# Patient Record
Sex: FEMALE | Race: WHITE | NOT HISPANIC OR LATINO | Employment: OTHER
[De-identification: names, ages, dates, MRNs, and addresses within clinical notes are randomized per-mention and may not be internally consistent; named-entity substitution may affect disease eponyms.]

---

## 2017-03-20 ENCOUNTER — RECORDS - HEALTHEAST (OUTPATIENT)
Dept: ADMINISTRATIVE | Facility: OTHER | Age: 67
End: 2017-03-20

## 2017-05-08 ENCOUNTER — RECORDS - HEALTHEAST (OUTPATIENT)
Dept: BONE DENSITY | Facility: CLINIC | Age: 67
End: 2017-05-08

## 2017-05-08 ENCOUNTER — RECORDS - HEALTHEAST (OUTPATIENT)
Dept: ADMINISTRATIVE | Facility: OTHER | Age: 67
End: 2017-05-08

## 2017-05-08 ENCOUNTER — HOSPITAL ENCOUNTER (OUTPATIENT)
Dept: MAMMOGRAPHY | Facility: CLINIC | Age: 67
Discharge: HOME | End: 2017-05-08
Attending: INTERNAL MEDICINE

## 2017-05-08 DIAGNOSIS — Z12.31 VISIT FOR SCREENING MAMMOGRAM: ICD-10-CM

## 2017-05-08 DIAGNOSIS — Z13.820 ENCOUNTER FOR SCREENING FOR OSTEOPOROSIS: ICD-10-CM

## 2017-05-08 DIAGNOSIS — Z78.0 ASYMPTOMATIC MENOPAUSAL STATE: ICD-10-CM

## 2019-03-11 ENCOUNTER — RECORDS - HEALTHEAST (OUTPATIENT)
Dept: ADMINISTRATIVE | Facility: OTHER | Age: 69
End: 2019-03-11

## 2019-05-16 ENCOUNTER — RECORDS - HEALTHEAST (OUTPATIENT)
Dept: ADMINISTRATIVE | Facility: OTHER | Age: 69
End: 2019-05-16

## 2019-05-16 ENCOUNTER — RECORDS - HEALTHEAST (OUTPATIENT)
Dept: BONE DENSITY | Facility: CLINIC | Age: 69
End: 2019-05-16

## 2019-05-16 ENCOUNTER — HOSPITAL ENCOUNTER (OUTPATIENT)
Dept: MAMMOGRAPHY | Facility: CLINIC | Age: 69
Discharge: HOME | End: 2019-05-16
Attending: INTERNAL MEDICINE

## 2019-05-16 DIAGNOSIS — Z12.31 VISIT FOR SCREENING MAMMOGRAM: ICD-10-CM

## 2019-05-16 DIAGNOSIS — M85.80 OTHER SPECIFIED DISORDERS OF BONE DENSITY AND STRUCTURE, UNSPECIFIED SITE: ICD-10-CM

## 2019-05-16 DIAGNOSIS — Z78.0 ASYMPTOMATIC MENOPAUSAL STATE: ICD-10-CM

## 2019-11-04 ENCOUNTER — OFFICE VISIT (OUTPATIENT)
Dept: FAMILY MEDICINE | Facility: CLINIC | Age: 69
End: 2019-11-04
Payer: MEDICARE

## 2019-11-04 VITALS
HEART RATE: 68 BPM | TEMPERATURE: 97.7 F | SYSTOLIC BLOOD PRESSURE: 115 MMHG | WEIGHT: 151 LBS | OXYGEN SATURATION: 98 % | DIASTOLIC BLOOD PRESSURE: 77 MMHG | RESPIRATION RATE: 12 BRPM

## 2019-11-04 DIAGNOSIS — Z71.84 ENCOUNTER FOR COUNSELING FOR TRAVEL: Primary | ICD-10-CM

## 2019-11-04 DIAGNOSIS — Z23 NEED FOR IMMUNIZATION AGAINST TYPHOID: ICD-10-CM

## 2019-11-04 DIAGNOSIS — Z23 NEED FOR HEPATITIS A VACCINATION: ICD-10-CM

## 2019-11-04 PROCEDURE — 90472 IMMUNIZATION ADMIN EACH ADD: CPT | Performed by: PHYSICIAN ASSISTANT

## 2019-11-04 PROCEDURE — 90632 HEPA VACCINE ADULT IM: CPT | Performed by: PHYSICIAN ASSISTANT

## 2019-11-04 PROCEDURE — 99401 PREV MED CNSL INDIV APPRX 15: CPT | Mod: 25 | Performed by: PHYSICIAN ASSISTANT

## 2019-11-04 PROCEDURE — 90471 IMMUNIZATION ADMIN: CPT | Performed by: PHYSICIAN ASSISTANT

## 2019-11-04 PROCEDURE — 90691 TYPHOID VACCINE IM: CPT | Performed by: PHYSICIAN ASSISTANT

## 2019-11-04 RX ORDER — FLUTICASONE PROPIONATE 50 MCG
1 SPRAY, SUSPENSION (ML) NASAL
COMMUNITY
Start: 2016-04-19

## 2019-11-04 RX ORDER — AZITHROMYCIN 500 MG/1
TABLET, FILM COATED ORAL
Qty: 9 TABLET | Refills: 0 | Status: SHIPPED | OUTPATIENT
Start: 2019-11-04 | End: 2022-07-28

## 2019-11-04 RX ORDER — ASPIRIN 81 MG/1
81 TABLET ORAL
COMMUNITY
End: 2022-11-18

## 2019-11-04 RX ORDER — MEFLOQUINE HYDROCHLORIDE 250 MG/1
TABLET ORAL
Qty: 14 TABLET | Refills: 0 | Status: SHIPPED | OUTPATIENT
Start: 2019-11-04 | End: 2022-07-28

## 2019-11-04 RX ORDER — AZELASTINE HYDROCHLORIDE 137 UG/1
SPRAY, METERED NASAL
Refills: 1 | COMMUNITY
Start: 2019-10-11

## 2019-11-04 RX ORDER — ACETAMINOPHEN 500 MG
1000 TABLET ORAL
COMMUNITY
Start: 2019-09-10

## 2019-11-04 RX ORDER — MOMETASONE FUROATE 1 MG/G
CREAM TOPICAL
Refills: 11 | COMMUNITY
Start: 2019-09-25

## 2019-11-04 NOTE — PATIENT INSTRUCTIONS
"See travel packet provided  Recommend ultrathon (mosquito repellant), pepto bismol and imodium  The food and drink choices you make while traveling can impact your likelihood of getting sick.   If you aren't sure if a food or drink is safe, the saying \" BOIL IT, COOK IT, PEEL IT, OR FORGET IT\" can help you decide whether it's okay to consume.   Also bring hand  and sun screen with you.  Safe Travels       Today November 4, 2019 you received the    Hepatitis A Vaccine - Please return on 5/4/20 or later for your 2nd and final dose.    Typhoid - injectable. This vaccine is valid for two years.   .    These appointments can be made as a NURSE ONLY visit.    **It is very important for the vaccinations to be given on the scheduled day(s), this helps ensure you receive the full effectiveness of the vaccine.**    Please call [facility] with any questions [phone]    Thank you for visiting [facility]    "

## 2019-11-04 NOTE — PROGRESS NOTES
SUBJECTIVE: Pily Mejia , a 69 year old  female, presents for counseling and information regarding upcoming travel to [place], [place], [place], [place], [place]. Special medical concerns include: none. She anticipates the following unusual exposures: none.    Itinerary:  [place], [place], [place], [place], [place].    Departure Date: 12/25/19 Return date: 2/14/20    Reason for travel (i.e. Business, pleasure): pleasure    Visiting an urban or rural area?: Urban    Accommodations (i.e. hotel, hostel, friends, family, etc): hotel    Women - First day of your last period: na    IMMUNIZATION HISTORY  Have you received any vaccinations in the past 4 weeks?  No  Have you ever fainted from having your blood drawn or from an injection?  No  Have you ever had a fever reaction to vaccination?  Yes  Have you ever had any bad reaction or side effect from any vaccination?  Yes  Have you ever had hepatitis A or B vaccine?  No  Do you live (or work closely) with anyone who has AIDS, an AIDS-like condition, any other immune disorder or who is on chemotherapy for cancer?  No  Have you received any injection of immune globulin or any blood products during the past 12 months?  No    GENERAL MEDICAL HISTORY  Do you have a medical condition that warrants maintenance medication or physician follow-up?  yes  Do you have a medical condition that is stable now, but that may recur while traveling?  yes  Has your spleen been removed?  No  Have you had an acute illness or a fever in the past 48 hours?  No  Are you pregnant, or might you become pregnant on this trip?  Any chance of pregnancy?  No  Are you breastfeeding?  No  Do you have HIV, AIDS, an AIDS-like condition, any other immune disorder, leukemia or cancer?  No  Do you have a severe combined immunodeficiency disease?  No  Have you had your thymus gland removed or history of problems with your thymus, such as myasthenia gravis, DiGeorge syndrome, or thymoma?  No    Do you have  severe thrombocytopenia (low platelet count) or a coagulation disorder?  No  Have you ever had a convulsion, seizure, epilepsy, neurologic condition or brain infection?  No  Do you have any stomach conditions?  No  Do you have a G6PD deficiency?  No  Do you have severe renal or kidney impairment?  No  Do you have a history of psychiatric problems?  No  Do you have a problem with strange dreams and/or nightmares?  No  Do you have insomnia?  No  Do you have problems with vaginitis?  No  Do you have psoriasis?  No  Are you prone to motion sickness?  yes  Have you ever had headaches, nausea, vomiting, or breathing problems from altitude exposure?  No      No past medical history on file.   Immunization History   Administered Date(s) Administered     Td (Adult), Adsorbed 1950, 04/01/2004       No current outpatient medications on file.     Allergies not on file     EXAM: deferred    Immunizations discussed include: Hepatitis A and Typhoid  Malaraia prophylaxis recommended: mefloquine  Symptomatic treatment for traveler's diarrhea: bismuth subsalicylate, loperamide/diphenoxylate and azithromycin    ASSESSMENT/PLAN:    (Z71.84) Encounter for counseling for travel  (primary encounter diagnosis)    Comment: Hepatitis A and typhoid vaccines today. Patient will return or follow-up with PCP in 6 months for Hep A booster. Prophylaxis given for Traveler's diarrhea and Malaria. All questions were answered.     Plan: mefloquine (LARIAM) 250 MG tablet, azithromycin        (ZITHROMAX) 500 MG tablet            (Z23) Need for hepatitis A vaccination  Comment:   Plan: HEPA VACCINE ADULT IM            (Z23) Need for immunization against typhoid  Comment:   Plan: TYPHOID VACCINE, IM              I have reviewed general recommendations for safe travel   including: food/water precautions, insect avoidance, safe sex   practices given high prevalence of HIV and other STDs,   roadway safety. Educational materials and links to the CDC    Traveler's health website have been provided.    Total time 20 minutes, greater than 50 percent in counseling   and coordination of care.

## 2020-09-03 ENCOUNTER — HOSPITAL ENCOUNTER (OUTPATIENT)
Dept: MAMMOGRAPHY | Facility: CLINIC | Age: 70
Discharge: HOME | End: 2020-09-03
Attending: INTERNAL MEDICINE

## 2020-09-03 DIAGNOSIS — Z12.31 VISIT FOR SCREENING MAMMOGRAM: ICD-10-CM

## 2021-05-29 ENCOUNTER — RECORDS - HEALTHEAST (OUTPATIENT)
Dept: ADMINISTRATIVE | Facility: CLINIC | Age: 71
End: 2021-05-29

## 2021-05-30 ENCOUNTER — RECORDS - HEALTHEAST (OUTPATIENT)
Dept: ADMINISTRATIVE | Facility: CLINIC | Age: 71
End: 2021-05-30

## 2021-07-21 ENCOUNTER — RECORDS - HEALTHEAST (OUTPATIENT)
Dept: ADMINISTRATIVE | Facility: CLINIC | Age: 71
End: 2021-07-21

## 2021-10-28 ENCOUNTER — TRANSCRIBE ORDERS (OUTPATIENT)
Dept: OTHER | Age: 71
End: 2021-10-28

## 2021-10-28 ENCOUNTER — TELEPHONE (OUTPATIENT)
Dept: DERMATOLOGY | Facility: CLINIC | Age: 71
End: 2021-10-28
Payer: MEDICARE

## 2021-10-28 ENCOUNTER — TRANSFERRED RECORDS (OUTPATIENT)
Dept: HEALTH INFORMATION MANAGEMENT | Facility: CLINIC | Age: 71
End: 2021-10-28
Payer: MEDICARE

## 2021-10-28 DIAGNOSIS — M35.00 HX OF SJOGREN'S DISEASE (H): Primary | ICD-10-CM

## 2021-10-28 DIAGNOSIS — G90.1 DYSAUTONOMIA (H): ICD-10-CM

## 2021-10-28 NOTE — TELEPHONE ENCOUNTER
Health Call Center    Phone Message    May a detailed message be left on voicemail: yes     Reason for Call: Appointment Intake    Referring Provider Name:   Dr Fernandez at [facility]    Diagnosis and/or Symptoms:   Incoming referral    Action Taken: Message routed to:  [facility] Derm    Travel Screening: Not Applicable    Pt was told by Dr Fernandez that she needs to see Dr Michael. Pt stated that she does not have a clear Dx and mentioned both scleroderma, rosacea and receeding skin.    Instructed Pt to provide referral fax to referring  as referral not yet received.    Please call Pt to determine if she can be seen by Dr Michael.

## 2022-02-10 ENCOUNTER — VIRTUAL VISIT (OUTPATIENT)
Dept: DERMATOLOGY | Facility: CLINIC | Age: 72
End: 2022-02-10
Attending: DERMATOLOGY
Payer: COMMERCIAL

## 2022-02-10 DIAGNOSIS — G62.9 NEUROPATHY: Primary | ICD-10-CM

## 2022-02-10 DIAGNOSIS — L71.9 ROSACEA: ICD-10-CM

## 2022-02-10 PROCEDURE — 99205 OFFICE O/P NEW HI 60 MIN: CPT | Mod: TEL | Performed by: DERMATOLOGY

## 2022-02-10 RX ORDER — LIDOCAINE 40 MG/G
CREAM TOPICAL
Qty: 45 G | Refills: 1 | Status: SHIPPED | OUTPATIENT
Start: 2022-02-10 | End: 2022-05-12

## 2022-02-10 RX ORDER — DOXYCYCLINE HYCLATE 20 MG
20 TABLET ORAL 2 TIMES DAILY
Qty: 180 TABLET | Refills: 2 | Status: SHIPPED | OUTPATIENT
Start: 2022-02-10 | End: 2022-11-18

## 2022-02-10 NOTE — LETTER
Date:February 11, 2022      Patient was self referred, no letter generated. Do not send.        [facility] Health Information

## 2022-02-10 NOTE — PROGRESS NOTES
Pily is a 71 year old who is being evaluated via a billable telephone visit.      What phone number would you like to be contacted at? [phone]  How would you like to obtain your AVS? [place]  Phone call duration:65 minutes    Radha Slaughter CMA

## 2022-02-10 NOTE — LETTER
2/10/2022       RE: Pily Mejia  [address] [address]     Dear Colleague,    Thank you for referring your patient, Pily Mejia, to the [facility] [place] [facility]. Please see a copy of my visit note below.    Pily is a 71 year old who is being evaluated via a billable telephone visit.      What phone number would you like to be contacted at? [phone]  How would you like to obtain your AVS? Shotot  Phone call duration:65 minutes    Radha Slaughter CMA      Rheumatology-Dermatology Clinic New Patient Note    Patient is new to the clinic, seen in consultation for Dr. Tavo Gonzalez    Dermatology Problem List  1. Sjogren syndrome (+ RUSLAN 1:320, speckeled, + Ro60 234, + SSA 4.6, bneg monoclonal on spep, normal complements, weak + Phospholuipid ab 18.4)  - RF neg, C3/c4 wnl, ds-DNA neg, Immunoglobulins nml, hep c neg  - Tried plaquenil but complained of nausea, hair loss, dizziness  - Salivary glands scan wnl  2. Burning dysesthesia on face  3. Anhidrosis   -  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 SHe thinks that is since childhood.  - Other autonomic reflex screen was minimally abnormal. Isolated finding of focal reduced sweat response to the proximal leg with normal cardiovagal and adrenergic reflexes, which was of uncertain significance.   - Brain MRI wnl.  4. Rosacea   5. Osteopenia  6. S/p Hysterectomy with bilateral salpingo-oophorectomy for endometriosis, age 36.  7. 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 Hx of vitamin D deficiency   9. Diminished thyroid uptake on lerft compared to rt on scan    CC:  Chief Complaint   Patient presents with     Consult     initial visit. referred by Dr. Fernandez potential rosacea or scleroderma     History of Present Illness  Pily Mejia is a 71 year old  female with the above noted PMH being seen in rheum-derm clinic for evaluation of  facial dysesthesia  - Has had worsening fatigue which is now one of the most problematic issues. Her joint pains are worse in the hands (swelling), left hip and knees. Hip is most painful when she is laying flat on her back and her legs/knee are straight, She has been carrying her grandson. Knees worse at night after walking with baby. A little stiff in morning going up and down steps. Left hip pain has not been evaluated.  No pain with walking or standing gets better with walking.  She had sweat test and she does not sweat much. She feels hot flashes at night. A little sweating under her arms at night. She wakes up feeling hot and damp in axilla, behind knees, under breasts. No drenching night sweats. + dry eyes and dry mouth.   She was using restasis for about 5 years and was switched to Xiidra due to cost, but found the restasis more helpful. .he uses occasional over-the-counter Systane. No hx of parotid swelling. She has hx of a lot of cavities. She has never had any parotid gland swelling. She is using a collagen supplement that she takes daily whichshe thinks helps. She thinks it helps her skin feel less tight and more moisture.    No hx of raynauds. Fluids go down ok. She is having issues swallowing. She makes sure she drinks enough water otherwise things will get stuck, not only in back of throat but also down esophagus. SHe has to be mindful about sheing. Bread is very hard. Better with vegetables.  Feels like she is getting weaker.She stopped for a while exercising due to covid,.     Pushing and scratching hurts her skin. Nothing makes it better or worse.    She does think the elidel cream helps.    Burning started march and august at the same time as the shot.    She had issues with azelastine and fluoride tooth past. It feels like there is burning and irritation in the throat and problems swallowing after use. 30 min fluiride rinse doesn't do it.     Complete Review of Systems  Constitutional: no fevers,  chills, night sweats or unintentional weight change, chronically fatigued. Night sweats  which are primarily under her breasts, armpits, and the back of her knee  Eyes: no vision change, diplopia or red eyes   Ears, Nose, Mouth, Throat: no tinnitus or hearing change, no epistaxis or nasal discharge, no oral lesions, throat clear   Cardiovascular: no chest pain, palpitations, or pain with walking, no orthopnea or PND   Respiratory: no dyspnea, cough, shortness of breath or wheezing   GI: 1-2 bowle movemenst a day. Takes probiootics, Feels she has to work a little bit to go.  :No issues with urination. Vaginal dryness  That is helped with collagen.  Musculoskeletal: no joint or muscle pain or swelling   Integumentary: no concerning lesions or moles   Neuro: no dysesthesias other than the face.    Past Medical History:    Thyroid nodule or a cyst adjacent to the thyroid being followed.  Pulmonary nodules.  BPPV.  Seasonal/environmental allergies, receives allergy shots.  GERD.  Diverticulitis x2, last in 2016.  Vitamin D deficiency  Cataracts.  Fracture of her left upper arm skiing when she was in her 40s.  Osteopenia.  G3, P2, SA1. No complications with her 2 pregnancies. Last delivery in 1979    Medications:  Current Outpatient Medications   Medication     acetaminophen (TYLENOL) 500 MG tablet     aspirin 81 MG EC tablet     Azelastine HCl 137 MCG/SPRAY SOLN     azithromycin (ZITHROMAX) 500 MG tablet     Calcium Carbonate Antacid (TUMS CHEWY DELIGHTS) 1177 MG CHEW     diclofenac (VOLTAREN) 1 % topical gel     fluticasone (FLONASE) 50 MCG/ACT nasal spray     mefloquine (LARIAM) 250 MG tablet     mometasone (ELOCON) 0.1 % external cream     probiotic CAPS     No current facility-administered medications for this visit.     Allergies:   Allergies   Allergen Reactions     Codeine      Fish GI Disturbance     Hydroxychloroquine Other (See Comments) and GI Disturbance     Lac Bovis GI Disturbance     No Clinical  Screening - See Comments Other (See Comments)     Triminycine per patient     Penicillins      Soybean Oil GI Disturbance     Social History  Social History     Socioeconomic History     Marital status:      Spouse name: Not on file     Number of children: Not on file     Years of education: Not on file     Highest education level: Not on file   Occupational History     Not on file   Tobacco Use     Smoking status: Never Smoker     Smokeless tobacco: Never Used   Substance and Sexual Activity     Alcohol use: Not on file     Drug use: Not on file     Sexual activity: Not on file   Other Topics Concern     Not on file   Social History Narrative     Not on file     Social Determinants of Health     Financial Resource Strain: Not on file   Food Insecurity: Not on file   Transportation Needs: Not on file   Physical Activity: Not on file   Stress: Not on file   Social Connections: Not on file   Intimate Partner Violence: Not on file   Housing Stability: Not on file     Family History:  Family History   Problem Relation Age of Onset     Breast Cancer No family hx of      Physical Exam  GEN: NAD, alert and appropriately responsive  SKIN:   - Small indentation on forehead  - Loss of lateral third eyebrow    65 min spent on phone getting history and counseling    Examination of the head, neck, chest, back, abdomen, upper and lower extremities and buttock were done     Labs/Imaging: Reviewed  TSH 1/13/22 wnl    Assessment & Plan:  1) Facial dysesthesia  - Suspect small fiber peripheral neuropathy. ? 2/2 sjogren syndrome  - Less likely neurogenic rosacea or other atypical facial pain  - Discussed biopsies but I dont think this would  at this time  - Lidocaine cream as needed of burning   - Will discuss other causes other than sjogren syndrome with neurology. I donty see b12 or lyme evaluated. Recent SPEP wnl,. I do understand that sjogren is most likely given the clinical picture.  - She wants to avoid  oral meds for now    2) Sjogren sydnrome  - Joint pains do not sound inflammatory and she is appropriately managing her ocular and vaginal dryness. Would like to switch back to restasis.  She will discuss this with ophthalmology. No high risk findings for lymphoma.     3) Rosacea  - Continue elidel BID and reduce doxycyline down to 20mG BID    Thank you for referring Pily Mejia to the rheumatology-dermatology clinic        Again, thank you for allowing me to participate in the care of your patient.      Sincerely,    Aureliano Michael MD

## 2022-02-10 NOTE — PATIENT INSTRUCTIONS
- Lidocaine cream as needed of burning   - Will discuss other causes other than sjogren syndrome with neurology  - Decrease doxycyline to 20mg twice a day

## 2022-02-10 NOTE — PROGRESS NOTES
Rheumatology-Dermatology Clinic New Patient Note    Patient is new to the clinic, seen in consultation for Dr. Tavo Gonzalez    Dermatology Problem List  1. Sjogren syndrome (+ RUSLAN 1:320, speckeled, + Ro60 234, + SSA 4.6, bneg monoclonal on spep, normal complements, weak + Phospholuipid ab 18.4)  - RF neg, C3/c4 wnl, ds-DNA neg, Immunoglobulins nml, hep c neg  - Tried plaquenil but complained of nausea, hair loss, dizziness  - Salivary glands scan wnl  2. Burning dysesthesia on face  3. Anhidrosis   -  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 SHe thinks that is since childhood.  - Other autonomic reflex screen was minimally abnormal. Isolated finding of focal reduced sweat response to the proximal leg with normal cardiovagal and adrenergic reflexes, which was of uncertain significance.   - Brain MRI wnl.  4. Rosacea   5. Osteopenia  6. S/p Hysterectomy with bilateral salpingo-oophorectomy for endometriosis, age 36.  7. 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 Hx of vitamin D deficiency   9. Diminished thyroid uptake on lerft compared to rt on scan    CC:  Chief Complaint   Patient presents with     Consult     initial visit. referred by Dr. Fernandez potential rosacea or scleroderma     History of Present Illness  Pily Mejia is a 71 year old  female with the above noted PMH being seen in rheum-derm clinic for evaluation of facial dysesthesia  - Has had worsening fatigue which is now one of the most problematic issues. Her joint pains are worse in the hands (swelling), left hip and knees. Hip is most painful when she is laying flat on her back and her legs/knee are straight, She has been carrying her grandson. Knees worse at night after walking with baby. A little stiff in morning going up and down steps. Left hip pain has not been evaluated.  No pain with walking or standing gets better with walking.  She had sweat test and she does not sweat much. She feels hot flashes at night. A  little sweating under her arms at night. She wakes up feeling hot and damp in axilla, behind knees, under breasts. No drenching night sweats. + dry eyes and dry mouth.   She was using restasis for about 5 years and was switched to Xiidra due to cost, but found the restasis more helpful. .he uses occasional over-the-counter Systane. No hx of parotid swelling. She has hx of a lot of cavities. She has never had any parotid gland swelling. She is using a collagen supplement that she takes daily whichshe thinks helps. She thinks it helps her skin feel less tight and more moisture.    No hx of raynauds. Fluids go down ok. She is having issues swallowing. She makes sure she drinks enough water otherwise things will get stuck, not only in back of throat but also down esophagus. SHe has to be mindful about sheing. Bread is very hard. Better with vegetables.  Feels like she is getting weaker.She stopped for a while exercising due to covid,.     Pushing and scratching hurts her skin. Nothing makes it better or worse.    She does think the elidel cream helps.    Burning started march and august at the same time as the shot.    She had issues with azelastine and fluoride tooth past. It feels like there is burning and irritation in the throat and problems swallowing after use. 30 min fluiride rinse doesn't do it.     Complete Review of Systems  Constitutional: no fevers, chills, night sweats or unintentional weight change, chronically fatigued. Night sweats  which are primarily under her breasts, armpits, and the back of her knee  Eyes: no vision change, diplopia or red eyes   Ears, Nose, Mouth, Throat: no tinnitus or hearing change, no epistaxis or nasal discharge, no oral lesions, throat clear   Cardiovascular: no chest pain, palpitations, or pain with walking, no orthopnea or PND   Respiratory: no dyspnea, cough, shortness of breath or wheezing   GI: 1-2 bowle movemenst a day. Takes probiootics, Feels she has to work a little  bit to go.  :No issues with urination. Vaginal dryness  That is helped with collagen.  Musculoskeletal: no joint or muscle pain or swelling   Integumentary: no concerning lesions or moles   Neuro: no dysesthesias other than the face.    Past Medical History:    Thyroid nodule or a cyst adjacent to the thyroid being followed.  Pulmonary nodules.  BPPV.  Seasonal/environmental allergies, receives allergy shots.  GERD.  Diverticulitis x2, last in 2016.  Vitamin D deficiency  Cataracts.  Fracture of her left upper arm skiing when she was in her 40s.  Osteopenia.  G3, P2, SA1. No complications with her 2 pregnancies. Last delivery in 1979    Medications:  Current Outpatient Medications   Medication     acetaminophen (TYLENOL) 500 MG tablet     aspirin 81 MG EC tablet     Azelastine HCl 137 MCG/SPRAY SOLN     azithromycin (ZITHROMAX) 500 MG tablet     Calcium Carbonate Antacid (TUMS CHEWY DELIGHTS) 1177 MG CHEW     diclofenac (VOLTAREN) 1 % topical gel     fluticasone (FLONASE) 50 MCG/ACT nasal spray     mefloquine (LARIAM) 250 MG tablet     mometasone (ELOCON) 0.1 % external cream     probiotic CAPS     No current facility-administered medications for this visit.     Allergies:   Allergies   Allergen Reactions     Codeine      Fish GI Disturbance     Hydroxychloroquine Other (See Comments) and GI Disturbance     Lac Bovis GI Disturbance     No Clinical Screening - See Comments Other (See Comments)     Triminycine per patient     Penicillins      Soybean Oil GI Disturbance     Social History  Social History     Socioeconomic History     Marital status:      Spouse name: Not on file     Number of children: Not on file     Years of education: Not on file     Highest education level: Not on file   Occupational History     Not on file   Tobacco Use     Smoking status: Never Smoker     Smokeless tobacco: Never Used   Substance and Sexual Activity     Alcohol use: Not on file     Drug use: Not on file     Sexual  activity: Not on file   Other Topics Concern     Not on file   Social History Narrative     Not on file     Social Determinants of Health     Financial Resource Strain: Not on file   Food Insecurity: Not on file   Transportation Needs: Not on file   Physical Activity: Not on file   Stress: Not on file   Social Connections: Not on file   Intimate Partner Violence: Not on file   Housing Stability: Not on file     Family History:  Family History   Problem Relation Age of Onset     Breast Cancer No family hx of      Physical Exam  GEN: NAD, alert and appropriately responsive  SKIN:   - Small indentation on forehead  - Loss of lateral third eyebrow    65 min spent on phone getting history and counseling    Examination of the head, neck, chest, back, abdomen, upper and lower extremities and buttock were done     Labs/Imaging: Reviewed  TSH 1/13/22 wnl    Assessment & Plan:  1) Facial dysesthesia  - Suspect small fiber peripheral neuropathy. ? 2/2 sjogren syndrome  - Less likely neurogenic rosacea or other atypical facial pain  - Discussed biopsies but I dont think this would  at this time  - Lidocaine cream as needed of burning   - Will discuss other causes other than sjogren syndrome with neurology. I donty see b12 or lyme evaluated. Recent SPEP wnl,. I do understand that sjogren is most likely given the clinical picture.  - She wants to avoid oral meds for now    2) Sjogren sydnrome  - Joint pains do not sound inflammatory and she is appropriately managing her ocular and vaginal dryness. Would like to switch back to restasis.  She will discuss this with ophthalmology. No high risk findings for lymphoma.     3) Rosacea  - Continue elidel BID and reduce doxycyline down to 20mG BID    Thank you for referring Pily Mejia to the rheumatology-dermatology clinic

## 2022-02-13 ENCOUNTER — HEALTH MAINTENANCE LETTER (OUTPATIENT)
Age: 72
End: 2022-02-13

## 2022-03-17 ENCOUNTER — TELEPHONE (OUTPATIENT)
Dept: DERMATOLOGY | Facility: CLINIC | Age: 72
End: 2022-03-17
Payer: COMMERCIAL

## 2022-03-17 NOTE — TELEPHONE ENCOUNTER
Called to follow-up. Facial dysesthesia has been much better. Had one epsidode yesterday which resolved quickly. Never tried the lidocaine. Talked to her [facility] neurologist who recommended against biopsies and recommended pulsed steroids if getting worse. Will follow-up With Pily in a few months.

## 2022-05-05 ENCOUNTER — LAB (OUTPATIENT)
Dept: LAB | Facility: CLINIC | Age: 72
End: 2022-05-05
Payer: COMMERCIAL

## 2022-05-05 ENCOUNTER — OFFICE VISIT (OUTPATIENT)
Dept: DERMATOLOGY | Facility: CLINIC | Age: 72
End: 2022-05-05
Attending: DERMATOLOGY
Payer: COMMERCIAL

## 2022-05-05 VITALS
HEART RATE: 81 BPM | OXYGEN SATURATION: 97 % | SYSTOLIC BLOOD PRESSURE: 121 MMHG | WEIGHT: 154 LBS | DIASTOLIC BLOOD PRESSURE: 74 MMHG

## 2022-05-05 DIAGNOSIS — R94.31 ABNORMAL EKG: Primary | ICD-10-CM

## 2022-05-05 DIAGNOSIS — G62.9 NEUROPATHY: ICD-10-CM

## 2022-05-05 LAB — VIT B12 SERPL-MCNC: 332 PG/ML (ref 193–986)

## 2022-05-05 PROCEDURE — 82607 VITAMIN B-12: CPT | Performed by: PATHOLOGY

## 2022-05-05 PROCEDURE — 36415 COLL VENOUS BLD VENIPUNCTURE: CPT | Performed by: PATHOLOGY

## 2022-05-05 PROCEDURE — 99214 OFFICE O/P EST MOD 30 MIN: CPT | Performed by: DERMATOLOGY

## 2022-05-05 PROCEDURE — 86618 LYME DISEASE ANTIBODY: CPT | Performed by: DERMATOLOGY

## 2022-05-05 PROCEDURE — G0463 HOSPITAL OUTPT CLINIC VISIT: HCPCS

## 2022-05-05 RX ORDER — AZATHIOPRINE 50 MG/1
TABLET ORAL
COMMUNITY
Start: 2022-03-18 | End: 2022-07-28

## 2022-05-05 ASSESSMENT — PAIN SCALES - GENERAL: PAINLEVEL: NO PAIN (0)

## 2022-05-05 NOTE — PROGRESS NOTES
Provider Disclosure:   I agree with above History, Review of Systems, Physical exam and Plan. I have reviewed the content of the documentation and have edited it as needed. I have personally performed the services documented here and the documentation accurately represents those services and the decisions I have made.   [facility] Dermatology Note  Encounter Date: May 5, 2022  Office Visit     Dermatology Problem List:  1.Sjogren syndrome (+ RUSLAN 1:320, speckeled, + Ro60 234, + SSA 4.6, bneg monoclonal on spep, normal complements, weak + Phospholuipid ab 18.4)  - RF neg, C3/c4 wnl, ds-DNA neg, Immunoglobulins nml, hep c neg  -Started azathioprine 25mg.  - Tried plaquenil but complained of nausea, hair loss, dizziness  - Salivary glands scan wnl  2. Burning dysesthesia on face  3. Anhidrosis   -  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 SHe thinks that is since childhood.  - Other autonomic reflex screen was minimally abnormal. Isolated finding of focal reduced sweat response to the proximal leg with normal cardiovagal and adrenergic reflexes, which was of uncertain significance.   - Brain MRI wnl.  4. Rosacea   5. Osteopenia  6. S/p Hysterectomy with bilateral salpingo-oophorectomy for endometriosis, age 36.  7. 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 Hx of vitamin D deficiency   9. Diminished thyroid uptake on lerft compared to rt on scan    ____________________________________________    Assessment & Plan:     # ***.  {kkplans:605551}   - ***     # ***.   {kkplans:350498}   - ***     Procedures Performed:   {kkprocedurenotes:236986}  {kkprocedurenotes:312294}    Follow-up: {kkfollowup:385063}    {kkstaffinvolved:654933}    ***  ____________________________________________    CC: RECHECK (Follow up scleroderma, rosacea and neuropathy. )    HPI:  Ms. Pily S Mejia is a(n) 72 year old female who presents today {kknew/return:101844} for ***    Patient is otherwise  feeling well, without additional skin concerns.    Labs Reviewed:  ***N/A    Physical Exam:  Vitals: /74   Pulse 81   Wt 69.9 kg (154 lb)   SpO2 97%   SKIN: {kkSkinExam:153704}  - ***  - {Skin Exam Derm:644454}.   - {Skin Exam Derm:555422}.   - {Skin Exam Derm:771370}.   - No other lesions of concern on areas examined.     Medications:  Current Outpatient Medications   Medication     acetaminophen (TYLENOL) 500 MG tablet     aspirin 81 MG EC tablet     azaTHIOprine (IMURAN) 50 MG tablet     Azelastine HCl 137 MCG/SPRAY SOLN     Calcium Carbonate Antacid 1177 MG CHEW     diclofenac (VOLTAREN) 1 % topical gel     doxycycline hyclate (PERIOSTAT) 20 MG tablet     fluticasone (FLONASE) 50 MCG/ACT nasal spray     lidocaine (LMX4) 4 % external cream     mometasone (ELOCON) 0.1 % external cream     probiotic CAPS     azithromycin (ZITHROMAX) 500 MG tablet     mefloquine (LARIAM) 250 MG tablet     No current facility-administered medications for this visit.      Past Medical History:   Patient Active Problem List   Diagnosis     Osteopenia     Postsurgical Acquired Absence Of Genitalia     No past medical history on file.    CC Referred Self, MD  No address on file on close of this encounter.

## 2022-05-05 NOTE — LETTER
Date:May 12, 2022      Patient was self referred, no letter generated. Do not send.        [facility] Health Information

## 2022-05-05 NOTE — PROGRESS NOTES
[facility] Dermatology Note  Encounter Date: May 5, 2022  Office Visit     Dermatology Problem List:  1.Sjogren syndrome (+ RUSLAN 1:320, speckeled, + Ro60 234, + SSA 4.6, bneg monoclonal on spep, normal complements, weak + Phospholuipid ab 18.4)  - RF neg, C3/c4 wnl, ds-DNA neg, Immunoglobulins nml, hep c neg  -Started azathioprine 25mg.  - Tried plaquenil but complained of nausea, hair loss, dizziness  - Salivary glands scan wnl  2. Burning dysesthesia on face  3. Anhidrosis   -  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 SHe thinks that is since childhood.  - Other autonomic reflex screen was minimally abnormal. Isolated finding of focal reduced sweat response to the proximal leg with normal cardiovagal and adrenergic reflexes, which was of uncertain significance.   - Brain MRI wnl.  4. Rosacea   5. Osteopenia  6. S/p Hysterectomy with bilateral salpingo-oophorectomy for endometriosis, age 36.  7. 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 Hx of vitamin D deficiency   9. Diminished thyroid uptake on lerft compared to rt on scan    ____________________________________________    Assessment & Plan:   # Sjogren's syndrome, facial dysesthesia and neuropathy:  Plan:  - Facial dysestheisa semed to be getting better but then flared after covid booster  - [place] thinks this is consistent with neuropathy and did not think biopsies would . They recommend prednisone burst if symptoms get worse.  - With hx of first-degree heart block will recheck EKG and get echo. Also some neuropathies are associated with hypertrophic cardiomyopathies 2/2 amyloid.  -EKG was wnl  -  Echocardiogram ordered and pending  - Lyme and B12 tests today   - Continue tylenol and use lidocaine (refilled) as needed (has not tried the lidocaine yet,m but will try)      # Linear Morphea on forehead (clinically):  4 cm today:  - Recently started azathioprine. If worsening would switch to  methotrexate.   - She had side effects due to hydroxchloroquine  -  If getting worse before follow-up, will let me know ASAP    Follow-up: 12 week(s) , or earlier for new or changing lesions    Aureliano Michael MD, FAAD, FACP     Departments of Internal Medicine and Dermatology  [facility]  [phone]        ____________________________________________    CC: RECHECK (Follow up scleroderma, rosacea and neuropathy. )    HPI:  Ms. Pily Mejia is a(n) 72 year old female who presents today for follow-up  for neuropathy, facial dysesthesia and scleroderma.   She says she has a tingling,burning sensation around her cheeks and perioral area.There are no lasions or redness, but she has the sensation underneath.She remembers this started last year in March.She thinks it may be related to her first Covid vaccine dose in January 2021.She had a few flares last year in August and December, last one being last Monday after her Covid booster shot.  She feels stiff indented skin on  her forehead.She feels it has grown in size and extended upwards.  -She feels hot flashes at night. A little sweating under her arms at night. She wakes up feeling hot and damp in axilla, behind knees, under breasts. No drenching night sweats. + dry eyes and dry mouth  - For mild rosacea, the elidel cream helps.  -Mild fatigue and small joint pain.    She was using restasis for about 5 years and was switched to Xiidra due to cost, but found the restasis more helpful. .he uses occasional over-the-counter Systane. No hx of parotid swelling. She has hx of a lot of cavities. She has never had any parotid gland swelling. She is using a collagen supplement that she takes daily whichshe thinks helps. She thinks it helps her skin feel less tight and more moisture.     No hx of raynauds. Fluids go down ok. She is having issues swallowing. She makes sure she drinks enough water otherwise things will get stuck, not only in back  of throat but also down esophagus. SHe has to be mindful about sheing. Bread is very hard. Better with vegetables.  Feels like she is getting weaker.She stopped for a while exercising due to covid.      Labs Reviewed:  EKG: Normal.    Physical Exam:  Vitals: /74   Pulse 81   Wt 69.9 kg (154 lb)   SpO2 97%  EKG:NORMAL.  SKIN: Focused examination of face was performed.  -A 4cm indented plaque on her forehead going up to her mid forehead.The skin around was slightly sclerotic.  -Facial nerve examination was normal,   No visible rosacea or lesions on face.    - No other lesions of concern on areas examined.     Medications:  Current Outpatient Medications   Medication     acetaminophen (TYLENOL) 500 MG tablet     aspirin 81 MG EC tablet     azaTHIOprine (IMURAN) 50 MG tablet     Azelastine HCl 137 MCG/SPRAY SOLN     Calcium Carbonate Antacid 1177 MG CHEW     diclofenac (VOLTAREN) 1 % topical gel     doxycycline hyclate (PERIOSTAT) 20 MG tablet     fluticasone (FLONASE) 50 MCG/ACT nasal spray     lidocaine (LMX4) 4 % external cream     mometasone (ELOCON) 0.1 % external cream     probiotic CAPS     azithromycin (ZITHROMAX) 500 MG tablet     mefloquine (LARIAM) 250 MG tablet     No current facility-administered medications for this visit.      Past Medical History:   Patient Active Problem List   Diagnosis     Osteopenia     Postsurgical Acquired Absence Of Genitalia     No past medical history on file.    CC Referred Self, MD  No address on file on close of this encounter.

## 2022-05-05 NOTE — LETTER
5/5/2022       RE: Pily Mejia  [address] [place] [address]     Dear Colleague,    Thank you for referring your patient, Pily Mejia, to the [facility] RHEUMATOLOGY [facility]INIC [place] at [facility]. Please see a copy of my visit note below.    [facility] Dermatology Note  Encounter Date: May 5, 2022  Office Visit     Dermatology Problem List:  1.Sjogren syndrome (+ RUSLAN 1:320, speckeled, + Ro60 234, + SSA 4.6, bneg monoclonal on spep, normal complements, weak + Phospholuipid ab 18.4)  - RF neg, C3/c4 wnl, ds-DNA neg, Immunoglobulins nml, hep c neg  -Started azathioprine 25mg.  - Tried plaquenil but complained of nausea, hair loss, dizziness  - Salivary glands scan wnl  2. Burning dysesthesia on face  3. Anhidrosis   -  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 SHe thinks that is since childhood.  - Other autonomic reflex screen was minimally abnormal. Isolated finding of focal reduced sweat response to the proximal leg with normal cardiovagal and adrenergic reflexes, which was of uncertain significance.   - Brain MRI wnl.  4. Rosacea   5. Osteopenia  6. S/p Hysterectomy with bilateral salpingo-oophorectomy for endometriosis, age 36.  7. 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 Hx of vitamin D deficiency   9. Diminished thyroid uptake on lerft compared to rt on scan    ____________________________________________    Assessment & Plan:   # Sjogren's syndrome, facial dysesthesia and neuropathy:  Plan:  - Facial dysestheisa semed to be getting better but then flared after covid booster  - [place] thinks this is consistent with neuropathy and did not think biopsies would . They recommend prednisone burst if symptoms get worse.  - With hx of first-degree heart block will recheck EKG and get echo. Also some neuropathies are associated with hypertrophic cardiomyopathies 2/2 amyloid.  -EKG  was wnl  -  Echocardiogram ordered and pending  - Lyme and B12 tests today   - Continue tylenol and use lidocaine (refilled) as needed (has not tried the lidocaine yet,m but will try)      # Linear Morphea on forehead (clinically):  4 cm today:  - Recently started azathioprine. If worsening would switch to methotrexate.   - She had side effects due to hydroxchloroquine  -  If getting worse before follow-up, will let me know ASAP    Follow-up: 12 week(s) , or earlier for new or changing lesions    Aureliano Michael MD, FAAD, FACP     Departments of Internal Medicine and Dermatology  [facility]  [phone]        ____________________________________________    CC: RECHECK (Follow up scleroderma, rosacea and neuropathy. )    HPI:  Ms. Pily Mejia is a(n) 72 year old female who presents today for follow-up  for neuropathy, facial dysesthesia and scleroderma.   She says she has a tingling,burning sensation around her cheeks and perioral area.There are no lasions or redness, but she has the sensation underneath.She remembers this started last year in March.She thinks it may be related to her first Covid vaccine dose in January 2021.She had a few flares last year in August and December, last one being last Monday after her Covid booster shot.  She feels stiff indented skin on  her forehead.She feels it has grown in size and extended upwards.  -She feels hot flashes at night. A little sweating under her arms at night. She wakes up feeling hot and damp in axilla, behind knees, under breasts. No drenching night sweats. + dry eyes and dry mouth  - For mild rosacea, the elidel cream helps.  -Mild fatigue and small joint pain.    She was using restasis for about 5 years and was switched to Xiidra due to cost, but found the restasis more helpful. .he uses occasional over-the-counter Systane. No hx of parotid swelling. She has hx of a lot of cavities. She has never had any parotid gland swelling.  She is using a collagen supplement that she takes daily whichshe thinks helps. She thinks it helps her skin feel less tight and more moisture.     No hx of raynauds. Fluids go down ok. She is having issues swallowing. She makes sure she drinks enough water otherwise things will get stuck, not only in back of throat but also down esophagus. SHe has to be mindful about sheing. Bread is very hard. Better with vegetables.  Feels like she is getting weaker.She stopped for a while exercising due to covid.      Labs Reviewed:  EKG: Normal.    Physical Exam:  Vitals: /74   Pulse 81   Wt 69.9 kg (154 lb)   SpO2 97%  EKG:NORMAL.  SKIN: Focused examination of face was performed.  -A 4cm indented plaque on her forehead going up to her mid forehead.The skin around was slightly sclerotic.  -Facial nerve examination was normal,   No visible rosacea or lesions on face.    - No other lesions of concern on areas examined.     Medications:  Current Outpatient Medications   Medication     acetaminophen (TYLENOL) 500 MG tablet     aspirin 81 MG EC tablet     azaTHIOprine (IMURAN) 50 MG tablet     Azelastine HCl 137 MCG/SPRAY SOLN     Calcium Carbonate Antacid 1177 MG CHEW     diclofenac (VOLTAREN) 1 % topical gel     doxycycline hyclate (PERIOSTAT) 20 MG tablet     fluticasone (FLONASE) 50 MCG/ACT nasal spray     lidocaine (LMX4) 4 % external cream     mometasone (ELOCON) 0.1 % external cream     probiotic CAPS     azithromycin (ZITHROMAX) 500 MG tablet     mefloquine (LARIAM) 250 MG tablet     No current facility-administered medications for this visit.      Past Medical History:   Patient Active Problem List   Diagnosis     Osteopenia     Postsurgical Acquired Absence Of Genitalia     No past medical history on file.    CC Referred Self, MD  No address on file on close of this encounter.      Again, thank you for allowing me to participate in the care of your patient.      Sincerely,    Aureliano Michael MD

## 2022-05-05 NOTE — NURSING NOTE
Chief Complaint   Patient presents with     RECHECK     Follow up scleroderma, rosacea and neuropathy.      Blood pressure 121/74, pulse 81, weight 69.9 kg (154 lb), SpO2 97 %.    Radha Slaughter CMA

## 2022-05-05 NOTE — PATIENT INSTRUCTIONS
Clinical diagnosis for forehead area: Morphea    Plan today   - EKG and Echocardiogram ordered   - Lyme and B12 tests today   - Continue tylenol and use lidocaine as needed  -  Monitor area on forehead. If getting worse let me know ASAP

## 2022-05-06 LAB — B BURGDOR IGG+IGM SER QL: 0.1

## 2022-05-09 ENCOUNTER — MYC MEDICAL ADVICE (OUTPATIENT)
Dept: DERMATOLOGY | Facility: CLINIC | Age: 72
End: 2022-05-09
Payer: COMMERCIAL

## 2022-05-12 RX ORDER — LIDOCAINE 40 MG/G
CREAM TOPICAL
Qty: 45 G | Refills: 1 | Status: SHIPPED | OUTPATIENT
Start: 2022-05-12

## 2022-05-26 ENCOUNTER — TELEPHONE (OUTPATIENT)
Dept: DERMATOLOGY | Facility: CLINIC | Age: 72
End: 2022-05-26
Payer: COMMERCIAL

## 2022-05-26 DIAGNOSIS — G62.9 NEUROPATHY: Primary | ICD-10-CM

## 2022-05-26 RX ORDER — GABAPENTIN 100 MG/1
CAPSULE ORAL
Qty: 90 CAPSULE | Refills: 3 | Status: SHIPPED | OUTPATIENT
Start: 2022-05-26 | End: 2023-05-22

## 2022-05-26 NOTE — TELEPHONE ENCOUNTER
Sometimes feels like something is in her eyes  Whole face feels very tight and burning, low grade has been getting better. Tight burning sensation mostly in chin forehead and cheeks  She does not feel like she gets red with it  No worsening with sun exposure   Redness in face does not change.  She notes neuropathy is already getting better.   Feels better overall with hand swelling and joints on azathioprine. Will continue this for now. She is not sure iof morphea is getting worse. Will start gabapenetin 100mg at night and increase slowly to 300mg at night ,. If she feels morphea is getting worse, wll switch to methotrexate.

## 2022-07-11 ENCOUNTER — HOSPITAL ENCOUNTER (OUTPATIENT)
Dept: CARDIOLOGY | Facility: CLINIC | Age: 72
Discharge: HOME | End: 2022-07-11
Attending: DERMATOLOGY | Admitting: DERMATOLOGY
Payer: COMMERCIAL

## 2022-07-11 DIAGNOSIS — R94.31 ABNORMAL EKG: ICD-10-CM

## 2022-07-11 LAB — LVEF ECHO: NORMAL

## 2022-07-11 PROCEDURE — 93306 TTE W/DOPPLER COMPLETE: CPT | Mod: 26 | Performed by: INTERNAL MEDICINE

## 2022-07-11 PROCEDURE — 93306 TTE W/DOPPLER COMPLETE: CPT

## 2022-07-28 ENCOUNTER — OFFICE VISIT (OUTPATIENT)
Dept: DERMATOLOGY | Facility: CLINIC | Age: 72
End: 2022-07-28
Attending: DERMATOLOGY
Payer: COMMERCIAL

## 2022-07-28 VITALS
OXYGEN SATURATION: 97 % | TEMPERATURE: 97.5 F | SYSTOLIC BLOOD PRESSURE: 113 MMHG | RESPIRATION RATE: 18 BRPM | DIASTOLIC BLOOD PRESSURE: 72 MMHG | WEIGHT: 154 LBS | HEART RATE: 65 BPM

## 2022-07-28 DIAGNOSIS — L94.0 MORPHEA: ICD-10-CM

## 2022-07-28 DIAGNOSIS — Z79.899 ENCOUNTER FOR LONG-TERM (CURRENT) USE OF HIGH-RISK MEDICATION: Primary | ICD-10-CM

## 2022-07-28 PROCEDURE — G0463 HOSPITAL OUTPT CLINIC VISIT: HCPCS

## 2022-07-28 PROCEDURE — 99214 OFFICE O/P EST MOD 30 MIN: CPT | Performed by: DERMATOLOGY

## 2022-07-28 RX ORDER — METHOTREXATE 2.5 MG/1
TABLET ORAL
Qty: 24 TABLET | Refills: 4 | Status: SHIPPED | OUTPATIENT
Start: 2022-07-28 | End: 2022-11-18

## 2022-07-28 ASSESSMENT — PAIN SCALES - GENERAL: PAINLEVEL: NO PAIN (0)

## 2022-07-28 NOTE — PROGRESS NOTES
[facility] Dermatology Note  Encounter Date: May 5, 2022  Office Visit      Dermatology Problem List:  1.Sjogren syndrome (+ RUSLAN 1:320, speckeled, + Ro60 234, + SSA 4.6, bneg monoclonal on spep, normal complements, weak + Phospholuipid ab 18.4)  - RF neg, C3/c4 wnl, ds-DNA neg, Immunoglobulins nml, hep c ne  - Tried plaquenil but complained of nausea, hair loss, dizziness  - Salivary glands scan wnl  2. Burning dysesthesia on face  3. Anhidrosis   -  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 SHe thinks that is since childhood.  - Other autonomic reflex screen was minimally abnormal. Isolated finding of focal reduced sweat response to the proximal leg with normal cardiovagal and adrenergic reflexes, which was of uncertain significance.   - Brain MRI wnl.  4. Rosacea   5. Osteopenia  6. S/p Hysterectomy with bilateral salpingo-oophorectomy for endometriosis, age 36.  7. 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 Hx of vitamin D deficiency   9. Diminished thyroid uptake on lerft compared to rt on scan      CC: RECHECK (Follow up scleroderma, rosacea and neuropathy. )     HPI:  Ms. Pily Mejia is a(n) 72 year old female who presents today for follow-up  for neuropathy, facial dysesthesia and scleroderma.     Facial dysesthesia resolved currently on the gabapentin 100mg at night. No side effect  Previously had hot flashes at night. Now resolved.   . + dry eyes and dry mouth  - Uses pimecrolimus for mild rosacea  -Mild fatigue and small joint pain.  -  No much stuiffness in am. Hands feel a little tight and achy. Difficulty opening wine bottle seems weaker.    She gets pumps on her chest and legs, < arms when exposed to sun. Soemtimes they itch     She uses Neutrogena 30spgf.    She was using restasis for about 5 years and was switched to Xiidra due to cost, but found the restasis more helpful. .he uses occasional over-the-counter Systane. No hx of parotid swelling.  She has hx of a lot of cavities. She has never had any parotid gland swelling. She is using a collagen supplement that she takes daily whichshe thinks helps. She thinks it helps her skin feel less tight and more moisture.     No hx of raynauds. Fluids go down ok. She is having issues swallowing. She makes sure she drinks enough water otherwise things will get stuck, not only in back of throat but also down esophagus. SHe has to be mindful about sheing. Bread is very hard. Better with vegetables.  Feels like she is getting weaker.She stopped for a while exercising due to covid.     Physical Exam:  /72   Pulse 65   Temp 97.5  F (36.4  C)   Resp 18   Wt 69.9 kg (154 lb)   SpO2 97% .  SKIN: Focused examination of face was performed.  -A 4.2cm indented plaque on her forehead going up to her mid forehead down to rt medial eyebrown area extending a little into the orbital rim.The skin around was slightly sclerotic.  -Facial nerve examination was normal,   No visible rosacea or lesions on face.  =- Scattered 1-2mm papules few on lower gs  - No other lesions of concern on areas examined.      Medications:  Current Outpatient Medications   Medication     acetaminophen (TYLENOL) 500 MG tablet     aspirin 81 MG EC tablet     Azelastine HCl 137 MCG/SPRAY SOLN     Calcium Carbonate Antacid 1177 MG CHEW     diclofenac (VOLTAREN) 1 % topical gel     doxycycline hyclate (PERIOSTAT) 20 MG tablet     fluticasone (FLONASE) 50 MCG/ACT nasal spray     gabapentin (NEURONTIN) 100 MG capsule     lidocaine (LMX4) 4 % external cream     mometasone (ELOCON) 0.1 % external cream     probiotic CAPS     azaTHIOprine (IMURAN) 50 MG tablet     azithromycin (ZITHROMAX) 500 MG tablet     mefloquine (LARIAM) 250 MG tablet     No current facility-administered medications for this visit.     Past Medical History:       Patient Active Problem List   Diagnosis     Osteopenia     Postsurgical Acquired Absence Of Genitalia      Past Medical  History   No past medical history on file.       Assessment & Plan:   # Sjogren's syndrome, facial dysesthesia and neuropathy:  Plan:  - Facial dysestheisa semed to be getting better but then flared after covid booster  - [place] thinks this is consistent with neuropathy and did not think biopsies would . They recommend prednisone burst if symptoms get worse.  - Currently resolved with gabapentin 100mg daily  - Continue tylenol and use lidocaine (refilled) as needed (has not tried the lidocaine yet,m but will try)     # Linear Morphea on forehead (clinical diagnosis):  4.2 cm today. Possibly a little extension into orbital rim. Clinically still hard to see  - Recently to methotrexate   - She had side effects due to hydroxychloroquine  - Only has been taking 5mg methotrexate. She will increase to 15mg weekly and folic acid daily. She will increae her self 1 pill weekly as tolerated. CBC and CMP evrery 2 weeks until at 15mg. This paln s as per pt request (she is on doxycycline and asa which can increase the methotrexate level.+)   -  If getting worse before follow-up, will let me know ASA  - She has had shingles and pneumonia vaccines. Boosted x1 for covid, Planning 2nd.     # Rosacea  - Elidel cream BID   - Doxycyline 20mg BID    # PMLE?  - Sunscreen and elidel as needed    Aureliano Michael MD, FAAD, FACP     Departments of Internal Medicine and Dermatology  [facility]  [phone]  Follow-up: 8 week(s) phone

## 2022-07-28 NOTE — PATIENT INSTRUCTIONS
Continue gabapebntin 100mg nightly   Methotrexate increase to 6 pills (15mg weekly) and folic acid daily  OK to increase folic acid to 2-3mg daily not feeling great  Labs every 2 weeks for 12 weeksPhone visit in 8 weeks

## 2022-07-28 NOTE — NURSING NOTE
Chief Complaint   Patient presents with     RECHECK     Follow Up - 12 weeks     /72   Pulse 65   Temp 97.5  F (36.4  C)   Resp 18   Wt 69.9 kg (154 lb)   SpO2 97%   Hitesh Wood on 7/28/2022 at 9:28 AM

## 2022-08-11 ENCOUNTER — LAB (OUTPATIENT)
Dept: LAB | Facility: CLINIC | Age: 72
End: 2022-08-11
Payer: COMMERCIAL

## 2022-08-11 DIAGNOSIS — Z79.899 ENCOUNTER FOR LONG-TERM (CURRENT) USE OF HIGH-RISK MEDICATION: ICD-10-CM

## 2022-08-11 DIAGNOSIS — L94.0 MORPHEA: ICD-10-CM

## 2022-08-11 LAB
BASOPHILS # BLD AUTO: 0 10E3/UL (ref 0–0.2)
BASOPHILS NFR BLD AUTO: 0 %
EOSINOPHIL # BLD AUTO: 0.1 10E3/UL (ref 0–0.7)
EOSINOPHIL NFR BLD AUTO: 3 %
ERYTHROCYTE [DISTWIDTH] IN BLOOD BY AUTOMATED COUNT: 14.2 % (ref 10–15)
HCT VFR BLD AUTO: 43 % (ref 35–47)
HGB BLD-MCNC: 14.3 G/DL (ref 11.7–15.7)
LYMPHOCYTES # BLD AUTO: 1.2 10E3/UL (ref 0.8–5.3)
LYMPHOCYTES NFR BLD AUTO: 34 %
MCH RBC QN AUTO: 30.6 PG (ref 26.5–33)
MCHC RBC AUTO-ENTMCNC: 33.3 G/DL (ref 31.5–36.5)
MCV RBC AUTO: 92 FL (ref 78–100)
MONOCYTES # BLD AUTO: 0.2 10E3/UL (ref 0–1.3)
MONOCYTES NFR BLD AUTO: 5 %
NEUTROPHILS # BLD AUTO: 2.2 10E3/UL (ref 1.6–8.3)
NEUTROPHILS NFR BLD AUTO: 58 %
PLATELET # BLD AUTO: 169 10E3/UL (ref 150–450)
RBC # BLD AUTO: 4.68 10E6/UL (ref 3.8–5.2)
WBC # BLD AUTO: 3.7 10E3/UL (ref 4–11)

## 2022-08-11 PROCEDURE — 36415 COLL VENOUS BLD VENIPUNCTURE: CPT

## 2022-08-11 PROCEDURE — 85025 COMPLETE CBC W/AUTO DIFF WBC: CPT

## 2022-08-11 PROCEDURE — 80053 COMPREHEN METABOLIC PANEL: CPT

## 2022-08-12 LAB
ALBUMIN SERPL-MCNC: 3.5 G/DL (ref 3.4–5)
ALP SERPL-CCNC: 125 U/L (ref 40–150)
ALT SERPL W P-5'-P-CCNC: 39 U/L (ref 0–50)
ANION GAP SERPL CALCULATED.3IONS-SCNC: 6 MMOL/L (ref 3–14)
AST SERPL W P-5'-P-CCNC: 30 U/L (ref 0–45)
BILIRUB SERPL-MCNC: 0.3 MG/DL (ref 0.2–1.3)
BUN SERPL-MCNC: 15 MG/DL (ref 7–30)
CALCIUM SERPL-MCNC: 8.9 MG/DL (ref 8.5–10.1)
CHLORIDE BLD-SCNC: 111 MMOL/L (ref 94–109)
CO2 SERPL-SCNC: 25 MMOL/L (ref 20–32)
CREAT SERPL-MCNC: 0.78 MG/DL (ref 0.52–1.04)
GFR SERPL CREATININE-BSD FRML MDRD: 80 ML/MIN/1.73M2
GLUCOSE BLD-MCNC: 94 MG/DL (ref 70–99)
POTASSIUM BLD-SCNC: 4.3 MMOL/L (ref 3.4–5.3)
PROT SERPL-MCNC: 6.9 G/DL (ref 6.8–8.8)
SODIUM SERPL-SCNC: 142 MMOL/L (ref 133–144)

## 2022-08-25 ENCOUNTER — LAB (OUTPATIENT)
Dept: LAB | Facility: CLINIC | Age: 72
End: 2022-08-25
Payer: COMMERCIAL

## 2022-08-25 DIAGNOSIS — Z79.899 ENCOUNTER FOR LONG-TERM (CURRENT) USE OF HIGH-RISK MEDICATION: ICD-10-CM

## 2022-08-25 DIAGNOSIS — L94.0 MORPHEA: ICD-10-CM

## 2022-08-25 LAB
ALBUMIN SERPL-MCNC: 3.6 G/DL (ref 3.4–5)
ALP SERPL-CCNC: 131 U/L (ref 40–150)
ALT SERPL W P-5'-P-CCNC: 35 U/L (ref 0–50)
ANION GAP SERPL CALCULATED.3IONS-SCNC: 6 MMOL/L (ref 3–14)
AST SERPL W P-5'-P-CCNC: 21 U/L (ref 0–45)
BASOPHILS # BLD AUTO: 0 10E3/UL (ref 0–0.2)
BASOPHILS NFR BLD AUTO: 0 %
BILIRUB SERPL-MCNC: 0.4 MG/DL (ref 0.2–1.3)
BUN SERPL-MCNC: 18 MG/DL (ref 7–30)
CALCIUM SERPL-MCNC: 9.1 MG/DL (ref 8.5–10.1)
CHLORIDE BLD-SCNC: 108 MMOL/L (ref 94–109)
CO2 SERPL-SCNC: 27 MMOL/L (ref 20–32)
CREAT SERPL-MCNC: 0.83 MG/DL (ref 0.52–1.04)
EOSINOPHIL # BLD AUTO: 0.1 10E3/UL (ref 0–0.7)
EOSINOPHIL NFR BLD AUTO: 3 %
ERYTHROCYTE [DISTWIDTH] IN BLOOD BY AUTOMATED COUNT: 14.5 % (ref 10–15)
GFR SERPL CREATININE-BSD FRML MDRD: 74 ML/MIN/1.73M2
GLUCOSE BLD-MCNC: 101 MG/DL (ref 70–99)
HCT VFR BLD AUTO: 43.2 % (ref 35–47)
HGB BLD-MCNC: 14.5 G/DL (ref 11.7–15.7)
LYMPHOCYTES # BLD AUTO: 1.2 10E3/UL (ref 0.8–5.3)
LYMPHOCYTES NFR BLD AUTO: 34 %
MCH RBC QN AUTO: 31.5 PG (ref 26.5–33)
MCHC RBC AUTO-ENTMCNC: 33.6 G/DL (ref 31.5–36.5)
MCV RBC AUTO: 94 FL (ref 78–100)
MONOCYTES # BLD AUTO: 0.2 10E3/UL (ref 0–1.3)
MONOCYTES NFR BLD AUTO: 6 %
NEUTROPHILS # BLD AUTO: 2 10E3/UL (ref 1.6–8.3)
NEUTROPHILS NFR BLD AUTO: 57 %
PLATELET # BLD AUTO: 176 10E3/UL (ref 150–450)
POTASSIUM BLD-SCNC: 3.6 MMOL/L (ref 3.4–5.3)
PROT SERPL-MCNC: 7 G/DL (ref 6.8–8.8)
RBC # BLD AUTO: 4.61 10E6/UL (ref 3.8–5.2)
SODIUM SERPL-SCNC: 141 MMOL/L (ref 133–144)
WBC # BLD AUTO: 3.5 10E3/UL (ref 4–11)

## 2022-08-25 PROCEDURE — 85025 COMPLETE CBC W/AUTO DIFF WBC: CPT

## 2022-08-25 PROCEDURE — 36415 COLL VENOUS BLD VENIPUNCTURE: CPT

## 2022-08-25 PROCEDURE — 80053 COMPREHEN METABOLIC PANEL: CPT

## 2022-09-07 ENCOUNTER — LAB (OUTPATIENT)
Dept: LAB | Facility: CLINIC | Age: 72
End: 2022-09-07
Payer: COMMERCIAL

## 2022-09-07 DIAGNOSIS — L94.0 MORPHEA: ICD-10-CM

## 2022-09-07 DIAGNOSIS — Z79.899 ENCOUNTER FOR LONG-TERM (CURRENT) USE OF HIGH-RISK MEDICATION: ICD-10-CM

## 2022-09-07 LAB
BASOPHILS # BLD AUTO: 0 10E3/UL (ref 0–0.2)
BASOPHILS NFR BLD AUTO: 0 %
EOSINOPHIL # BLD AUTO: 0.1 10E3/UL (ref 0–0.7)
EOSINOPHIL NFR BLD AUTO: 3 %
ERYTHROCYTE [DISTWIDTH] IN BLOOD BY AUTOMATED COUNT: 14.4 % (ref 10–15)
HCT VFR BLD AUTO: 42.1 % (ref 35–47)
HGB BLD-MCNC: 13.9 G/DL (ref 11.7–15.7)
LYMPHOCYTES # BLD AUTO: 1.3 10E3/UL (ref 0.8–5.3)
LYMPHOCYTES NFR BLD AUTO: 27 %
MCH RBC QN AUTO: 31 PG (ref 26.5–33)
MCHC RBC AUTO-ENTMCNC: 33 G/DL (ref 31.5–36.5)
MCV RBC AUTO: 94 FL (ref 78–100)
MONOCYTES # BLD AUTO: 0.4 10E3/UL (ref 0–1.3)
MONOCYTES NFR BLD AUTO: 8 %
NEUTROPHILS # BLD AUTO: 3.1 10E3/UL (ref 1.6–8.3)
NEUTROPHILS NFR BLD AUTO: 62 %
PLATELET # BLD AUTO: 210 10E3/UL (ref 150–450)
RBC # BLD AUTO: 4.48 10E6/UL (ref 3.8–5.2)
WBC # BLD AUTO: 5 10E3/UL (ref 4–11)

## 2022-09-07 PROCEDURE — 36415 COLL VENOUS BLD VENIPUNCTURE: CPT

## 2022-09-07 PROCEDURE — 80053 COMPREHEN METABOLIC PANEL: CPT

## 2022-09-07 PROCEDURE — 85025 COMPLETE CBC W/AUTO DIFF WBC: CPT

## 2022-09-08 LAB
ALBUMIN SERPL-MCNC: 3.6 G/DL (ref 3.4–5)
ALP SERPL-CCNC: 124 U/L (ref 40–150)
ALT SERPL W P-5'-P-CCNC: 72 U/L (ref 0–50)
ANION GAP SERPL CALCULATED.3IONS-SCNC: 3 MMOL/L (ref 3–14)
AST SERPL W P-5'-P-CCNC: 52 U/L (ref 0–45)
BILIRUB SERPL-MCNC: 0.3 MG/DL (ref 0.2–1.3)
BUN SERPL-MCNC: 18 MG/DL (ref 7–30)
CALCIUM SERPL-MCNC: 8.6 MG/DL (ref 8.5–10.1)
CHLORIDE BLD-SCNC: 106 MMOL/L (ref 94–109)
CO2 SERPL-SCNC: 29 MMOL/L (ref 20–32)
CREAT SERPL-MCNC: 0.84 MG/DL (ref 0.52–1.04)
GFR SERPL CREATININE-BSD FRML MDRD: 73 ML/MIN/1.73M2
GLUCOSE BLD-MCNC: 102 MG/DL (ref 70–99)
POTASSIUM BLD-SCNC: 4.2 MMOL/L (ref 3.4–5.3)
PROT SERPL-MCNC: 7 G/DL (ref 6.8–8.8)
SODIUM SERPL-SCNC: 138 MMOL/L (ref 133–144)

## 2022-09-22 ENCOUNTER — VIRTUAL VISIT (OUTPATIENT)
Dept: DERMATOLOGY | Facility: CLINIC | Age: 72
End: 2022-09-22
Attending: DERMATOLOGY
Payer: COMMERCIAL

## 2022-09-22 DIAGNOSIS — R20.8 DYSESTHESIA: Primary | ICD-10-CM

## 2022-09-22 DIAGNOSIS — L94.0 MORPHEA: ICD-10-CM

## 2022-09-22 PROCEDURE — 99214 OFFICE O/P EST MOD 30 MIN: CPT | Mod: 95 | Performed by: DERMATOLOGY

## 2022-09-22 NOTE — PROGRESS NOTES
[facility] Dermatology Note  Encounter Date: Sep 22, 2022  Office Visit     Dermatology Problem List:  1.Sjogren syndrome (+ RUSLAN 1:320, speckeled, + Ro60 234, + SSA 4.6, bneg monoclonal on spep, normal complements, weak + Phospholuipid ab 18.4)  - RF neg, C3/c4 wnl, ds-DNA neg, Immunoglobulins nml, hep c ne  - Tried plaquenil but complained of nausea, hair loss, dizziness  - Salivary glands scan wnl  2. Burning dysesthesia on face  3. Anhidrosis   -  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 SHe thinks that is since childhood.  - Other autonomic reflex screen was minimally abnormal. Isolated finding of focal reduced sweat response to the proximal leg with normal cardiovagal and adrenergic reflexes, which was of uncertain significance.   - Brain MRI wnl.  4. Rosacea   5. Osteopenia  6. S/p Hysterectomy with bilateral salpingo-oophorectomy for endometriosis, age 36.  7. 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 Hx of vitamin D deficiency   9. Diminished thyroid uptake on lerft compared to rt on scan  ____________________________________________    Assessment & Plan:   #. 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  Looks stable in pictures.  Plan:  Continue 15 mg methotrexate weekly and folic acid daily.  -Labs pending ( did them  at 9 am today from [place]); will check them to decide further change in her methotrexate dose if needed or if we need to change her medication.    - She has had shingles and pneumonia vaccines. Boosted x1 for covid, Planning 2nd.    # Sjogren's syndrome, facial dysesthesia and neuropathy  -Currently improved.    Plan:  Continue gabapentin 100mg daily.      Follow-up: 8 week(s) in-person, or earlier for new or changing lesions    Staff:     Aureliano Michael MD, FAAD, FACP     Departments of Internal  Medicine and Dermatology  [facility]  [phone]    ____________________________________________    CC: f/u dysesthia and morphea    HPI:  Ms. Pily Mejia is a(n) 72 year old female who presents today for follow-up  for neuropathy, facial dysesthesia and scleroderma.   She feels her facial dysesthesia has resolved currently on the gabapentin 100mg at night. No side effects.  She complained of thickening of her forehead skin last time ; no associted itch or pain.She feels it hasn't extended but has gotten slightly deeper?She sent pictures.    Her rosacea is better ; uses Pimecrolimus and takes doxycycline;tolerating it well.  Previously had hot flashes at night. Now resolved.   . + dry eyes and dry mouth  -   -Mild fatigue and  joint pain; especially hip joint; feels they were better before on azathioprine.    She's currently on 15 mg methotrexate since five weeks, tolerating it well, no GI side effects; but her labs were slightly raised (high ALT ) on her last lab (9/7/2022); was advised to repeat them.      Patient is otherwise feeling well, without additional skin concerns.    Labs Reviewed:  CMP(9/7/2022): ALT: 72,AST: 52    Physical Exam:  SKIN: On pictures:  Indented plaque on her forehead going up to her mid forehead down to rt medial eyebrown area and nasal bridge.Looks stable.    - No other lesions of concern on areas examined.     Medications:  Current Outpatient Medications   Medication     acetaminophen (TYLENOL) 500 MG tablet     aspirin 81 MG EC tablet     doxycycline hyclate (PERIOSTAT) 20 MG tablet     gabapentin (NEURONTIN) 100 MG capsule     lidocaine (LMX4) 4 % external cream     methotrexate sodium 2.5 MG TABS     Azelastine HCl 137 MCG/SPRAY SOLN     Calcium Carbonate Antacid 1177 MG CHEW     diclofenac (VOLTAREN) 1 % topical gel     fluticasone (FLONASE) 50 MCG/ACT nasal spray     mometasone (ELOCON) 0.1 % external cream     probiotic CAPS     No current facility-administered  medications for this visit.      Past Medical History:   Patient Active Problem List   Diagnosis     Osteopenia     Postsurgical Acquired Absence Of Genitalia     No past medical history on file.    CC Referred Self, MD  No address on file on close of this encounter.

## 2022-09-22 NOTE — LETTER
Date:September 23, 2022      Patient was self referred, no letter generated. Do not send.        [facility] Health Information

## 2022-09-22 NOTE — PROGRESS NOTES
Pily is a 72 year old who is being evaluated via a billable telephone visit.      What phone number would you like to be contacted at? [phone]  How would you like to obtain your AVS? [place]

## 2022-09-22 NOTE — LETTER
9/22/2022       RE: Pily Mejia  [address] [address]     Dear Colleague,    Thank you for referring your patient, Pily Mejia, to the [facility] RHEUMATOLOGY [facility]IN[facility] [place] at [facility]. Please see a copy of my visit note below.    Pily is a 72 year old who is being evaluated via a billable telephone visit.      What phone number would you like to be contacted at? [phone]  How would you like to obtain your AVS? [place]      [facility] Dermatology Note  Encounter Date: Sep 22, 2022  Office Visit     Dermatology Problem List:  1.Sjogren syndrome (+ RUSLAN 1:320, speckeled, + Ro60 234, + SSA 4.6, bneg monoclonal on spep, normal complements, weak + Phospholuipid ab 18.4)  - RF neg, C3/c4 wnl, ds-DNA neg, Immunoglobulins nml, hep c ne  - Tried plaquenil but complained of nausea, hair loss, dizziness  - Salivary glands scan wnl  2. Burning dysesthesia on face  3. Anhidrosis   -  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 SHe thinks that is since childhood.  - Other autonomic reflex screen was minimally abnormal. Isolated finding of focal reduced sweat response to the proximal leg with normal cardiovagal and adrenergic reflexes, which was of uncertain significance.   - Brain MRI wnl.  4. Rosacea   5. Osteopenia  6. S/p Hysterectomy with bilateral salpingo-oophorectomy for endometriosis, age 36.  7. 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 Hx of vitamin D deficiency   9. Diminished thyroid uptake on lerft compared to rt on scan  ____________________________________________    Assessment & Plan:   #. 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  Looks stable in pictures.  Plan:  Continue 15 mg methotrexate weekly and folic acid daily.  -Labs pending ( did them  at 9 am  today from [place]); will check them to decide further change in her methotrexate dose if needed or if we need to change her medication.    - She has had shingles and pneumonia vaccines. Boosted x1 for covid, Planning 2nd.    # Sjogren's syndrome, facial dysesthesia and neuropathy  -Currently improved.    Plan:  Continue gabapentin 100mg daily.      Follow-up: 8 week(s) in-person, or earlier for new or changing lesions    Staff:     Aureliano Michael MD, FAAD, FACP     Departments of Internal Medicine and Dermatology  [facility]  [phone]    ____________________________________________    CC: f/u dysesthia and morphea    HPI:  Ms. Pily Mejia is a(n) 72 year old female who presents today for follow-up  for neuropathy, facial dysesthesia and scleroderma.   She feels her facial dysesthesia has resolved currently on the gabapentin 100mg at night. No side effects.  She complained of thickening of her forehead skin last time ; no associted itch or pain.She feels it hasn't extended but has gotten slightly deeper?She sent pictures.    Her rosacea is better ; uses Pimecrolimus and takes doxycycline;tolerating it well.  Previously had hot flashes at night. Now resolved.   . + dry eyes and dry mouth  -   -Mild fatigue and  joint pain; especially hip joint; feels they were better before on azathioprine.    She's currently on 15 mg methotrexate since five weeks, tolerating it well, no GI side effects; but her labs were slightly raised (high ALT ) on her last lab (9/7/2022); was advised to repeat them.      Patient is otherwise feeling well, without additional skin concerns.    Labs Reviewed:  CMP(9/7/2022): ALT: 72,AST: 52    Physical Exam:  SKIN: On pictures:  Indented plaque on her forehead going up to her mid forehead down to rt medial eyebrown area and nasal bridge.Looks stable.    - No other lesions of concern on areas examined.     Medications:  Current Outpatient Medications   Medication      acetaminophen (TYLENOL) 500 MG tablet     aspirin 81 MG EC tablet     doxycycline hyclate (PERIOSTAT) 20 MG tablet     gabapentin (NEURONTIN) 100 MG capsule     lidocaine (LMX4) 4 % external cream     methotrexate sodium 2.5 MG TABS     Azelastine HCl 137 MCG/SPRAY SOLN     Calcium Carbonate Antacid 1177 MG CHEW     diclofenac (VOLTAREN) 1 % topical gel     fluticasone (FLONASE) 50 MCG/ACT nasal spray     mometasone (ELOCON) 0.1 % external cream     probiotic CAPS     No current facility-administered medications for this visit.      Past Medical History:   Patient Active Problem List   Diagnosis     Osteopenia     Postsurgical Acquired Absence Of Genitalia     No past medical history on file.    CC Referred Self, MD  No address on file on close of this encounter.      Again, thank you for allowing me to participate in the care of your patient.      Sincerely,    Aureliano Michael MD

## 2022-10-07 ENCOUNTER — MYC MEDICAL ADVICE (OUTPATIENT)
Dept: DERMATOLOGY | Facility: CLINIC | Age: 72
End: 2022-10-07

## 2022-10-07 NOTE — TELEPHONE ENCOUNTER
FUTURE VISIT INFORMATION      FUTURE VISIT INFORMATION:    Date: 11.18.22    Time: 9:00    Location: [facility]  REFERRAL INFORMATION:    Referring provider:  Alec    Referring providers clinic:  Derm    Reason for visit/diagnosis  scleroderma, rosacea and receeding skin/ former Dr. Michael    RECORDS REQUESTED FROM:       Clinic name Comments Records Status Imaging Status   Derm 9.22.22, 7.28.22, 5.5.22 + more with  [facility] Epic Epic

## 2022-10-15 ENCOUNTER — HEALTH MAINTENANCE LETTER (OUTPATIENT)
Age: 72
End: 2022-10-15

## 2022-11-18 ENCOUNTER — OFFICE VISIT (OUTPATIENT)
Dept: DERMATOLOGY | Facility: CLINIC | Age: 72
End: 2022-11-18
Payer: COMMERCIAL

## 2022-11-18 ENCOUNTER — PRE VISIT (OUTPATIENT)
Dept: DERMATOLOGY | Facility: CLINIC | Age: 72
End: 2022-11-18

## 2022-11-18 ENCOUNTER — LAB (OUTPATIENT)
Dept: LAB | Facility: CLINIC | Age: 72
End: 2022-11-18
Payer: COMMERCIAL

## 2022-11-18 DIAGNOSIS — L94.1 LINEAR MORPHEA: Primary | ICD-10-CM

## 2022-11-18 DIAGNOSIS — Z79.899 ENCOUNTER FOR LONG-TERM (CURRENT) USE OF HIGH-RISK MEDICATION: ICD-10-CM

## 2022-11-18 DIAGNOSIS — L71.9 ROSACEA: ICD-10-CM

## 2022-11-18 DIAGNOSIS — L94.0 MORPHEA: ICD-10-CM

## 2022-11-18 DIAGNOSIS — L94.1 LINEAR MORPHEA: ICD-10-CM

## 2022-11-18 LAB
ALBUMIN SERPL BCG-MCNC: 4.4 G/DL (ref 3.5–5.2)
ALP SERPL-CCNC: 134 U/L (ref 35–104)
ALT SERPL W P-5'-P-CCNC: 44 U/L (ref 10–35)
ANION GAP SERPL CALCULATED.3IONS-SCNC: 10 MMOL/L (ref 7–15)
AST SERPL W P-5'-P-CCNC: 32 U/L (ref 10–35)
BASOPHILS # BLD AUTO: 0 10E3/UL (ref 0–0.2)
BASOPHILS NFR BLD AUTO: 1 %
BILIRUB SERPL-MCNC: 0.4 MG/DL
BUN SERPL-MCNC: 12.6 MG/DL (ref 8–23)
CALCIUM SERPL-MCNC: 9.7 MG/DL (ref 8.8–10.2)
CHLORIDE SERPL-SCNC: 103 MMOL/L (ref 98–107)
CREAT SERPL-MCNC: 0.88 MG/DL (ref 0.51–0.95)
DEPRECATED HCO3 PLAS-SCNC: 29 MMOL/L (ref 22–29)
EOSINOPHIL # BLD AUTO: 0.1 10E3/UL (ref 0–0.7)
EOSINOPHIL NFR BLD AUTO: 2 %
ERYTHROCYTE [DISTWIDTH] IN BLOOD BY AUTOMATED COUNT: 13.9 % (ref 10–15)
GFR SERPL CREATININE-BSD FRML MDRD: 69 ML/MIN/1.73M2
GLUCOSE SERPL-MCNC: 102 MG/DL (ref 70–99)
HCT VFR BLD AUTO: 42.1 % (ref 35–47)
HGB BLD-MCNC: 14.1 G/DL (ref 11.7–15.7)
IMM GRANULOCYTES # BLD: 0 10E3/UL
IMM GRANULOCYTES NFR BLD: 0 %
LYMPHOCYTES # BLD AUTO: 1.4 10E3/UL (ref 0.8–5.3)
LYMPHOCYTES NFR BLD AUTO: 33 %
MCH RBC QN AUTO: 31.4 PG (ref 26.5–33)
MCHC RBC AUTO-ENTMCNC: 33.5 G/DL (ref 31.5–36.5)
MCV RBC AUTO: 94 FL (ref 78–100)
MONOCYTES # BLD AUTO: 0.3 10E3/UL (ref 0–1.3)
MONOCYTES NFR BLD AUTO: 7 %
NEUTROPHILS # BLD AUTO: 2.4 10E3/UL (ref 1.6–8.3)
NEUTROPHILS NFR BLD AUTO: 57 %
NRBC # BLD AUTO: 0 10E3/UL
NRBC BLD AUTO-RTO: 0 /100
PLATELET # BLD AUTO: 239 10E3/UL (ref 150–450)
POTASSIUM SERPL-SCNC: 4.5 MMOL/L (ref 3.4–5.3)
PROT SERPL-MCNC: 7.3 G/DL (ref 6.4–8.3)
RBC # BLD AUTO: 4.49 10E6/UL (ref 3.8–5.2)
SODIUM SERPL-SCNC: 142 MMOL/L (ref 136–145)
WBC # BLD AUTO: 4.2 10E3/UL (ref 4–11)

## 2022-11-18 PROCEDURE — 36415 COLL VENOUS BLD VENIPUNCTURE: CPT | Performed by: PATHOLOGY

## 2022-11-18 PROCEDURE — 99214 OFFICE O/P EST MOD 30 MIN: CPT | Performed by: DERMATOLOGY

## 2022-11-18 PROCEDURE — 85025 COMPLETE CBC W/AUTO DIFF WBC: CPT | Performed by: PATHOLOGY

## 2022-11-18 PROCEDURE — 80053 COMPREHEN METABOLIC PANEL: CPT | Performed by: PATHOLOGY

## 2022-11-18 RX ORDER — METHOTREXATE 2.5 MG/1
TABLET ORAL
Qty: 32 TABLET | Refills: 4 | Status: SHIPPED | OUTPATIENT
Start: 2022-11-18 | End: 2023-02-23

## 2022-11-18 RX ORDER — PIMECROLIMUS 10 MG/G
CREAM TOPICAL 2 TIMES DAILY
Qty: 60 G | Refills: 4 | Status: SHIPPED | OUTPATIENT
Start: 2022-11-18 | End: 2023-02-23

## 2022-11-18 RX ORDER — FOLIC ACID 1 MG/1
1 TABLET ORAL DAILY
Qty: 180 TABLET | Refills: 1 | Status: SHIPPED | OUTPATIENT
Start: 2022-11-18 | End: 2023-02-23

## 2022-11-18 ASSESSMENT — PAIN SCALES - GENERAL: PAINLEVEL: NO PAIN (0)

## 2022-11-18 NOTE — LETTER
11/18/2022       RE: Pily Mejia  [address] [address]     Dear Colleague,    Thank you for referring your patient, Pily Mejia, to the [facility] DERMATOLOGY CLINIC [place] at [facility]. Please see a copy of my visit note below.    [facility] Dermatology Note  Encounter Date: Nov 18, 2022  Office Visit     Dermatology Problem List:  1. 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 Sjogren syndrome (+RUSLAN 1:320, speckled, +Ro60 234, +SSA 4.6, weak +APLA 18.4)  - RF-, C3/C4 normal, dsDNA-, Ig normal, HCV-, SPEP normal  - prior: hydroxychloroquine (nausea, hair loss, dizziness)  - Salivary glands scan wnl  3. Burning dysesthesia on face  - gabapentin 100 mg daily  4. Anhidrosis   -  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 SHe thinks that is since childhood.  - Other autonomic reflex screen was minimally abnormal. Isolated finding of focal reduced sweat response to the proximal leg with normal cardiovagal and adrenergic reflexes, which was of uncertain significance.   - Brain MRI wnl.  5. Rosacea   - metronidazole cream daily; pimecrolimus if needed in future  - prior tx: doxy  6. Osteopenia  7. S/p Hysterectomy with bilateral salpingo-oophorectomy for endometriosis, age 36.  8. 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 Hx of vitamin D deficiency   10. Diminished thyroid uptake on lerft compared to rt on scan    ____________________________________________    Assessment & Plan:     1. 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 Of note, placed on azathioprine by rheumatologist to help with joint symptoms. Since being on  for 1 week noticing irritation of stomach. Discussed options to help avoid this since doxycycline, methotrexate and azathioprine can all cause GI symptoms by either switching to injectable methotrexate, switching to mycophenolate, increasing methotrexate and discontinuing azathioprine. Risks and benefits discussed with patient and prefers to increase methotrexate and discontinue azathioprine.  Plan:  - Increase to 20 mg methotrexate weekly and folic acid daily.  - Stop azathioprine. OK to discontinue aspirin since no CVD history and not prescribed by a physician  -Labs ordered of CBC, CMP; will check them 2-3 weeks after increasing dose     2. Sjogren's syndrome, facial dysesthesia and neuropathy  -Currently improved.  -Continue gabapentin 100 mg, okay to increase to 200 mg if needed for symptom control    3. Rosacea; stable to resolved  - D/c Doxycyline 20 mg BID  - Start metronidazole cream daily  - If after 2-3 months rosacea activity is worse, can add back in pimecrolimus cream at night. If still needing more control will discuss other treatment options further at next visit  - Sun protection encouraged      Follow-up: 3 month(s) in-person, or earlier for new or changing lesions    Staff and Resident:    I, Chance Herr MD, discussed and evaluated the patient with Dr. Araujo.    Staff Physician Comments:   I saw and evaluated the patient with the resident and I edited the assessment and plan as documented in the note. I was present for the examination.    Eris Araujo MD   of Dermatology  Department of Dermatology  [facility] [employer]      ____________________________________________    CC: Derm Problem (Pily is here for Morphea and Rosacea. States she has sjogren's syndrome.)    HPI:  Ms. Pily Mejia is a(n) 72 year old female who presents today as a return patient for morphea and rosacea. Has not noticed any redness on forehead, wondering if divot is larger.  No similar lesions elsewhere. Facial dysestheisa symptoms are stable with 100 mg gabapentin.    Wondering if can stop doxy for rosacea as much improved and noticing some GI concerns. Not using any topicals. Was placed on azathioprine by rheumatologist a few weeks ago for joint symptoms and having increased GI irritation so has not taken any medications for last few days.    Patient is otherwise feeling well, without additional skin concerns.    Labs Reviewed:  N/A    Physical Exam:  Vitals: There were no vitals taken for this visit.  SKIN: Focused examination of face was performed.  - Linear slightly atrophic indented plaque of central forehead with no erythema or peripheral violaceous borders  - No inflammatory papule or pustules on face  - No other lesions of concern on areas examined.     Medications:  Current Outpatient Medications   Medication     acetaminophen (TYLENOL) 500 MG tablet     gabapentin (NEURONTIN) 100 MG capsule     methotrexate sodium 2.5 MG TABS     aspirin 81 MG EC tablet     Azelastine HCl 137 MCG/SPRAY SOLN     Calcium Carbonate Antacid 1177 MG CHEW     diclofenac (VOLTAREN) 1 % topical gel     doxycycline hyclate (PERIOSTAT) 20 MG tablet     fluticasone (FLONASE) 50 MCG/ACT nasal spray     lidocaine (LMX4) 4 % external cream     mometasone (ELOCON) 0.1 % external cream     probiotic CAPS     No current facility-administered medications for this visit.      Past Medical History:   Patient Active Problem List   Diagnosis     Osteopenia     Postsurgical Acquired Absence Of Genitalia     No past medical history on file.    CC Referred Self, MD  No address on file on close of this encounter.        Again, thank you for allowing me to participate in the care of your patient.      Sincerely,    Eris Araujo MD       No

## 2022-11-18 NOTE — PATIENT INSTRUCTIONS
For morphea plaque on forehead, continue taking methotrexate once weekly and folic acid daily. Stop taking azathioprine. Increase methotrexate to 8 pills daily. Get labs today and then in 2-3 weeks after starting the increased dose. Your skin is looking great. Once morphea inflammation has stopped, we have treatments that are available to treat the areas affected with fillers.    For rosacea, stop taking doxycycline. We will add in metronidazole cream you can use daily. If after 2-3 months rosacea activity is worse, can add back in pimecrolimus cream at night. If still needing more control can discuss other treatment options further.    For facial symptoms, keep taking gabapentin 100 mg nightly. If need to for symptom control can increase to 200 mg nightly.    Recommend taking azathioprine with food. If a cardiologist, neurologist or primary doctor has prescribed you aspirin, keep taking it otherwise it is okay to stop.

## 2022-11-18 NOTE — NURSING NOTE
Dermatology Rooming Note    Pily Mejia's goals for this visit include:   Chief Complaint   Patient presents with     Derm Problem     Pily is here for Morphea and Rosacea. States she has sjogren's syndrome.     Gerson Hoyt, EMT

## 2022-11-18 NOTE — PROGRESS NOTES
[facility] Dermatology Note  Encounter Date: Nov 18, 2022  Office Visit     Dermatology Problem List:  1. 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 Sjogren syndrome (+RUSLAN 1:320, speckled, +Ro60 234, +SSA 4.6, weak +APLA 18.4)  - RF-, C3/C4 normal, dsDNA-, Ig normal, HCV-, SPEP normal  - prior: hydroxychloroquine (nausea, hair loss, dizziness)  - Salivary glands scan wnl  3. Burning dysesthesia on face  - gabapentin 100 mg daily  4. Anhidrosis   -  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 SHe thinks that is since childhood.  - Other autonomic reflex screen was minimally abnormal. Isolated finding of focal reduced sweat response to the proximal leg with normal cardiovagal and adrenergic reflexes, which was of uncertain significance.   - Brain MRI wnl.  5. Rosacea   - metronidazole cream daily; pimecrolimus if needed in future  - prior tx: doxy  6. Osteopenia  7. S/p Hysterectomy with bilateral salpingo-oophorectomy for endometriosis, age 36.  8. 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 Hx of vitamin D deficiency   10. Diminished thyroid uptake on lerft compared to rt on scan    ____________________________________________    Assessment & Plan:     1. 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 Of note, placed on azathioprine by rheumatologist to help with joint symptoms. Since being on for 1 week noticing irritation of stomach. Discussed options to help avoid this since doxycycline, methotrexate and azathioprine can all cause GI symptoms by either switching to injectable methotrexate, switching to mycophenolate, increasing methotrexate and discontinuing azathioprine. Risks and benefits discussed  with patient and prefers to increase methotrexate and discontinue azathioprine.  Plan:  - Increase to 20 mg methotrexate weekly and folic acid daily.  - Stop azathioprine. OK to discontinue aspirin since no CVD history and not prescribed by a physician  -Labs ordered of CBC, CMP; will check them 2-3 weeks after increasing dose     2. Sjogren's syndrome, facial dysesthesia and neuropathy  -Currently improved.  -Continue gabapentin 100 mg, okay to increase to 200 mg if needed for symptom control    3. Rosacea; stable to resolved  - D/c Doxycyline 20 mg BID  - Start metronidazole cream daily  - If after 2-3 months rosacea activity is worse, can add back in pimecrolimus cream at night. If still needing more control will discuss other treatment options further at next visit  - Sun protection encouraged      Follow-up: 3 month(s) in-person, or earlier for new or changing lesions    Staff and Resident:    I, Chance Herr MD, discussed and evaluated the patient with Dr. Araujo.    Staff Physician Comments:   I saw and evaluated the patient with the resident and I edited the assessment and plan as documented in the note. I was present for the examination.    Eris Araujo MD   of Dermatology  Department of Dermatology  [facility] [employer]      ____________________________________________    CC: Derm Problem (Pily is here for Morphea and Rosacea. States she has sjogren's syndrome.)    HPI:  Ms. Pily Mejia is a(n) 72 year old female who presents today as a return patient for morphea and rosacea. Has not noticed any redness on forehead, wondering if divot is larger. No similar lesions elsewhere. Facial dysestheisa symptoms are stable with 100 mg gabapentin.    Wondering if can stop doxy for rosacea as much improved and noticing some GI concerns. Not using any topicals. Was placed on azathioprine by rheumatologist a few weeks ago for joint symptoms and having increased GI  irritation so has not taken any medications for last few days.    Patient is otherwise feeling well, without additional skin concerns.    Labs Reviewed:  N/A    Physical Exam:  Vitals: There were no vitals taken for this visit.  SKIN: Focused examination of face was performed.  - Linear slightly atrophic indented plaque of central forehead with no erythema or peripheral violaceous borders  - No inflammatory papule or pustules on face  - No other lesions of concern on areas examined.     Medications:  Current Outpatient Medications   Medication     acetaminophen (TYLENOL) 500 MG tablet     gabapentin (NEURONTIN) 100 MG capsule     methotrexate sodium 2.5 MG TABS     aspirin 81 MG EC tablet     Azelastine HCl 137 MCG/SPRAY SOLN     Calcium Carbonate Antacid 1177 MG CHEW     diclofenac (VOLTAREN) 1 % topical gel     doxycycline hyclate (PERIOSTAT) 20 MG tablet     fluticasone (FLONASE) 50 MCG/ACT nasal spray     lidocaine (LMX4) 4 % external cream     mometasone (ELOCON) 0.1 % external cream     probiotic CAPS     No current facility-administered medications for this visit.      Past Medical History:   Patient Active Problem List   Diagnosis     Osteopenia     Postsurgical Acquired Absence Of Genitalia     No past medical history on file.    CC Referred Self, MD  No address on file on close of this encounter.

## 2022-11-18 NOTE — LETTER
Date:November 18, 2022      Patient was self referred, no letter generated. Do not send.        [facility] Health Information

## 2022-11-22 ENCOUNTER — TELEPHONE (OUTPATIENT)
Dept: DERMATOLOGY | Facility: CLINIC | Age: 72
End: 2022-11-22

## 2022-11-22 NOTE — TELEPHONE ENCOUNTER
Okay to defer dose increase. Let's recheck labs in 2 weeks. If liver enzymes have normalized, we can consider dose increase at that time. Thanks!

## 2022-11-22 NOTE — TELEPHONE ENCOUNTER
Eris Araujo MD   11/18/2022 12:30 PM CST       Ms. Mejia,  Your labs look stable. One of your liver enzymes is slightly elevated, but it's actually improved from September.   Thank you,  Eris Araujo MD, FAAD   of Dermatology  Department of Dermatology  [facility]

## 2022-11-22 NOTE — TELEPHONE ENCOUNTER
[facility] Call Center    Phone Message    May a detailed message be left on voicemail: no     Reason for Call: Other: Pt states she is returning a missed call from Marycruz for results. Please call Pily back to discuss. Thank you.      Action Taken: Message routed to:  [facility] ([facility] Derm    Travel Screening: Not Applicable

## 2022-11-22 NOTE — TELEPHONE ENCOUNTER
Patient notified of results. She states she does not feel comfortable with her lab results. She does not feel comfortable increase her methotrexate sodium 2.5 MG TABS and have discontinued the hydroxychloroquine. She wants to recheck her lab in 2 weeks to see if her liver enzymes have improved. She was told in the past that the spike in September was a lab error probably so she was not concerned about it at the time. She knows there has been improvement but it is still out of the range she is comfortable with.    Patient states she is no longer going to worry about her joint and wants to worry about her liver. If Dr. Araujo is not okay with this, then she will  her prescription at the pharmacy.    Oswald Rhoades, CMA

## 2022-11-28 NOTE — TELEPHONE ENCOUNTER
Spoke with patient and informed her of the information below. She plans on stating at the same dose and recheck her labs.    Oswald Rhoades, GIBSON

## 2022-12-08 ENCOUNTER — LAB (OUTPATIENT)
Dept: LAB | Facility: CLINIC | Age: 72
End: 2022-12-08
Payer: COMMERCIAL

## 2022-12-08 DIAGNOSIS — L94.1 LINEAR MORPHEA: ICD-10-CM

## 2022-12-08 DIAGNOSIS — Z79.899 ENCOUNTER FOR LONG-TERM (CURRENT) USE OF HIGH-RISK MEDICATION: ICD-10-CM

## 2022-12-08 LAB
ALBUMIN SERPL BCG-MCNC: 4.2 G/DL (ref 3.5–5.2)
ALP SERPL-CCNC: 124 U/L (ref 35–104)
ALT SERPL W P-5'-P-CCNC: 40 U/L (ref 10–35)
ANION GAP SERPL CALCULATED.3IONS-SCNC: 9 MMOL/L (ref 7–15)
AST SERPL W P-5'-P-CCNC: 35 U/L (ref 10–35)
BASOPHILS # BLD AUTO: 0 10E3/UL (ref 0–0.2)
BASOPHILS NFR BLD AUTO: 1 %
BILIRUB SERPL-MCNC: 0.4 MG/DL
BUN SERPL-MCNC: 15.7 MG/DL (ref 8–23)
CALCIUM SERPL-MCNC: 9 MG/DL (ref 8.8–10.2)
CHLORIDE SERPL-SCNC: 106 MMOL/L (ref 98–107)
CREAT SERPL-MCNC: 0.94 MG/DL (ref 0.51–0.95)
DEPRECATED HCO3 PLAS-SCNC: 26 MMOL/L (ref 22–29)
EOSINOPHIL # BLD AUTO: 0.1 10E3/UL (ref 0–0.7)
EOSINOPHIL NFR BLD AUTO: 2 %
ERYTHROCYTE [DISTWIDTH] IN BLOOD BY AUTOMATED COUNT: 13.7 % (ref 10–15)
GFR SERPL CREATININE-BSD FRML MDRD: 64 ML/MIN/1.73M2
GLUCOSE SERPL-MCNC: 93 MG/DL (ref 70–99)
HCT VFR BLD AUTO: 42.1 % (ref 35–47)
HGB BLD-MCNC: 14.3 G/DL (ref 11.7–15.7)
IMM GRANULOCYTES # BLD: 0 10E3/UL
IMM GRANULOCYTES NFR BLD: 0 %
LYMPHOCYTES # BLD AUTO: 1.4 10E3/UL (ref 0.8–5.3)
LYMPHOCYTES NFR BLD AUTO: 36 %
MCH RBC QN AUTO: 32 PG (ref 26.5–33)
MCHC RBC AUTO-ENTMCNC: 34 G/DL (ref 31.5–36.5)
MCV RBC AUTO: 94 FL (ref 78–100)
MONOCYTES # BLD AUTO: 0.2 10E3/UL (ref 0–1.3)
MONOCYTES NFR BLD AUTO: 5 %
NEUTROPHILS # BLD AUTO: 2.2 10E3/UL (ref 1.6–8.3)
NEUTROPHILS NFR BLD AUTO: 55 %
PLATELET # BLD AUTO: 215 10E3/UL (ref 150–450)
POTASSIUM SERPL-SCNC: 4.3 MMOL/L (ref 3.4–5.3)
PROT SERPL-MCNC: 6.9 G/DL (ref 6.4–8.3)
RBC # BLD AUTO: 4.47 10E6/UL (ref 3.8–5.2)
SODIUM SERPL-SCNC: 141 MMOL/L (ref 136–145)
WBC # BLD AUTO: 3.9 10E3/UL (ref 4–11)

## 2022-12-08 PROCEDURE — 36415 COLL VENOUS BLD VENIPUNCTURE: CPT | Performed by: INTERNAL MEDICINE

## 2022-12-08 PROCEDURE — 85025 COMPLETE CBC W/AUTO DIFF WBC: CPT | Performed by: INTERNAL MEDICINE

## 2022-12-08 PROCEDURE — 80053 COMPREHEN METABOLIC PANEL: CPT | Performed by: INTERNAL MEDICINE

## 2022-12-16 DIAGNOSIS — D84.9 IMMUNOSUPPRESSION (H): Primary | ICD-10-CM

## 2023-02-21 ENCOUNTER — MYC MEDICAL ADVICE (OUTPATIENT)
Dept: DERMATOLOGY | Facility: CLINIC | Age: 73
End: 2023-02-21
Payer: COMMERCIAL

## 2023-02-23 ENCOUNTER — VIRTUAL VISIT (OUTPATIENT)
Dept: DERMATOLOGY | Facility: CLINIC | Age: 73
End: 2023-02-23
Payer: COMMERCIAL

## 2023-02-23 ENCOUNTER — MYC MEDICAL ADVICE (OUTPATIENT)
Dept: DERMATOLOGY | Facility: CLINIC | Age: 73
End: 2023-02-23

## 2023-02-23 DIAGNOSIS — D84.9 IMMUNOSUPPRESSION (H): Primary | ICD-10-CM

## 2023-02-23 DIAGNOSIS — L94.0 MORPHEA: ICD-10-CM

## 2023-02-23 DIAGNOSIS — L71.9 ROSACEA: ICD-10-CM

## 2023-02-23 DIAGNOSIS — L94.1 LINEAR MORPHEA: ICD-10-CM

## 2023-02-23 PROCEDURE — 99214 OFFICE O/P EST MOD 30 MIN: CPT | Mod: VID | Performed by: DERMATOLOGY

## 2023-02-23 RX ORDER — METHOTREXATE 2.5 MG/1
10 TABLET ORAL WEEKLY
Qty: 52 TABLET | Refills: 3 | Status: SHIPPED | OUTPATIENT
Start: 2023-02-23 | End: 2023-07-19

## 2023-02-23 RX ORDER — FOLIC ACID 1 MG/1
1 TABLET ORAL DAILY
Qty: 90 TABLET | Refills: 3 | Status: SHIPPED | OUTPATIENT
Start: 2023-02-23 | End: 2023-08-15

## 2023-02-23 RX ORDER — PIMECROLIMUS 10 MG/G
CREAM TOPICAL 2 TIMES DAILY
Qty: 60 G | Refills: 4 | Status: SHIPPED | OUTPATIENT
Start: 2023-02-23 | End: 2023-10-27

## 2023-02-23 NOTE — NURSING NOTE
Chief Complaint   Patient presents with     Follow Up     Linear morphea     Teledermatology Nurse Call Patients:     Are you in the [facility] at the time of the encounter? yes    Today's visit will be billed to you and your insurance.    A teledermatology visit is not as thorough as an in-person visit and the quality of the photograph sent may not be of the same quality as that taken by the dermatology clinic.      Patient states she is currently also taking a combined Vitamin k2 D3 and Vitamin B12     Shelby Kocher

## 2023-02-23 NOTE — LETTER
Date:February 24, 2023      Provider requested that no letter be sent. Do not send.       [facility]

## 2023-02-23 NOTE — PROGRESS NOTES
[facility] Dermatology Note  Encounter Date: Feb 23, 2023  Store-and-Forward and Video (invitation sent by email). Location of teledermatologist: [facility]. Date of images: 02/23/23. Start time: 12:14. End time: 12:34.    Dermatology Problem List:  1. 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 Sjogren syndrome (+RUSLAN 1:320, speckled, +Ro60 234, +SSA 4.6, weak +APLA 18.4)  - RF-, C3/C4 normal, dsDNA-, Ig normal, HCV-, SPEP normal  - prior: hydroxychloroquine (nausea, hair loss, dizziness)  - Salivary glands scan wnl  3. Burning dysesthesia on face  - gabapentin 100 mg daily  4. Anhidrosis   -  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 SHe thinks that is since childhood.  - Other autonomic reflex screen was minimally abnormal. Isolated finding of focal reduced sweat response to the proximal leg with normal cardiovagal and adrenergic reflexes, which was of uncertain significance.   - Brain MRI wnl.  5. Rosacea   - pimecrolimus PRN  - prior tx: doxycycline  6. Osteopenia  7. S/p Hysterectomy with bilateral salpingo-oophorectomy for endometriosis, age 36.  8. 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 Hx of vitamin D deficiency   10. Diminished thyroid uptake on lerft compared to rt on scan       ____________________________________________    Assessment & Plan:     1. Linear morphea: overall doing well with some recession of plaque on the forehead and softening. No active erythema by review of photos. Patient decreased methotrexate to 10 mg weekly due to GI issues and has tolerated this much better. We discussed gradual downtaper of methotrexate q3 months pending clinical response. Query relation of facial dysesthesias to linear morphea; certainly neurologic symptoms may be observed deep to morphea, although in this case the causality is uncertain. Will monitor this with methotrexate  downtaper.  - methotrexate 10 mg weekly with folate  - check CBC, CMP in early March  - gabapentin for neurologic symptoms    2. Rosacea: off doxycycline since returning from [facility] and rosacea overall doing well. Occasional right-sided breakouts. Using pimecrolimus and seems to be helpful.  - pimecrolimus cream    Procedures Performed:    None    Follow-up: 3 months    Staff:     Eris Araujo MD, FAAD   of Dermatology  Department of Dermatology  [facility]    ____________________________________________    CC: Follow Up (Linear morphea)    HPI:  Ms. Pily Mejia is a(n) 72 year old female who presents today as a return patient for linear morphea    Went to [facility] in January - had facial eruption of small bumps 30 min after methotrexate  - stayed on doxycycline 100 mg - stopped when returned  - decreased methotrexate to 10 mg weekly - better GI tolerance (no diarrhea any more); dizziness now just when laying down or getting up - needs to sit for a few seconds to equilibrate  - if turn too quickly when going up stairs - still some vertiginous symptoms but better than when on higher dose  - linear morphea seems to be getting softer    Facial dysesthesias - still taking gabapentin - this has been helpful along with methotrexate    Rosacea - intermittently using pimecrolimus    Patient is otherwise feeling well, without additional skin concerns.    Labs Reviewed:  12/22 CBC unremarkable; CMP with ALT 40    Physical Exam:  Vitals: There were no vitals taken for this visit.  SKIN: Teledermatology photos were reviewed; image quality and interpretability: acceptable. Image date: 2/23/23.  - facial erythema  - induration on the forehead just lateral to midline  - No other lesions of concern on areas examined.     Medications:  Current Outpatient Medications   Medication     acetaminophen (TYLENOL) 500 MG tablet     folic acid (FOLVITE) 1 MG tablet     gabapentin  (NEURONTIN) 100 MG capsule     methotrexate sodium 2.5 MG TABS     pimecrolimus (ELIDEL) 1 % external cream     aspirin (ASA) 81 MG EC tablet     Azelastine HCl 137 MCG/SPRAY SOLN     Calcium Carbonate Antacid 1177 MG CHEW     diclofenac (VOLTAREN) 1 % topical gel     fluticasone (FLONASE) 50 MCG/ACT nasal spray     lidocaine (LMX4) 4 % external cream     metroNIDAZOLE (METROCREAM) 0.75 % external cream     mometasone (ELOCON) 0.1 % external cream     probiotic CAPS     No current facility-administered medications for this visit.      Past Medical/Surgical History:   Patient Active Problem List   Diagnosis     Osteopenia     Postsurgical Acquired Absence Of Genitalia     No past medical history on file.    CC Referred Self, MD  No address on file on close of this encounter.

## 2023-02-23 NOTE — LETTER
2/23/2023       RE: Pily Mejia  [address] [address]     Dear Colleague,    Thank you for referring your patient, Pily Mejia, to the [facility] [place] at [facility]. Please see a copy of my visit note below.    [facility] Dermatology Note  Encounter Date: Feb 23, 2023  Store-and-Forward and Video (invitation sent by email). Location of teledermatologist: [facility] [place]. Date of images: 02/23/23. Start time: 12:14. End time: 12:34.    Dermatology Problem List:  1. 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 Sjogren syndrome (+RUSLAN 1:320, speckled, +Ro60 234, +SSA 4.6, weak +APLA 18.4)  - RF-, C3/C4 normal, dsDNA-, Ig normal, HCV-, SPEP normal  - prior: hydroxychloroquine (nausea, hair loss, dizziness)  - Salivary glands scan wnl  3. Burning dysesthesia on face  - gabapentin 100 mg daily  4. Anhidrosis   -  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 SHe thinks that is since childhood.  - Other autonomic reflex screen was minimally abnormal. Isolated finding of focal reduced sweat response to the proximal leg with normal cardiovagal and adrenergic reflexes, which was of uncertain significance.   - Brain MRI wnl.  5. Rosacea   - pimecrolimus PRN  - prior tx: doxycycline  6. Osteopenia  7. S/p Hysterectomy with bilateral salpingo-oophorectomy for endometriosis, age 36.  8. 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 Hx of vitamin D deficiency   10. Diminished thyroid uptake on lerft compared to rt on scan       ____________________________________________    Assessment & Plan:     1. Linear morphea: overall doing well with some recession of plaque on the forehead and softening. No active erythema by review of photos. Patient decreased methotrexate to 10 mg weekly due to GI issues and has  tolerated this much better. We discussed gradual downtaper of methotrexate q3 months pending clinical response. Query relation of facial dysesthesias to linear morphea; certainly neurologic symptoms may be observed deep to morphea, although in this case the causality is uncertain. Will monitor this with methotrexate downtaper.  - methotrexate 10 mg weekly with folate  - check CBC, CMP in early March  - gabapentin for neurologic symptoms    2. Rosacea: off doxycycline since returning from [facility] and rosacea overall doing well. Occasional right-sided breakouts. Using pimecrolimus and seems to be helpful.  - pimecrolimus cream    Procedures Performed:    None    Follow-up: 3 months    Staff:     Eris Araujo MD, FAAD   of Dermatology  Department of Dermatology  [facility]    ____________________________________________    CC: Follow Up (Linear morphea)    HPI:  Ms. Pily Mejia is a(n) 72 year old female who presents today as a return patient for linear morphea    Went to [facility] in January - had facial eruption of small bumps 30 min after methotrexate  - stayed on doxycycline 100 mg - stopped when returned  - decreased methotrexate to 10 mg weekly - better GI tolerance (no diarrhea any more); dizziness now just when laying down or getting up - needs to sit for a few seconds to equilibrate  - if turn too quickly when going up stairs - still some vertiginous symptoms but better than when on higher dose  - linear morphea seems to be getting softer    Facial dysesthesias - still taking gabapentin - this has been helpful along with methotrexate    Rosacea - intermittently using pimecrolimus    Patient is otherwise feeling well, without additional skin concerns.    Labs Reviewed:  12/22 CBC unremarkable; CMP with ALT 40    Physical Exam:  Vitals: There were no vitals taken for this visit.  SKIN: Teledermatology photos were reviewed; image quality and  interpretability: acceptable. Image date: 2/23/23.  - facial erythema  - induration on the forehead just lateral to midline  - No other lesions of concern on areas examined.     Medications:  Current Outpatient Medications   Medication     acetaminophen (TYLENOL) 500 MG tablet     folic acid (FOLVITE) 1 MG tablet     gabapentin (NEURONTIN) 100 MG capsule     methotrexate sodium 2.5 MG TABS     pimecrolimus (ELIDEL) 1 % external cream     aspirin (ASA) 81 MG EC tablet     Azelastine HCl 137 MCG/SPRAY SOLN     Calcium Carbonate Antacid 1177 MG CHEW     diclofenac (VOLTAREN) 1 % topical gel     fluticasone (FLONASE) 50 MCG/ACT nasal spray     lidocaine (LMX4) 4 % external cream     metroNIDAZOLE (METROCREAM) 0.75 % external cream     mometasone (ELOCON) 0.1 % external cream     probiotic CAPS     No current facility-administered medications for this visit.      Past Medical/Surgical History:   Patient Active Problem List   Diagnosis     Osteopenia     Postsurgical Acquired Absence Of Genitalia     No past medical history on file.    CC Referred Self, MD  No address on file on close of this encounter.        Again, thank you for allowing me to participate in the care of your patient.      Sincerely,    Eris Araujo MD

## 2023-03-03 ENCOUNTER — TELEPHONE (OUTPATIENT)
Dept: DERMATOLOGY | Facility: CLINIC | Age: 73
End: 2023-03-03
Payer: COMMERCIAL

## 2023-03-03 NOTE — TELEPHONE ENCOUNTER
Prior Authorization Retail Medication Request    Medication/Dose:   ICD code (if different than what is on RX):  Rosacea [L71.9]   Previously Tried and Failed:  See chart  Rationale:      Insurance Name:  [employer]   Insurance ID:  [id number]

## 2023-03-07 NOTE — TELEPHONE ENCOUNTER
Central Prior Authorization Team   Phone: [phone]      PA Initiation    Medication: pimecrolimus (ELIDEL) 1 % external cream  Insurance Company: [employer] ([facility]) - Phone [phone] Fax [phone]  Pharmacy Filling the Rx: [id number] - [place], [place] - [address]  Filling Pharmacy Phone: [phone]  Filling Pharmacy Fax:    Start Date: 3/7/2023

## 2023-03-09 NOTE — TELEPHONE ENCOUNTER
PRIOR AUTHORIZATION DENIED    Medication: pimecrolimus (ELIDEL) 1 % external cream    Denial Date: 3/8/2023    Denial Rational:           Appeal Information:

## 2023-03-17 NOTE — TELEPHONE ENCOUNTER
Please contact patient and let her know this is not covered by her insurance and the appeal was denied. It's available on [employer] but is $82.

## 2023-03-20 NOTE — TELEPHONE ENCOUNTER
Attempted to contact pt and discuss PA denial/recomendations to try GoodRx. No answer. LVM to call back.  Bailee Wheeler RN

## 2023-03-21 ENCOUNTER — TELEPHONE (OUTPATIENT)
Dept: DERMATOLOGY | Facility: CLINIC | Age: 73
End: 2023-03-21
Payer: COMMERCIAL

## 2023-03-21 NOTE — TELEPHONE ENCOUNTER
[facility] Call Center    Phone Message    May a detailed message be left on voicemail: yes     Reason for Call: Other: Pt Pily returning call to discuss alternatives since Prior Authorization was denied. Please call back     Action Taken: Message routed to:  [facility] ([facility] Derm    Travel Screening: Not Applicable

## 2023-05-04 ENCOUNTER — MYC MEDICAL ADVICE (OUTPATIENT)
Dept: DERMATOLOGY | Facility: CLINIC | Age: 73
End: 2023-05-04
Payer: COMMERCIAL

## 2023-05-04 DIAGNOSIS — E55.9 VITAMIN D DEFICIENCY: Primary | ICD-10-CM

## 2023-05-04 NOTE — TELEPHONE ENCOUNTER
2/23/2023 Assessment & Plan:      1. Linear morphea: overall doing well with some recession of plaque on the forehead and softening. No active erythema by review of photos. Patient decreased methotrexate to 10 mg weekly due to GI issues and has tolerated this much better. We discussed gradual downtaper of methotrexate q3 months pending clinical response. Query relation of facial dysesthesias to linear morphea; certainly neurologic symptoms may be observed deep to morphea, although in this case the causality is uncertain. Will monitor this with methotrexate downtaper.  - methotrexate 10 mg weekly with folate  - check CBC, CMP in early March  - gabapentin for neurologic symptoms     2. Rosacea: off doxycycline since returning from [place] and rosacea overall doing well. Occasional right-sided breakouts. Using pimecrolimus and seems to be helpful.  - pimecrolimus cream

## 2023-05-09 ENCOUNTER — LAB (OUTPATIENT)
Dept: LAB | Facility: CLINIC | Age: 73
End: 2023-05-09
Payer: COMMERCIAL

## 2023-05-09 DIAGNOSIS — D84.9 IMMUNOSUPPRESSION (H): ICD-10-CM

## 2023-05-09 DIAGNOSIS — E55.9 VITAMIN D DEFICIENCY: ICD-10-CM

## 2023-05-09 DIAGNOSIS — L94.0 MORPHEA: ICD-10-CM

## 2023-05-09 DIAGNOSIS — Z79.899 ENCOUNTER FOR LONG-TERM (CURRENT) USE OF HIGH-RISK MEDICATION: ICD-10-CM

## 2023-05-09 LAB
ALBUMIN SERPL BCG-MCNC: 4.2 G/DL (ref 3.5–5.2)
ALP SERPL-CCNC: 118 U/L (ref 35–104)
ALT SERPL W P-5'-P-CCNC: 28 U/L (ref 10–35)
ANION GAP SERPL CALCULATED.3IONS-SCNC: 12 MMOL/L (ref 7–15)
AST SERPL W P-5'-P-CCNC: 25 U/L (ref 10–35)
BASOPHILS # BLD AUTO: 0 10E3/UL (ref 0–0.2)
BASOPHILS NFR BLD AUTO: 1 %
BILIRUB SERPL-MCNC: 0.2 MG/DL
BUN SERPL-MCNC: 10.5 MG/DL (ref 8–23)
CALCIUM SERPL-MCNC: 9.4 MG/DL (ref 8.8–10.2)
CHLORIDE SERPL-SCNC: 107 MMOL/L (ref 98–107)
CREAT SERPL-MCNC: 0.95 MG/DL (ref 0.51–0.95)
DEPRECATED HCO3 PLAS-SCNC: 26 MMOL/L (ref 22–29)
EOSINOPHIL # BLD AUTO: 0.1 10E3/UL (ref 0–0.7)
EOSINOPHIL NFR BLD AUTO: 3 %
ERYTHROCYTE [DISTWIDTH] IN BLOOD BY AUTOMATED COUNT: 13.8 % (ref 10–15)
GFR SERPL CREATININE-BSD FRML MDRD: 63 ML/MIN/1.73M2
GLUCOSE SERPL-MCNC: 111 MG/DL (ref 70–99)
HCT VFR BLD AUTO: 41.8 % (ref 35–47)
HGB BLD-MCNC: 14 G/DL (ref 11.7–15.7)
IMM GRANULOCYTES # BLD: 0 10E3/UL
IMM GRANULOCYTES NFR BLD: 0 %
LYMPHOCYTES # BLD AUTO: 1.2 10E3/UL (ref 0.8–5.3)
LYMPHOCYTES NFR BLD AUTO: 29 %
MCH RBC QN AUTO: 31.3 PG (ref 26.5–33)
MCHC RBC AUTO-ENTMCNC: 33.5 G/DL (ref 31.5–36.5)
MCV RBC AUTO: 94 FL (ref 78–100)
MONOCYTES # BLD AUTO: 0.3 10E3/UL (ref 0–1.3)
MONOCYTES NFR BLD AUTO: 8 %
NEUTROPHILS # BLD AUTO: 2.3 10E3/UL (ref 1.6–8.3)
NEUTROPHILS NFR BLD AUTO: 59 %
PLATELET # BLD AUTO: 187 10E3/UL (ref 150–450)
POTASSIUM SERPL-SCNC: 4.3 MMOL/L (ref 3.4–5.3)
PROT SERPL-MCNC: 6.8 G/DL (ref 6.4–8.3)
RBC # BLD AUTO: 4.47 10E6/UL (ref 3.8–5.2)
SODIUM SERPL-SCNC: 145 MMOL/L (ref 136–145)
WBC # BLD AUTO: 4 10E3/UL (ref 4–11)

## 2023-05-09 PROCEDURE — 82306 VITAMIN D 25 HYDROXY: CPT | Performed by: INTERNAL MEDICINE

## 2023-05-09 PROCEDURE — 36415 COLL VENOUS BLD VENIPUNCTURE: CPT | Performed by: INTERNAL MEDICINE

## 2023-05-09 PROCEDURE — 80053 COMPREHEN METABOLIC PANEL: CPT | Performed by: INTERNAL MEDICINE

## 2023-05-09 PROCEDURE — 85025 COMPLETE CBC W/AUTO DIFF WBC: CPT | Performed by: INTERNAL MEDICINE

## 2023-05-10 LAB — DEPRECATED CALCIDIOL+CALCIFEROL SERPL-MC: 72 UG/L (ref 20–75)

## 2023-05-11 ENCOUNTER — MYC MEDICAL ADVICE (OUTPATIENT)
Dept: DERMATOLOGY | Facility: CLINIC | Age: 73
End: 2023-05-11
Payer: COMMERCIAL

## 2023-05-11 DIAGNOSIS — R73.9 HYPERGLYCEMIA: Primary | ICD-10-CM

## 2023-05-18 DIAGNOSIS — G62.9 NEUROPATHY: ICD-10-CM

## 2023-05-19 ENCOUNTER — MYC MEDICAL ADVICE (OUTPATIENT)
Dept: DERMATOLOGY | Facility: CLINIC | Age: 73
End: 2023-05-19
Payer: COMMERCIAL

## 2023-05-19 DIAGNOSIS — G62.9 NEUROPATHY: ICD-10-CM

## 2023-05-22 NOTE — TELEPHONE ENCOUNTER
gabapentin (NEURONTIN) 100 MG capsule Take 100mg at night and increase by 100mg at night until 300mg  Last Written Prescription Date:  5/26/22  Last Fill Quantity: 90,   # refills: 3  Last Office Visit : 2/23/23 Araujo  Future Office visit:  6/16/23    Routing refill request to provider for review/approval because:   Refill team is not authorized to refill Gabapentin.  Last SIG: Take 100mg at night and increase by 100mg at night until 300mg

## 2023-05-23 RX ORDER — GABAPENTIN 100 MG/1
CAPSULE ORAL
Qty: 90 CAPSULE | Refills: 3
Start: 2023-05-23

## 2023-05-23 RX ORDER — GABAPENTIN 100 MG/1
CAPSULE ORAL
Qty: 90 CAPSULE | Refills: 3 | Status: SHIPPED | OUTPATIENT
Start: 2023-05-23 | End: 2023-08-15

## 2023-05-30 ENCOUNTER — TELEPHONE (OUTPATIENT)
Dept: DERMATOLOGY | Facility: CLINIC | Age: 73
End: 2023-05-30
Payer: COMMERCIAL

## 2023-05-30 NOTE — TELEPHONE ENCOUNTER
Prior Authorization Retail Medication Request    Medication/Dose: pimecrolimus (ELIDEL) 1 % external cream  ICD code (if different than what is on RX):  Rosacea [L71.9]   Previously Tried and Failed:  See chart  Rationale:      Insurance Name:  [employer]  Insurance ID:  [id number]

## 2023-05-31 ENCOUNTER — LAB (OUTPATIENT)
Dept: LAB | Facility: CLINIC | Age: 73
End: 2023-05-31
Payer: COMMERCIAL

## 2023-05-31 DIAGNOSIS — L94.0 MORPHEA: ICD-10-CM

## 2023-05-31 DIAGNOSIS — R73.9 HYPERGLYCEMIA: ICD-10-CM

## 2023-05-31 DIAGNOSIS — Z79.899 ENCOUNTER FOR LONG-TERM (CURRENT) USE OF HIGH-RISK MEDICATION: ICD-10-CM

## 2023-05-31 LAB
ALBUMIN SERPL BCG-MCNC: 4.2 G/DL (ref 3.5–5.2)
ALP SERPL-CCNC: 118 U/L (ref 35–104)
ALT SERPL W P-5'-P-CCNC: 25 U/L (ref 10–35)
ANION GAP SERPL CALCULATED.3IONS-SCNC: 14 MMOL/L (ref 7–15)
AST SERPL W P-5'-P-CCNC: 27 U/L (ref 10–35)
BASOPHILS # BLD AUTO: 0 10E3/UL (ref 0–0.2)
BASOPHILS NFR BLD AUTO: 1 %
BILIRUB SERPL-MCNC: 0.4 MG/DL
BUN SERPL-MCNC: 11.6 MG/DL (ref 8–23)
CALCIUM SERPL-MCNC: 9.3 MG/DL (ref 8.8–10.2)
CHLORIDE SERPL-SCNC: 106 MMOL/L (ref 98–107)
CREAT SERPL-MCNC: 0.88 MG/DL (ref 0.51–0.95)
DEPRECATED HCO3 PLAS-SCNC: 23 MMOL/L (ref 22–29)
EOSINOPHIL # BLD AUTO: 0.1 10E3/UL (ref 0–0.7)
EOSINOPHIL NFR BLD AUTO: 3 %
ERYTHROCYTE [DISTWIDTH] IN BLOOD BY AUTOMATED COUNT: 13.9 % (ref 10–15)
GFR SERPL CREATININE-BSD FRML MDRD: 69 ML/MIN/1.73M2
GLUCOSE SERPL-MCNC: 97 MG/DL (ref 70–99)
HBA1C MFR BLD: 5.7 % (ref 0–5.6)
HCT VFR BLD AUTO: 43.6 % (ref 35–47)
HGB BLD-MCNC: 14.3 G/DL (ref 11.7–15.7)
IMM GRANULOCYTES # BLD: 0 10E3/UL
IMM GRANULOCYTES NFR BLD: 0 %
LYMPHOCYTES # BLD AUTO: 1.4 10E3/UL (ref 0.8–5.3)
LYMPHOCYTES NFR BLD AUTO: 32 %
MCH RBC QN AUTO: 31 PG (ref 26.5–33)
MCHC RBC AUTO-ENTMCNC: 32.8 G/DL (ref 31.5–36.5)
MCV RBC AUTO: 94 FL (ref 78–100)
MONOCYTES # BLD AUTO: 0.3 10E3/UL (ref 0–1.3)
MONOCYTES NFR BLD AUTO: 8 %
NEUTROPHILS # BLD AUTO: 2.5 10E3/UL (ref 1.6–8.3)
NEUTROPHILS NFR BLD AUTO: 57 %
PLATELET # BLD AUTO: 231 10E3/UL (ref 150–450)
POTASSIUM SERPL-SCNC: 4.4 MMOL/L (ref 3.4–5.3)
PROT SERPL-MCNC: 7.1 G/DL (ref 6.4–8.3)
RBC # BLD AUTO: 4.62 10E6/UL (ref 3.8–5.2)
SODIUM SERPL-SCNC: 143 MMOL/L (ref 136–145)
WBC # BLD AUTO: 4.4 10E3/UL (ref 4–11)

## 2023-05-31 PROCEDURE — 83036 HEMOGLOBIN GLYCOSYLATED A1C: CPT | Performed by: INTERNAL MEDICINE

## 2023-05-31 PROCEDURE — 36415 COLL VENOUS BLD VENIPUNCTURE: CPT | Performed by: INTERNAL MEDICINE

## 2023-05-31 PROCEDURE — 85025 COMPLETE CBC W/AUTO DIFF WBC: CPT | Performed by: INTERNAL MEDICINE

## 2023-05-31 PROCEDURE — 80053 COMPREHEN METABOLIC PANEL: CPT | Performed by: INTERNAL MEDICINE

## 2023-05-31 NOTE — TELEPHONE ENCOUNTER
Central Prior Authorization Team   Phone: [phone]      PA Initiation    Medication: PIMECROLIMUS 1 % EX CREA  Insurance Company: [employer] ([facility]) - Phone [phone] Fax [phone]  Pharmacy Filling the Rx: [id number] - [place], [place] - [address]  Filling Pharmacy Phone: [phone]  Filling Pharmacy Fax:    Start Date: 5/31/2023

## 2023-06-01 NOTE — TELEPHONE ENCOUNTER
PRIOR AUTHORIZATION DENIED    Medication: PIMECROLIMUS 1 % EX CREA  Insurance Company: [employer] ([facility]) - Phone [phone] Fax [phone]  Denial Date: 6/1/2023  Denial Rational:           Appeal Information:             Patient Notified: No

## 2023-06-16 ENCOUNTER — OFFICE VISIT (OUTPATIENT)
Dept: DERMATOLOGY | Facility: CLINIC | Age: 73
End: 2023-06-16
Payer: COMMERCIAL

## 2023-06-16 DIAGNOSIS — L94.0 MORPHEA: Primary | ICD-10-CM

## 2023-06-16 DIAGNOSIS — D84.9 IMMUNOSUPPRESSION (H): ICD-10-CM

## 2023-06-16 DIAGNOSIS — M35.00 SJOGREN SYNDROME, UNSPECIFIED (H): ICD-10-CM

## 2023-06-16 PROCEDURE — 99214 OFFICE O/P EST MOD 30 MIN: CPT | Performed by: DERMATOLOGY

## 2023-06-16 ASSESSMENT — PAIN SCALES - GENERAL: PAINLEVEL: NO PAIN (0)

## 2023-06-16 NOTE — PROGRESS NOTES
[facility] Dermatology Note    Encounter Date: Jun 16, 2023    Dermatology Problem List:  1. 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 Sjogren syndrome (+RUSLAN 1:320, speckled, +Ro60 234, +SSA 4.6, weak +APLA 18.4)  - RF-, C3/C4 normal, dsDNA-, Ig normal, HCV-, SPEP normal  - prior: hydroxychloroquine (nausea, hair loss, dizziness)  - Salivary glands scan wnl  3. Burning dysesthesia on face  - gabapentin 100 mg daily  4. Anhidrosis   -  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 SHe thinks that is since childhood.  - Other autonomic reflex screen was minimally abnormal. Isolated finding of focal reduced sweat response to the proximal leg with normal cardiovagal and adrenergic reflexes, which was of uncertain significance.   - Brain MRI wnl.  5. Rosacea   - pimecrolimus PRN  - prior tx: doxycycline  6. Osteopenia  7. S/p Hysterectomy with bilateral salpingo-oophorectomy for endometriosis, age 36.  8. 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 Hx of vitamin D deficiency   10. Diminished thyroid uptake on lerft compared to rt on scan       ______________________________________    Impression/Plan:  1. Morphea: overall cutaneous symptoms are stable but had increased when tried tapering methotrexate below 10 mg weekly; now back to 12.5 mg weekly. We discussed cautious taper but will maintain for now. Having quite a bit of fatigue and mental fogginess which is likely multifactorial. Will taper gabapentin and stop to see if this is helpful. Potentially reduction in methotrexate dose will help, but defer for now given recent activity.  - methotrexate 12.5 mg weekly  - folic acid  - CBC, CMP q3 months  - taper gabapentin  - pimecrolimus cream  - rheumatology referral for concomitant Sjogren syndrome    Follow-up in 3-4 months.       Staff Involved:  Staff Only    Eris Araujo MD   of  Dermatology  Department of Dermatology  [facility]      CC:   Chief Complaint   Patient presents with     Derm Problem     Pily is here to follow-up on her linear morphea. She states that it seems to have gotten worse immediately, and then has remained the same.       History of Present Illness:  Ms. Pily Mejia is a 73 year old female who presents as a return patient.    Morphea - on methotrexate 12.5 mg weekly  - had more redness when tried to decrease from 10 mg weekly  - seems to fluctuate a bit day-to-day  - using pimecrolimus  - sunscreen over the top of the pimecrolimus causes itching and bumps    Fatigue has been a significant concern    On gabapentin 100 mg at bedtime - helpful to treat facial burning, but this was intermittent  - query exacerbation of fatigue symptoms/mental fogginess    Labs:  5/31/23 CBC, CMP unremarkable, A1c 5.7%    Physical exam:  Vitals: There were no vitals taken for this visit.  GEN: This is a well developed, well-nourished female in no acute distress, in a pleasant mood.    SKIN: Russell phototype II  - Focused examination of the face was performed.  - faint erythema on the left brow  - No other lesions of concern on areas examined.     Past Medical History:   No past medical history on file.  Past Surgical History:   Procedure Laterality Date     HYSTERECTOMY  1985     OOPHORECTOMY  1985       Social History:   reports that she has never smoked. She has never used smokeless tobacco.    Family History:  Family History   Problem Relation Age of Onset     Breast Cancer No family hx of        Medications:  Current Outpatient Medications   Medication Sig Dispense Refill     acetaminophen (TYLENOL) 500 MG tablet Take 1,000 mg by mouth       aspirin (ASA) 81 MG EC tablet Take 81 mg by mouth daily       folic acid (FOLVITE) 1 MG tablet Take 1 tablet (1 mg) by mouth daily 90 tablet 3     gabapentin (NEURONTIN) 100 MG capsule Take 100mg at night and  increase by 100mg at night until 300mg 90 capsule 3     methotrexate sodium 2.5 MG TABS Take 4 tablets (10 mg) by mouth once a week 52 tablet 3     pimecrolimus (ELIDEL) 1 % external cream Apply topically 2 times daily 60 g 4     Azelastine HCl 137 MCG/SPRAY SOLN INSTILL 1 TO 2 SPRAYS INTO EACH NOSTRIL TWICE DAILY (Patient not taking: Reported on 9/22/2022)  1     Calcium Carbonate Antacid 1177 MG CHEW As needed (Patient not taking: Reported on 9/22/2022)       diclofenac (VOLTAREN) 1 % topical gel  (Patient not taking: Reported on 9/22/2022)       fluticasone (FLONASE) 50 MCG/ACT nasal spray Spray 1 spray in nostril (Patient not taking: Reported on 9/22/2022)       lidocaine (LMX4) 4 % external cream Apply topically once as needed for mild pain (Patient not taking: Reported on 11/18/2022) 45 g 1     metroNIDAZOLE (METROCREAM) 0.75 % external cream Apply topically daily (Patient not taking: Reported on 2/23/2023) 45 g 11     mometasone (ELOCON) 0.1 % external cream PLEASE SEE ATTACHED FOR DETAILED DIRECTIONS (Patient not taking: Reported on 9/22/2022)  11     probiotic CAPS Take 1 capsule by mouth (Patient not taking: Reported on 11/18/2022)       Allergies   Allergen Reactions     Codeine      Fish Oil GI Disturbance     Hydroxychloroquine Other (See Comments) and GI Disturbance     Lac Bovis GI Disturbance     No Clinical Screening - See Comments Other (See Comments)     Triminycine per patient     Penicillins      Soybean Oil GI Disturbance

## 2023-06-16 NOTE — NURSING NOTE
Dermatology Rooming Note    Pily CASTRO Roberto's goals for this visit include:   Chief Complaint   Patient presents with     Derm Problem     Pily is here to follow-up on her linear morphea. She states that it seems to have gotten worse immediately, and then has remained the same.     Nuvia Luis, visit facilitator

## 2023-08-15 DIAGNOSIS — G62.9 NEUROPATHY: ICD-10-CM

## 2023-08-15 DIAGNOSIS — L94.1 LINEAR MORPHEA: ICD-10-CM

## 2023-08-15 DIAGNOSIS — L94.0 MORPHEA: ICD-10-CM

## 2023-08-17 RX ORDER — FOLIC ACID 1 MG/1
1 TABLET ORAL DAILY
Qty: 90 TABLET | Refills: 2 | Status: SHIPPED | OUTPATIENT
Start: 2023-08-17 | End: 2023-10-27

## 2023-08-17 RX ORDER — GABAPENTIN 100 MG/1
CAPSULE ORAL
Qty: 90 CAPSULE | Refills: 2 | Status: SHIPPED | OUTPATIENT
Start: 2023-08-17 | End: 2023-10-20

## 2023-08-17 RX ORDER — METHOTREXATE 2.5 MG/1
12.5 TABLET ORAL WEEKLY
Qty: 65 TABLET | Refills: 2 | Status: SHIPPED | OUTPATIENT
Start: 2023-08-17 | End: 2023-10-27

## 2023-08-21 ENCOUNTER — LAB (OUTPATIENT)
Dept: LAB | Facility: CLINIC | Age: 73
End: 2023-08-21
Payer: COMMERCIAL

## 2023-08-21 DIAGNOSIS — D84.9 IMMUNOSUPPRESSION (H): ICD-10-CM

## 2023-08-21 LAB
ALBUMIN SERPL BCG-MCNC: 4.5 G/DL (ref 3.5–5.2)
ALP SERPL-CCNC: 115 U/L (ref 35–104)
ALT SERPL W P-5'-P-CCNC: 35 U/L (ref 0–50)
ANION GAP SERPL CALCULATED.3IONS-SCNC: 8 MMOL/L (ref 7–15)
AST SERPL W P-5'-P-CCNC: 35 U/L (ref 0–45)
BASOPHILS # BLD AUTO: 0 10E3/UL (ref 0–0.2)
BASOPHILS NFR BLD AUTO: 1 %
BILIRUB SERPL-MCNC: 0.3 MG/DL
BUN SERPL-MCNC: 11 MG/DL (ref 8–23)
CALCIUM SERPL-MCNC: 9.3 MG/DL (ref 8.8–10.2)
CHLORIDE SERPL-SCNC: 107 MMOL/L (ref 98–107)
CREAT SERPL-MCNC: 0.87 MG/DL (ref 0.51–0.95)
DEPRECATED HCO3 PLAS-SCNC: 28 MMOL/L (ref 22–29)
EOSINOPHIL # BLD AUTO: 0.1 10E3/UL (ref 0–0.7)
EOSINOPHIL NFR BLD AUTO: 3 %
ERYTHROCYTE [DISTWIDTH] IN BLOOD BY AUTOMATED COUNT: 13.5 % (ref 10–15)
GFR SERPL CREATININE-BSD FRML MDRD: 70 ML/MIN/1.73M2
GLUCOSE SERPL-MCNC: 102 MG/DL (ref 70–99)
HCT VFR BLD AUTO: 42.3 % (ref 35–47)
HGB BLD-MCNC: 14.1 G/DL (ref 11.7–15.7)
IMM GRANULOCYTES # BLD: 0 10E3/UL
IMM GRANULOCYTES NFR BLD: 0 %
LYMPHOCYTES # BLD AUTO: 1.6 10E3/UL (ref 0.8–5.3)
LYMPHOCYTES NFR BLD AUTO: 35 %
MCH RBC QN AUTO: 31.8 PG (ref 26.5–33)
MCHC RBC AUTO-ENTMCNC: 33.3 G/DL (ref 31.5–36.5)
MCV RBC AUTO: 95 FL (ref 78–100)
MONOCYTES # BLD AUTO: 0.4 10E3/UL (ref 0–1.3)
MONOCYTES NFR BLD AUTO: 8 %
NEUTROPHILS # BLD AUTO: 2.4 10E3/UL (ref 1.6–8.3)
NEUTROPHILS NFR BLD AUTO: 53 %
PLATELET # BLD AUTO: 207 10E3/UL (ref 150–450)
POTASSIUM SERPL-SCNC: 4.7 MMOL/L (ref 3.4–5.3)
PROT SERPL-MCNC: 7 G/DL (ref 6.4–8.3)
RBC # BLD AUTO: 4.44 10E6/UL (ref 3.8–5.2)
SODIUM SERPL-SCNC: 143 MMOL/L (ref 136–145)
WBC # BLD AUTO: 4.4 10E3/UL (ref 4–11)

## 2023-08-21 PROCEDURE — 85025 COMPLETE CBC W/AUTO DIFF WBC: CPT

## 2023-08-21 PROCEDURE — 36415 COLL VENOUS BLD VENIPUNCTURE: CPT

## 2023-08-21 PROCEDURE — 80053 COMPREHEN METABOLIC PANEL: CPT

## 2023-10-20 DIAGNOSIS — G62.9 NEUROPATHY: ICD-10-CM

## 2023-10-20 NOTE — TELEPHONE ENCOUNTER
gabapentin (NEURONTIN) 100 MG capsule   Last Written Prescription Date:   8/17/2023  Last Fill Quantity: 90,   # refills: 2  Last Office Visit :  6/16/2023  Future Office visit:  10/27/2023    Routing refill request to provider for review/approval because:  Please send new updated order.  Last order was increased over time.  Now Pt taking 300 Mg's daily.     Samreen Yoder RN  Central Triage Red Flags/Med Refills

## 2023-10-23 RX ORDER — GABAPENTIN 100 MG/1
300 CAPSULE ORAL AT BEDTIME
Qty: 90 CAPSULE | Refills: 11 | Status: SHIPPED | OUTPATIENT
Start: 2023-10-23

## 2023-10-27 ENCOUNTER — OFFICE VISIT (OUTPATIENT)
Dept: DERMATOLOGY | Facility: CLINIC | Age: 73
End: 2023-10-27
Payer: COMMERCIAL

## 2023-10-27 DIAGNOSIS — L81.4 SOLAR LENTIGO: ICD-10-CM

## 2023-10-27 DIAGNOSIS — L94.1 LINEAR MORPHEA: Primary | ICD-10-CM

## 2023-10-27 DIAGNOSIS — L82.1 SEBORRHEIC KERATOSES: ICD-10-CM

## 2023-10-27 DIAGNOSIS — L71.9 ROSACEA: ICD-10-CM

## 2023-10-27 DIAGNOSIS — L30.8 ASTEATOTIC ECZEMA: ICD-10-CM

## 2023-10-27 PROCEDURE — 99214 OFFICE O/P EST MOD 30 MIN: CPT | Mod: GC | Performed by: DERMATOLOGY

## 2023-10-27 RX ORDER — METHOTREXATE 2.5 MG/1
TABLET ORAL
Qty: 65 TABLET | Refills: 2 | Status: SHIPPED | OUTPATIENT
Start: 2023-10-27 | End: 2024-02-08

## 2023-10-27 RX ORDER — FOLIC ACID 1 MG/1
1 TABLET ORAL DAILY
Qty: 90 TABLET | Refills: 2 | Status: SHIPPED | OUTPATIENT
Start: 2023-10-27 | End: 2024-02-08

## 2023-10-27 RX ORDER — PIMECROLIMUS 10 MG/G
CREAM TOPICAL 2 TIMES DAILY
Qty: 60 G | Refills: 4 | Status: SHIPPED | OUTPATIENT
Start: 2023-10-27

## 2023-10-27 RX ORDER — CETIRIZINE HYDROCHLORIDE 10 MG/1
5 TABLET ORAL
COMMUNITY

## 2023-10-27 ASSESSMENT — PAIN SCALES - GENERAL: PAINLEVEL: NO PAIN (0)

## 2023-10-27 NOTE — PATIENT INSTRUCTIONS
Gentle Skin Care Recommendations    Bathing   - limit showers to once daily, 15 minutes or less, with warm water   - use gentle soaps that are free of colors and scents and are not too strong of cleansers  - consider limiting soap to only the 'dirty' areas, like the armpits  and groin as much as possible to prevent stripping your skin in other areas of their natural moisture  - do not vigorously scrub when cleansing  - avoid any soaps with microbeads  - after showering, pat your skin gently dry with a towel  - make sure you are applying a good moisturizer after bathing every time (see below)  - do not take bubble baths as many of these products contain harsh chemicals that can irritate the skin    Examples of gentle cleansers:   - Mild bar soaps: Vanicream bar soap, Neutragena glycerin bar, Dove sensitive skin (fragrance-free)   - Mild liquid soaps: Vanicream liquid cleanser, Cerave liquid cleanser    Moisturizing     It is important to moisturize at least twice per day to the whole body. IMMEDIATELY after getting out of the shower, apply a moisturizer (preferably from the list below) to the entire body. If you have been prescribed any medications, apply those medications to the skin first and then you can apply the moisturizer on top.    Moisturizers come in a variety of types some of which are greasier, heavier, or lighter. The heaviest moisturizers are ointments, which are greasy like vaseline. The next best are creams, which are usually white and thick but absorb into the skin a little better. The lightest are lotions which are typically thin and contain more ingredients which can be irritating to your skin. For this reason, we do NOT recommend lotions.     Examples of good moisturizers:   - Ointments: vaseline, Cerave healing ointment, Vaniply, coconut oil   - Creams: Cerave, Vanicream, Neutrogena Norwegian Formula Hand Cream     Laundry     Be sure to use laundry products that are free of dyes and  "fragrances--many laundry products that claim to be fragrance-free actually are not free of these! Do not use laundry softeners or dryer sheets.     Good laundry products include:  - 7th generation Natural Laundry Detergent Liquid, 7th Generation Free and Clear Natural Laundry Detergent packs, 7th Generation Free and Clear natural 4x Concentrated Laundry Detergent, ECOS hypoallergenic laundry detergent, free & clear, ERA Ultra Era Free Liquid Laundry Detergent, All Free & Clear     Other Gentle Skin Recommendations    - Do not use products such as powders, perfumes, or colognes on your skin  - Avoid saunas and steam baths - these temperatures are too hot   - Avoid tight or  scratchy  clothing such as wool  - Always wash new clothing before wearing them for the first time  - Sometimes a humidifier or vaporizer, used at night can help the dry skin - but remember to keep it clean to void mold growth.  - Avoid applying essential oils to the skin or diffusing in the home (Many essential oils can be irritate to the skin!   - Keep in mind, just because something is \"natural\" it does not mean that it cannot irritate your skin (for example, poison ivy is natural, but will cause a painful rash!)    Could also consider over the counter urea cream to apply to the legs. Would not use aquaphor due to irritation from lanolin.           "

## 2023-10-27 NOTE — NURSING NOTE
Dermatology Rooming Note    Pily CASTRO Roberto's goals for this visit include:   Chief Complaint   Patient presents with    Derm Problem     Pily is here for a 4 month follow up for morphea. She states she hates taking the medicine and she reports brown spots and irritation to skin that has been diagnosed by someone else as eczema on legs.      Nuvia Luis, visit facilitator

## 2023-10-27 NOTE — PROGRESS NOTES
[facility] Dermatology Note  Encounter Date: Oct 27, 2023  Office Visit     Dermatology Problem List:  1. 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 Sjogren syndrome (+RUSLAN 1:320, speckled, +Ro60 234, +SSA 4.6, weak +APLA 18.4)  - RF-, C3/C4 normal, dsDNA-, Ig normal, HCV-, SPEP normal  - prior: hydroxychloroquine (nausea, hair loss, dizziness)  - Salivary glands scan wnl  3. Burning dysesthesia on face  - gabapentin 100 mg daily  4. Anhidrosis   -  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 SHe thinks that is since childhood.  - Other autonomic reflex screen was minimally abnormal. Isolated finding of focal reduced sweat response to the proximal leg with normal cardiovagal and adrenergic reflexes, which was of uncertain significance.   - Brain MRI wnl.  5. Rosacea   - pimecrolimus PRN  - prior tx: doxycycline  6. Osteopenia  7. S/p Hysterectomy with bilateral salpingo-oophorectomy for endometriosis, age 36.  8. 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 Hx of vitamin D deficiency   10. Diminished thyroid uptake on left compared to rt on scan    ____________________________________________    Assessment & Plan:   Linear morphea (en coup de sabre), forehead.  Overall stable on methotrexate 12.5 mg weekly and pimecrolimus topically.  However, she notes intermittent episodes of red discoloration on her forehead.  She and her  are interested in tapering the methotrexate, but are also concerned that tapering the methotrexate to 10 mg weekly, as attempted in the past, would lead to flaring of her symptoms.  Recent safety labs 8/21/2023 reveal mildly elevated alk phos to 115, AST, ALT, creatinine and CBC within normal limits. Reassured patient that the mild elevation in alk phos is not concerning.     Through shared decision making, patient and her  elected to decrease the  methotrexate to 10 mg weekly until follow up. She will let us know if she experiences a flare on the lower dose. We will continue to follow closely.   - decrease methotrexate from 12.5 mg weekly to 10 mg weekly  - continue folic acid 1 mg daily on non-methotrexate days   - Obtain CBC and CMP for monitoring ~mid-November   - Continue pimecrolimus 1% cream twice daily    2. Seborrheic keratoses and solar lentigines, cheeks and bilateral dorsal hands.   Reassured patient regarding the benign nature of these findings.  Patient endorses interest in treating the discoloration with laser. Will place referral to cosmetic dermatology.   - Sun protection: Counseled SPF30+ sunscreen, sun avoidance. Handout provided. Recommended mineral sunscreen.   - referral to cosmetic dermatology placed today     3. Asteatotic eczema, bilateral lower legs  Recommended diligent moisturization. In the future we could consider addition of topical calcineurin inhibitors or steroids.   - discussed gentle skin care  - could consider OTC urea cream daily    Procedures Performed:   - none    Follow-up: 3 month(s) in-person, or earlier for new or changing lesions    Staff: Dr. Gayle Ashford MD PGY-3  Dermatology Resident    Staff Physician Comments:   I saw and evaluated the patient with the resident and I edited the assessment and plan as documented in the note. I was present for the examination.    Eris Araujo MD   of Dermatology  Department of Dermatology  [facility] [employer]      ____________________________________________    CC: Derm Problem (Pily is here for a 4 month follow up for morphea. She states she hates taking the medicine and she reports brown spots and irritation to skin that has been diagnosed by someone else as eczema on legs. )    HPI:  Ms. Pily Mejia is a(n) 73 year old female who presents today as a return patient for follow-up of linear morphea.    Patient  "presents with her  today.  Together, they note that overall the morphea of the forehead seems to be fairly well controlled.  However, they note occasional \"redness\" that appears in the center of her forehead.  This typically lasts for a few days.  She has been applying Elidel cream to the face regularly.  She continues to take methotrexate 12.5 mg weekly with folic acid on the other days.  Notes fairly few side effects if taken regularly with food.    She also points out some \"brown spots\" on her cheeks and the backs of her hands that are quite concerning to her.  She wonders if they are secondary to taking methotrexate.    She is also concerned about her history of elevated ALT and recent elevation of alkaline phosphatase to 115 on recent CMP 8/21/2023.    Patient is otherwise feeling well, without additional skin concerns.    Labs Reviewed:    Labs 8/21/2023:    CMP with alk phos 115, AST 35, ALT 35, creatinine 0.7  CBC within normal limits    Physical Exam:  Vitals: There were no vitals taken for this visit.  SKIN: Focused examination of the face and bilateral dorsal hands was performed.  -Subtle atrophic skin colored plaque located on the central forehead through the glabella  - Few waxy, brown/tan-colored papules on the cheeks and bilateral dorsal hands  -Several brown, oval shaped macules and patches on the cheeks and bilateral dorsal hands  - dry, white scaly ichthyotic plaques with minimal to no red/pink discoloration on the bilateral lower legs  - No other lesions of concern on areas examined.     Medications:  Current Outpatient Medications   Medication    aspirin (ASA) 81 MG EC tablet    cetirizine (ZYRTEC) 10 MG tablet    cyanocobalamin (VITAMIN B-12) 2500 MCG SUBL sublingual tablet    folic acid (FOLVITE) 1 MG tablet    gabapentin (NEURONTIN) 100 MG capsule    methotrexate sodium 2.5 MG TABS    pimecrolimus (ELIDEL) 1 % external cream    acetaminophen (TYLENOL) 500 MG tablet    Azelastine HCl 137 " MCG/SPRAY SOLN    Calcium Carbonate Antacid 1177 MG CHEW    diclofenac (VOLTAREN) 1 % topical gel    fluticasone (FLONASE) 50 MCG/ACT nasal spray    lidocaine (LMX4) 4 % external cream    metroNIDAZOLE (METROCREAM) 0.75 % external cream    mometasone (ELOCON) 0.1 % external cream    probiotic CAPS     No current facility-administered medications for this visit.      Past Medical History:   Patient Active Problem List   Diagnosis    Osteopenia    Postsurgical Acquired Absence Of Genitalia     No past medical history on file.    CC No referring provider defined for this encounter. on close of this encounter.

## 2023-10-27 NOTE — LETTER
10/27/2023       RE: Pily Mejia  [address] [address]     Dear Colleague,    Thank you for referring your patient, Pily Mejia, to the [facility] [place] at [facility]. Please see a copy of my visit note below.    [facility] Dermatology Note  Encounter Date: Oct 27, 2023  Office Visit     Dermatology Problem List:  1. 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 Sjogren syndrome (+RUSLAN 1:320, speckled, +Ro60 234, +SSA 4.6, weak +APLA 18.4)  - RF-, C3/C4 normal, dsDNA-, Ig normal, HCV-, SPEP normal  - prior: hydroxychloroquine (nausea, hair loss, dizziness)  - Salivary glands scan wnl  3. Burning dysesthesia on face  - gabapentin 100 mg daily  4. Anhidrosis   -  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 SHe thinks that is since childhood.  - Other autonomic reflex screen was minimally abnormal. Isolated finding of focal reduced sweat response to the proximal leg with normal cardiovagal and adrenergic reflexes, which was of uncertain significance.   - Brain MRI wnl.  5. Rosacea   - pimecrolimus PRN  - prior tx: doxycycline  6. Osteopenia  7. S/p Hysterectomy with bilateral salpingo-oophorectomy for endometriosis, age 36.  8. 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 Hx of vitamin D deficiency   10. Diminished thyroid uptake on left compared to rt on scan    ____________________________________________    Assessment & Plan:   Linear morphea (en coup de sabre), forehead.  Overall stable on methotrexate 12.5 mg weekly and pimecrolimus topically.  However, she notes intermittent episodes of red discoloration on her forehead.  She and her  are interested in tapering the methotrexate, but are also concerned that tapering the methotrexate to 10 mg weekly, as attempted in the  past, would lead to flaring of her symptoms.  Recent safety labs 8/21/2023 reveal mildly elevated alk phos to 115, AST, ALT, creatinine and CBC within normal limits. Reassured patient that the mild elevation in alk phos is not concerning.     Through shared decision making, patient and her  elected to decrease the methotrexate to 10 mg weekly until follow up. She will let us know if she experiences a flare on the lower dose. We will continue to follow closely.   - decrease methotrexate from 12.5 mg weekly to 10 mg weekly  - continue folic acid 1 mg daily on non-methotrexate days   - Obtain CBC and CMP for monitoring ~mid-November   - Continue pimecrolimus 1% cream twice daily    2. Seborrheic keratoses and solar lentigines, cheeks and bilateral dorsal hands.   Reassured patient regarding the benign nature of these findings.  Patient endorses interest in treating the discoloration with laser. Will place referral to cosmetic dermatology.   - Sun protection: Counseled SPF30+ sunscreen, sun avoidance. Handout provided. Recommended mineral sunscreen.   - referral to cosmetic dermatology placed today     3. Asteatotic eczema, bilateral lower legs  Recommended diligent moisturization. In the future we could consider addition of topical calcineurin inhibitors or steroids.   - discussed gentle skin care  - could consider OTC urea cream daily    Procedures Performed:   - none    Follow-up: 3 month(s) in-person, or earlier for new or changing lesions    Staff: Dr. Gayle Ashford MD PGY-3  Dermatology Resident    Staff Physician Comments:   I saw and evaluated the patient with the resident and I edited the assessment and plan as documented in the note. I was present for the examination.    Eris Araujo MD   of Dermatology  Department of Dermatology  [facility] [employer] [facility]      ____________________________________________    CC: Derm Problem (Pily is here for a  "4 month follow up for morphea. She states she hates taking the medicine and she reports brown spots and irritation to skin that has been diagnosed by someone else as eczema on legs. )    HPI:  Ms. Pily Mejia is a(n) 73 year old female who presents today as a return patient for follow-up of linear morphea.    Patient presents with her  today.  Together, they note that overall the morphea of the forehead seems to be fairly well controlled.  However, they note occasional \"redness\" that appears in the center of her forehead.  This typically lasts for a few days.  She has been applying Elidel cream to the face regularly.  She continues to take methotrexate 12.5 mg weekly with folic acid on the other days.  Notes fairly few side effects if taken regularly with food.    She also points out some \"brown spots\" on her cheeks and the backs of her hands that are quite concerning to her.  She wonders if they are secondary to taking methotrexate.    She is also concerned about her history of elevated ALT and recent elevation of alkaline phosphatase to 115 on recent CMP 8/21/2023.    Patient is otherwise feeling well, without additional skin concerns.    Labs Reviewed:    Labs 8/21/2023:    CMP with alk phos 115, AST 35, ALT 35, creatinine 0.7  CBC within normal limits    Physical Exam:  Vitals: There were no vitals taken for this visit.  SKIN: Focused examination of the face and bilateral dorsal hands was performed.  -Subtle atrophic skin colored plaque located on the central forehead through the glabella  - Few waxy, brown/tan-colored papules on the cheeks and bilateral dorsal hands  -Several brown, oval shaped macules and patches on the cheeks and bilateral dorsal hands  - dry, white scaly ichthyotic plaques with minimal to no red/pink discoloration on the bilateral lower legs  - No other lesions of concern on areas examined.     Medications:  Current Outpatient Medications   Medication    aspirin (ASA) 81 MG EC " tablet    cetirizine (ZYRTEC) 10 MG tablet    cyanocobalamin (VITAMIN B-12) 2500 MCG SUBL sublingual tablet    folic acid (FOLVITE) 1 MG tablet    gabapentin (NEURONTIN) 100 MG capsule    methotrexate sodium 2.5 MG TABS    pimecrolimus (ELIDEL) 1 % external cream    acetaminophen (TYLENOL) 500 MG tablet    Azelastine HCl 137 MCG/SPRAY SOLN    Calcium Carbonate Antacid 1177 MG CHEW    diclofenac (VOLTAREN) 1 % topical gel    fluticasone (FLONASE) 50 MCG/ACT nasal spray    lidocaine (LMX4) 4 % external cream    metroNIDAZOLE (METROCREAM) 0.75 % external cream    mometasone (ELOCON) 0.1 % external cream    probiotic CAPS     No current facility-administered medications for this visit.      Past Medical History:   Patient Active Problem List   Diagnosis    Osteopenia    Postsurgical Acquired Absence Of Genitalia     No past medical history on file.    CC No referring provider defined for this encounter. on close of this encounter.

## 2023-10-29 ENCOUNTER — HEALTH MAINTENANCE LETTER (OUTPATIENT)
Age: 73
End: 2023-10-29

## 2023-11-08 ENCOUNTER — LAB (OUTPATIENT)
Dept: LAB | Facility: CLINIC | Age: 73
End: 2023-11-08
Payer: COMMERCIAL

## 2023-11-08 DIAGNOSIS — L94.1 LINEAR MORPHEA: ICD-10-CM

## 2023-11-08 DIAGNOSIS — D84.9 IMMUNOSUPPRESSION (H): ICD-10-CM

## 2023-11-08 LAB
ALBUMIN SERPL BCG-MCNC: 4.2 G/DL (ref 3.5–5.2)
ALP SERPL-CCNC: 114 U/L (ref 35–104)
ALT SERPL W P-5'-P-CCNC: 157 U/L (ref 0–50)
ANION GAP SERPL CALCULATED.3IONS-SCNC: 9 MMOL/L (ref 7–15)
AST SERPL W P-5'-P-CCNC: 74 U/L (ref 0–45)
BASOPHILS # BLD AUTO: 0 10E3/UL (ref 0–0.2)
BASOPHILS NFR BLD AUTO: 0 %
BILIRUB SERPL-MCNC: 0.3 MG/DL
BUN SERPL-MCNC: 11.7 MG/DL (ref 8–23)
CALCIUM SERPL-MCNC: 9.3 MG/DL (ref 8.8–10.2)
CHLORIDE SERPL-SCNC: 104 MMOL/L (ref 98–107)
CREAT SERPL-MCNC: 0.93 MG/DL (ref 0.51–0.95)
DEPRECATED HCO3 PLAS-SCNC: 29 MMOL/L (ref 22–29)
EGFRCR SERPLBLD CKD-EPI 2021: 65 ML/MIN/1.73M2
EOSINOPHIL # BLD AUTO: 0.1 10E3/UL (ref 0–0.7)
EOSINOPHIL NFR BLD AUTO: 3 %
ERYTHROCYTE [DISTWIDTH] IN BLOOD BY AUTOMATED COUNT: 13.6 % (ref 10–15)
GLUCOSE SERPL-MCNC: 103 MG/DL (ref 70–99)
HCT VFR BLD AUTO: 42.5 % (ref 35–47)
HGB BLD-MCNC: 14.1 G/DL (ref 11.7–15.7)
IMM GRANULOCYTES # BLD: 0 10E3/UL
IMM GRANULOCYTES NFR BLD: 0 %
LYMPHOCYTES # BLD AUTO: 1.4 10E3/UL (ref 0.8–5.3)
LYMPHOCYTES NFR BLD AUTO: 28 %
MCH RBC QN AUTO: 32.1 PG (ref 26.5–33)
MCHC RBC AUTO-ENTMCNC: 33.2 G/DL (ref 31.5–36.5)
MCV RBC AUTO: 97 FL (ref 78–100)
MONOCYTES # BLD AUTO: 0.4 10E3/UL (ref 0–1.3)
MONOCYTES NFR BLD AUTO: 7 %
NEUTROPHILS # BLD AUTO: 3 10E3/UL (ref 1.6–8.3)
NEUTROPHILS NFR BLD AUTO: 61 %
PLATELET # BLD AUTO: 227 10E3/UL (ref 150–450)
POTASSIUM SERPL-SCNC: 4.2 MMOL/L (ref 3.4–5.3)
PROT SERPL-MCNC: 6.9 G/DL (ref 6.4–8.3)
RBC # BLD AUTO: 4.39 10E6/UL (ref 3.8–5.2)
SODIUM SERPL-SCNC: 142 MMOL/L (ref 135–145)
WBC # BLD AUTO: 4.9 10E3/UL (ref 4–11)

## 2023-11-08 PROCEDURE — 85025 COMPLETE CBC W/AUTO DIFF WBC: CPT

## 2023-11-08 PROCEDURE — 80053 COMPREHEN METABOLIC PANEL: CPT

## 2023-11-08 PROCEDURE — 36415 COLL VENOUS BLD VENIPUNCTURE: CPT

## 2023-11-30 ENCOUNTER — LAB (OUTPATIENT)
Dept: LAB | Facility: CLINIC | Age: 73
End: 2023-11-30
Payer: COMMERCIAL

## 2023-11-30 DIAGNOSIS — D84.9 IMMUNOSUPPRESSION (H): ICD-10-CM

## 2023-11-30 LAB
ALBUMIN SERPL BCG-MCNC: 4 G/DL (ref 3.5–5.2)
ALP SERPL-CCNC: 113 U/L (ref 40–150)
ALT SERPL W P-5'-P-CCNC: 31 U/L (ref 0–50)
ANION GAP SERPL CALCULATED.3IONS-SCNC: 10 MMOL/L (ref 7–15)
AST SERPL W P-5'-P-CCNC: 33 U/L (ref 0–45)
BASOPHILS # BLD AUTO: 0 10E3/UL (ref 0–0.2)
BASOPHILS NFR BLD AUTO: 0 %
BILIRUB SERPL-MCNC: 0.3 MG/DL
BUN SERPL-MCNC: 13.5 MG/DL (ref 8–23)
CALCIUM SERPL-MCNC: 9.2 MG/DL (ref 8.8–10.2)
CHLORIDE SERPL-SCNC: 104 MMOL/L (ref 98–107)
CREAT SERPL-MCNC: 0.9 MG/DL (ref 0.51–0.95)
DEPRECATED HCO3 PLAS-SCNC: 25 MMOL/L (ref 22–29)
EGFRCR SERPLBLD CKD-EPI 2021: 67 ML/MIN/1.73M2
EOSINOPHIL # BLD AUTO: 0.1 10E3/UL (ref 0–0.7)
EOSINOPHIL NFR BLD AUTO: 2 %
ERYTHROCYTE [DISTWIDTH] IN BLOOD BY AUTOMATED COUNT: 13 % (ref 10–15)
GLUCOSE SERPL-MCNC: 100 MG/DL (ref 70–99)
HCT VFR BLD AUTO: 40.6 % (ref 35–47)
HGB BLD-MCNC: 13.9 G/DL (ref 11.7–15.7)
IMM GRANULOCYTES # BLD: 0 10E3/UL
IMM GRANULOCYTES NFR BLD: 0 %
LYMPHOCYTES # BLD AUTO: 1.4 10E3/UL (ref 0.8–5.3)
LYMPHOCYTES NFR BLD AUTO: 30 %
MCH RBC QN AUTO: 32.4 PG (ref 26.5–33)
MCHC RBC AUTO-ENTMCNC: 34.2 G/DL (ref 31.5–36.5)
MCV RBC AUTO: 95 FL (ref 78–100)
MONOCYTES # BLD AUTO: 0.4 10E3/UL (ref 0–1.3)
MONOCYTES NFR BLD AUTO: 8 %
NEUTROPHILS # BLD AUTO: 2.8 10E3/UL (ref 1.6–8.3)
NEUTROPHILS NFR BLD AUTO: 60 %
PLATELET # BLD AUTO: 228 10E3/UL (ref 150–450)
POTASSIUM SERPL-SCNC: 4.1 MMOL/L (ref 3.4–5.3)
PROT SERPL-MCNC: 6.5 G/DL (ref 6.4–8.3)
RBC # BLD AUTO: 4.29 10E6/UL (ref 3.8–5.2)
SODIUM SERPL-SCNC: 139 MMOL/L (ref 135–145)
WBC # BLD AUTO: 4.8 10E3/UL (ref 4–11)

## 2023-11-30 PROCEDURE — 80053 COMPREHEN METABOLIC PANEL: CPT

## 2023-11-30 PROCEDURE — 85025 COMPLETE CBC W/AUTO DIFF WBC: CPT

## 2023-11-30 PROCEDURE — 36415 COLL VENOUS BLD VENIPUNCTURE: CPT

## 2023-12-03 NOTE — CONFIDENTIAL NOTE
NOTES Status Details   OFFICE NOTE from referring provider Internal 06.16.2023 Eris Araujo MD    OFFICE NOTE from other specialist     DISCHARGE SUMMARY from hospital     DISCHARGE REPORT from the ER     MEDICATION LIST Internal    LABS (Any and all labs)      Internal    Biopsy/pathology (Anything related to diagnoses I.e. fluid aspirations, lip biopsy, muscle biopsy)               Imaging (All imaging related to diagnoses)     Echo Internal 07.11.2022   HRCT     CXR     EMG                    Scleroderma/Dermatomyositis diagnoses     Previous Cardiology notes      Previous Pulmonary notes     Previous Dermatology notes     Previous GI notes     Lupus diagnoses     Previous Nephrology notes     Previous Dermatology notes     Previous Cardiology notes

## 2024-01-30 ENCOUNTER — LAB (OUTPATIENT)
Dept: LAB | Facility: CLINIC | Age: 74
End: 2024-01-30
Payer: COMMERCIAL

## 2024-01-30 DIAGNOSIS — D84.9 IMMUNOSUPPRESSION (H): ICD-10-CM

## 2024-01-30 LAB
BASOPHILS # BLD AUTO: 0 10E3/UL (ref 0–0.2)
BASOPHILS NFR BLD AUTO: 0 %
EOSINOPHIL # BLD AUTO: 0.1 10E3/UL (ref 0–0.7)
EOSINOPHIL NFR BLD AUTO: 2 %
ERYTHROCYTE [DISTWIDTH] IN BLOOD BY AUTOMATED COUNT: 12.6 % (ref 10–15)
HCT VFR BLD AUTO: 44.2 % (ref 35–47)
HGB BLD-MCNC: 14.8 G/DL (ref 11.7–15.7)
IMM GRANULOCYTES # BLD: 0 10E3/UL
IMM GRANULOCYTES NFR BLD: 0 %
LYMPHOCYTES # BLD AUTO: 1.6 10E3/UL (ref 0.8–5.3)
LYMPHOCYTES NFR BLD AUTO: 34 %
MCH RBC QN AUTO: 31.2 PG (ref 26.5–33)
MCHC RBC AUTO-ENTMCNC: 33.5 G/DL (ref 31.5–36.5)
MCV RBC AUTO: 93 FL (ref 78–100)
MONOCYTES # BLD AUTO: 0.3 10E3/UL (ref 0–1.3)
MONOCYTES NFR BLD AUTO: 7 %
NEUTROPHILS # BLD AUTO: 2.6 10E3/UL (ref 1.6–8.3)
NEUTROPHILS NFR BLD AUTO: 57 %
PLATELET # BLD AUTO: 263 10E3/UL (ref 150–450)
RBC # BLD AUTO: 4.75 10E6/UL (ref 3.8–5.2)
WBC # BLD AUTO: 4.6 10E3/UL (ref 4–11)

## 2024-01-30 PROCEDURE — 85025 COMPLETE CBC W/AUTO DIFF WBC: CPT

## 2024-01-30 PROCEDURE — 36415 COLL VENOUS BLD VENIPUNCTURE: CPT

## 2024-01-30 PROCEDURE — 80053 COMPREHEN METABOLIC PANEL: CPT

## 2024-01-31 LAB
ALBUMIN SERPL BCG-MCNC: 4.3 G/DL (ref 3.5–5.2)
ALP SERPL-CCNC: 129 U/L (ref 40–150)
ALT SERPL W P-5'-P-CCNC: 40 U/L (ref 0–50)
ANION GAP SERPL CALCULATED.3IONS-SCNC: 8 MMOL/L (ref 7–15)
AST SERPL W P-5'-P-CCNC: 34 U/L (ref 0–45)
BILIRUB SERPL-MCNC: 0.3 MG/DL
BUN SERPL-MCNC: 9.4 MG/DL (ref 8–23)
CALCIUM SERPL-MCNC: 9.3 MG/DL (ref 8.8–10.2)
CHLORIDE SERPL-SCNC: 106 MMOL/L (ref 98–107)
CREAT SERPL-MCNC: 0.87 MG/DL (ref 0.51–0.95)
DEPRECATED HCO3 PLAS-SCNC: 27 MMOL/L (ref 22–29)
EGFRCR SERPLBLD CKD-EPI 2021: 70 ML/MIN/1.73M2
GLUCOSE SERPL-MCNC: 93 MG/DL (ref 70–99)
POTASSIUM SERPL-SCNC: 4.3 MMOL/L (ref 3.4–5.3)
PROT SERPL-MCNC: 6.9 G/DL (ref 6.4–8.3)
SODIUM SERPL-SCNC: 141 MMOL/L (ref 135–145)

## 2024-02-01 ENCOUNTER — OFFICE VISIT (OUTPATIENT)
Dept: DERMATOLOGY | Facility: CLINIC | Age: 74
End: 2024-02-01
Attending: DERMATOLOGY
Payer: COMMERCIAL

## 2024-02-01 DIAGNOSIS — L94.1 LINEAR MORPHEA: Primary | ICD-10-CM

## 2024-02-01 DIAGNOSIS — D84.9 IMMUNOSUPPRESSION (H): ICD-10-CM

## 2024-02-01 PROCEDURE — 99214 OFFICE O/P EST MOD 30 MIN: CPT | Performed by: DERMATOLOGY

## 2024-02-01 ASSESSMENT — PAIN SCALES - GENERAL: PAINLEVEL: NO PAIN (0)

## 2024-02-01 NOTE — NURSING NOTE
Dermatology Rooming Note    Pily Mejia's goals for this visit include:   Chief Complaint   Patient presents with    Derm Problem     Morphbob - Pily states she has been worsening on her forehead.     Oswald HICKS, GIBSON

## 2024-02-01 NOTE — LETTER
2/1/2024       RE: Pily Mejia  [address] [address]     Dear Colleague,    Thank you for referring your patient, Pily Mejia, to the [facility] [place] at [facility]. Please see a copy of my visit note below.    [facility] Dermatology Note  Encounter Date: Feb 1, 2024  Office Visit     Dermatology Problem List:  1. 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 Sjogren syndrome (+RUSLAN 1:320, speckled, +Ro60 234, +SSA 4.6, weak +APLA 18.4)  - RF-, C3/C4 normal, dsDNA-, Ig normal, HCV-, SPEP normal  - prior: hydroxychloroquine (nausea, hair loss, dizziness)  - Salivary glands scan wnl  3. Burning dysesthesia on face  - gabapentin 100 mg daily  4. Anhidrosis   -  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 SHe thinks that is since childhood.  - Other autonomic reflex screen was minimally abnormal. Isolated finding of focal reduced sweat response to the proximal leg with normal cardiovagal and adrenergic reflexes, which was of uncertain significance.   - Brain MRI wnl.  5. Rosacea   - pimecrolimus PRN  - prior tx: doxycycline  6. Osteopenia  7. S/p Hysterectomy with bilateral salpingo-oophorectomy for endometriosis, age 36.  8. 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 Hx of vitamin D deficiency   10. Diminished thyroid uptake on left compared to rt on scan    ____________________________________________    Assessment & Plan:   Linear morphea (en coup de sabre), forehead. Off systemic treatment due to elevated liver enzymes with methotrexate. Given ongoing/worsening systemic symptoms, we discussed next steps including restarting low-dose methotrexate with frequent lab monitoring, mycophenolate mofetil, azathioprine, hydroxychloroquine, and no systemic treatment. Patient will think  about options and plan around upcoming international travel  - Start tacrolimus ointment      Procedures Performed:   - none    Follow-up: 3 month(s) in-person, or earlier for new or changing lesions    Staff:     Scribe Disclosure:   By signing my name below, I, Macrina Servin, attest that this documentation has been prepared under the direction and in the presence of Eris Araujo MD.  - Electronically Signed: Macrina Servin 02/01/24     Provider Disclosure:   The documentation recorded by the scribe accurately reflects the services I personally performed and the decisions made by me.    Eris Araujo MD   of Dermatology  Department of Dermatology  [facility] [employer] [facility]          ____________________________________________    CC: Derm Problem (Morphea - Pily states she has been worsening on her forehead.)    HPI:  Ms. Pily Mejia is a(n) 73 year old female who presents today as a return patient for follow-up of linear morphea.    Morphea - when came off methotrexate for elevated liver enzymes, forehead lesion became a bit more red but no change in size  - nose and cheek more dry  - on forehead skin feels soft but maybe getting thicker like leather  - more fatigued  - traveling in [place] - walking 10-15k steps per day  - more pain on R side - radiating down leg - using topical Voltaren and Tylenol  - tore tendon in gluteus denilson, injured tendon in gluteus medius about 10 years ago  - morning stiffness  - some azathioprine    Patient is otherwise feeling well, without additional skin concerns.    Labs Reviewed:  01/2024:  CBC-WNL  CMP-WNL    Physical exam:  Vitals: There were no vitals taken for this visit.  GEN: This is a well developed, well-nourished female in no acute distress, in a pleasant mood.    SKIN: Russell phototype II  - Focused examination of the face was performed.  - subtle depression and erythema on the central face, stable  - No other lesions of  concern on areas examined.       Medications:  Current Outpatient Medications   Medication    aspirin (ASA) 81 MG EC tablet    cetirizine (ZYRTEC) 10 MG tablet    cyanocobalamin (VITAMIN B-12) 2500 MCG SUBL sublingual tablet    gabapentin (NEURONTIN) 100 MG capsule    pimecrolimus (ELIDEL) 1 % external cream    acetaminophen (TYLENOL) 500 MG tablet    Azelastine HCl 137 MCG/SPRAY SOLN    Calcium Carbonate Antacid 1177 MG CHEW    diclofenac (VOLTAREN) 1 % topical gel    fluticasone (FLONASE) 50 MCG/ACT nasal spray    folic acid (FOLVITE) 1 MG tablet    lidocaine (LMX4) 4 % external cream    methotrexate sodium 2.5 MG TABS    metroNIDAZOLE (METROCREAM) 0.75 % external cream    mometasone (ELOCON) 0.1 % external cream    probiotic CAPS     No current facility-administered medications for this visit.      Past Medical History:   Patient Active Problem List   Diagnosis    Osteopenia    Postsurgical Acquired Absence Of Genitalia     No past medical history on file.    CC No referring provider defined for this encounter. on close of this encounter.

## 2024-02-01 NOTE — PROGRESS NOTES
[facility] Dermatology Note  Encounter Date: Feb 1, 2024  Office Visit     Dermatology Problem List:  1. 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 Sjogren syndrome (+RUSLAN 1:320, speckled, +Ro60 234, +SSA 4.6, weak +APLA 18.4)  - RF-, C3/C4 normal, dsDNA-, Ig normal, HCV-, SPEP normal  - prior: hydroxychloroquine (nausea, hair loss, dizziness)  - Salivary glands scan wnl  3. Burning dysesthesia on face  - gabapentin 100 mg daily  4. Anhidrosis   -  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 SHe thinks that is since childhood.  - Other autonomic reflex screen was minimally abnormal. Isolated finding of focal reduced sweat response to the proximal leg with normal cardiovagal and adrenergic reflexes, which was of uncertain significance.   - Brain MRI wnl.  5. Rosacea   - pimecrolimus PRN  - prior tx: doxycycline  6. Osteopenia  7. S/p Hysterectomy with bilateral salpingo-oophorectomy for endometriosis, age 36.  8. 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 Hx of vitamin D deficiency   10. Diminished thyroid uptake on left compared to rt on scan    ____________________________________________    Assessment & Plan:   Linear morphea (en coup de sabre), forehead. Off systemic treatment due to elevated liver enzymes with methotrexate. Given ongoing/worsening systemic symptoms, we discussed next steps including restarting low-dose methotrexate with frequent lab monitoring, mycophenolate mofetil, azathioprine, hydroxychloroquine, and no systemic treatment. Patient will think about options and plan around upcoming international travel  - Start tacrolimus ointment      Procedures Performed:   - none    Follow-up: 3 month(s) in-person, or earlier for new or changing lesions    Staff:     Scribe Disclosure:   By signing my name below, I, Macrina Servin, attest that this documentation has been  prepared under the direction and in the presence of Eris Araujo MD.  - Electronically Signed: Macrina Servin 02/01/24     Provider Disclosure:   The documentation recorded by the scribe accurately reflects the services I personally performed and the decisions made by me.    Eris Araujo MD   of Dermatology  Department of Dermatology  [facility]          ____________________________________________    CC: Derm Problem (Morphea - Pily states she has been worsening on her forehead.)    HPI:  Ms. Pily Mejia is a(n) 73 year old female who presents today as a return patient for follow-up of linear morphea.    Morphea - when came off methotrexate for elevated liver enzymes, forehead lesion became a bit more red but no change in size  - nose and cheek more dry  - on forehead skin feels soft but maybe getting thicker like leather  - more fatigued  - traveling in [place] - walking 10-15k steps per day  - more pain on R side - radiating down leg - using topical Voltaren and Tylenol  - tore tendon in gluteus denilson, injured tendon in gluteus medius about 10 years ago  - morning stiffness  - some azathioprine    Patient is otherwise feeling well, without additional skin concerns.    Labs Reviewed:  01/2024:  CBC-WNL  CMP-WNL    Physical exam:  Vitals: There were no vitals taken for this visit.  GEN: This is a well developed, well-nourished female in no acute distress, in a pleasant mood.    SKIN: Russell phototype II  - Focused examination of the face was performed.  - subtle depression and erythema on the central face, stable  - No other lesions of concern on areas examined.       Medications:  Current Outpatient Medications   Medication    aspirin (ASA) 81 MG EC tablet    cetirizine (ZYRTEC) 10 MG tablet    cyanocobalamin (VITAMIN B-12) 2500 MCG SUBL sublingual tablet    gabapentin (NEURONTIN) 100 MG capsule    pimecrolimus (ELIDEL) 1 % external cream     acetaminophen (TYLENOL) 500 MG tablet    Azelastine HCl 137 MCG/SPRAY SOLN    Calcium Carbonate Antacid 1177 MG CHEW    diclofenac (VOLTAREN) 1 % topical gel    fluticasone (FLONASE) 50 MCG/ACT nasal spray    folic acid (FOLVITE) 1 MG tablet    lidocaine (LMX4) 4 % external cream    methotrexate sodium 2.5 MG TABS    metroNIDAZOLE (METROCREAM) 0.75 % external cream    mometasone (ELOCON) 0.1 % external cream    probiotic CAPS     No current facility-administered medications for this visit.      Past Medical History:   Patient Active Problem List   Diagnosis    Osteopenia    Postsurgical Acquired Absence Of Genitalia     No past medical history on file.    CC No referring provider defined for this encounter. on close of this encounter.

## 2024-02-06 ENCOUNTER — TELEPHONE (OUTPATIENT)
Dept: DERMATOLOGY | Facility: CLINIC | Age: 74
End: 2024-02-06
Payer: COMMERCIAL

## 2024-02-06 NOTE — TELEPHONE ENCOUNTER
If you would like to schedule a follow up appt with the ddermatology clinic, please call us at [phone].

## 2024-02-08 ENCOUNTER — LAB (OUTPATIENT)
Dept: LAB | Facility: CLINIC | Age: 74
End: 2024-02-08
Payer: COMMERCIAL

## 2024-02-08 ENCOUNTER — OFFICE VISIT (OUTPATIENT)
Dept: RHEUMATOLOGY | Facility: CLINIC | Age: 74
End: 2024-02-08
Attending: DERMATOLOGY
Payer: COMMERCIAL

## 2024-02-08 ENCOUNTER — PRE VISIT (OUTPATIENT)
Dept: RHEUMATOLOGY | Facility: CLINIC | Age: 74
End: 2024-02-08
Payer: COMMERCIAL

## 2024-02-08 VITALS
SYSTOLIC BLOOD PRESSURE: 127 MMHG | DIASTOLIC BLOOD PRESSURE: 78 MMHG | HEART RATE: 67 BPM | HEIGHT: 63 IN | BODY MASS INDEX: 26.59 KG/M2 | WEIGHT: 150.1 LBS

## 2024-02-08 DIAGNOSIS — M35.00 SJOGREN SYNDROME, UNSPECIFIED (H): ICD-10-CM

## 2024-02-08 DIAGNOSIS — M35.00 SJOGREN'S SYNDROME (H): Primary | ICD-10-CM

## 2024-02-08 DIAGNOSIS — M70.61 TROCHANTERIC BURSITIS, RIGHT HIP: ICD-10-CM

## 2024-02-08 DIAGNOSIS — J98.4 OTHER DISORDERS OF LUNG: ICD-10-CM

## 2024-02-08 DIAGNOSIS — M35.00 PRIMARY SJOGREN'S SYNDROME (H): ICD-10-CM

## 2024-02-08 DIAGNOSIS — I89.9 LYMPH NODE DISORDER: ICD-10-CM

## 2024-02-08 DIAGNOSIS — E04.1 THYROID CYST: Primary | ICD-10-CM

## 2024-02-08 DIAGNOSIS — G47.419 PRIMARY NARCOLEPSY WITHOUT CATAPLEXY: ICD-10-CM

## 2024-02-08 LAB
ALBUMIN UR-MCNC: NEGATIVE MG/DL
APPEARANCE UR: CLEAR
BACTERIA #/AREA URNS HPF: ABNORMAL /HPF
BILIRUB UR QL STRIP: NEGATIVE
COLOR UR AUTO: YELLOW
ERYTHROCYTE [SEDIMENTATION RATE] IN BLOOD BY WESTERGREN METHOD: 8 MM/HR (ref 0–30)
GLUCOSE UR STRIP-MCNC: NEGATIVE MG/DL
HGB UR QL STRIP: NEGATIVE
KETONES UR STRIP-MCNC: NEGATIVE MG/DL
LEUKOCYTE ESTERASE UR QL STRIP: NEGATIVE
NITRATE UR QL: NEGATIVE
PH UR STRIP: 7 [PH] (ref 5–7)
RBC #/AREA URNS AUTO: ABNORMAL /HPF
SP GR UR STRIP: 1.01 (ref 1–1.03)
SQUAMOUS #/AREA URNS AUTO: ABNORMAL /LPF
UROBILINOGEN UR STRIP-ACNC: 0.2 E.U./DL
WBC #/AREA URNS AUTO: ABNORMAL /HPF

## 2024-02-08 PROCEDURE — 83516 IMMUNOASSAY NONANTIBODY: CPT | Mod: 90

## 2024-02-08 PROCEDURE — 86146 BETA-2 GLYCOPROTEIN ANTIBODY: CPT

## 2024-02-08 PROCEDURE — 86225 DNA ANTIBODY NATIVE: CPT

## 2024-02-08 PROCEDURE — 250N000011 HC RX IP 250 OP 636: Mod: JZ | Performed by: INTERNAL MEDICINE

## 2024-02-08 PROCEDURE — G0463 HOSPITAL OUTPT CLINIC VISIT: HCPCS | Mod: 25 | Performed by: INTERNAL MEDICINE

## 2024-02-08 PROCEDURE — 86140 C-REACTIVE PROTEIN: CPT

## 2024-02-08 PROCEDURE — 82595 ASSAY OF CRYOGLOBULIN: CPT

## 2024-02-08 PROCEDURE — 81001 URINALYSIS AUTO W/SCOPE: CPT

## 2024-02-08 PROCEDURE — 99000 SPECIMEN HANDLING OFFICE-LAB: CPT

## 2024-02-08 PROCEDURE — 20610 DRAIN/INJ JOINT/BURSA W/O US: CPT | Performed by: INTERNAL MEDICINE

## 2024-02-08 PROCEDURE — 84165 PROTEIN E-PHORESIS SERUM: CPT | Performed by: STUDENT IN AN ORGANIZED HEALTH CARE EDUCATION/TRAINING PROGRAM

## 2024-02-08 PROCEDURE — 86334 IMMUNOFIX E-PHORESIS SERUM: CPT | Performed by: PATHOLOGY

## 2024-02-08 PROCEDURE — 250N000009 HC RX 250: Performed by: INTERNAL MEDICINE

## 2024-02-08 PROCEDURE — 85652 RBC SED RATE AUTOMATED: CPT

## 2024-02-08 PROCEDURE — 36415 COLL VENOUS BLD VENIPUNCTURE: CPT

## 2024-02-08 PROCEDURE — 99204 OFFICE O/P NEW MOD 45 MIN: CPT | Performed by: INTERNAL MEDICINE

## 2024-02-08 PROCEDURE — 84156 ASSAY OF PROTEIN URINE: CPT

## 2024-02-08 PROCEDURE — 86160 COMPLEMENT ANTIGEN: CPT

## 2024-02-08 PROCEDURE — 86147 CARDIOLIPIN ANTIBODY EA IG: CPT

## 2024-02-08 PROCEDURE — 84155 ASSAY OF PROTEIN SERUM: CPT | Mod: 59

## 2024-02-08 RX ORDER — MODAFINIL 100 MG/1
100 TABLET ORAL DAILY
Start: 2024-02-08 | End: 2024-02-08

## 2024-02-08 RX ORDER — MODAFINIL 100 MG/1
100 TABLET ORAL DAILY
Qty: 30 TABLET | Refills: 5 | Status: SHIPPED | OUTPATIENT
Start: 2024-02-08

## 2024-02-08 RX ORDER — METHYLPREDNISOLONE ACETATE 40 MG/ML
40 INJECTION, SUSPENSION INTRA-ARTICULAR; INTRALESIONAL; INTRAMUSCULAR; SOFT TISSUE ONCE
Status: COMPLETED | OUTPATIENT
Start: 2024-02-08 | End: 2024-02-08

## 2024-02-08 RX ORDER — LIDOCAINE HYDROCHLORIDE 10 MG/ML
1 INJECTION, SOLUTION EPIDURAL; INFILTRATION; INTRACAUDAL; PERINEURAL ONCE
Status: COMPLETED | OUTPATIENT
Start: 2024-02-08 | End: 2024-02-08

## 2024-02-08 RX ORDER — PILOCARPINE HYDROCHLORIDE 5 MG/1
5 TABLET, FILM COATED ORAL 4 TIMES DAILY
Qty: 120 TABLET | Refills: 11 | Status: SHIPPED | OUTPATIENT
Start: 2024-02-08 | End: 2024-05-29

## 2024-02-08 RX ADMIN — METHYLPREDNISOLONE ACETATE 40 MG: 40 INJECTION, SUSPENSION INTRA-ARTICULAR; INTRALESIONAL; INTRAMUSCULAR; INTRASYNOVIAL; SOFT TISSUE at 10:54

## 2024-02-08 RX ADMIN — LIDOCAINE HYDROCHLORIDE 1 ML: 10 INJECTION, SOLUTION EPIDURAL; INFILTRATION; INTRACAUDAL; PERINEURAL at 10:53

## 2024-02-08 ASSESSMENT — PAIN SCALES - GENERAL: PAINLEVEL: SEVERE PAIN (6)

## 2024-02-08 NOTE — NURSING NOTE
"Chief Complaint   Patient presents with    Consult     Establish care with sjogrens     /78   Pulse 67   Ht 1.6 m (5' 3\")   Wt 68.1 kg (150 lb 1.6 oz)   BMI 26.59 kg/m    Sharmin Renee, Lead CMA  2/8/2024 7:58 AM    "

## 2024-02-08 NOTE — LETTER
2/8/2024       RE: Pily Mejia  [address] [address]     Dear Colleague,    Thank you for referring your patient, Pily Mejia, to the [facility] RHEUMATOLOGY CLINIC [place] at [facility]. Please see a copy of my visit note below.    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    Her major complaint is extreme fatigue and pain.    She has h/o Sjogren's.    She was on HCQ 4635-6972. Stopped due to dizziness (not tolerable, resolved off HCQ)). It also caused hair loss (grew back off HCQ) and nausea (tolerable with eating it with meals)    Was on methotrexate, it caused renal/liver abnormality, she does not want to go back on it. Without it, her knees hurt more, has torn tendon over R hip and has pain over R hip with radiation to the R leg. It did hurt on carrying luggage, babies on the trip. Hands are swollen and hurt.    Used to be on AZA.    Skin is dry.    Eyes are dry, uses restasis.    Lung nodes in her lungs were found as part of CP work up.    Likes to take control of her Sjogren's.    Morphea is stable off methotrexate.    Has fatigue. Thinks that meds helped with fatigue.    Takes vit D with K, B, probiotic, life (collagen for joints).    Walks 6000 steps a day, during trip, more walking triggered pain.    It is hard in the morning to get up and move.    Uses voltaren gel over knees and takes tylenol.    Drinks a lot of water plus tea.    No ETOH.    Belongs to with watchers.    No other rashes, just dry spots over legs.     Gets red dots from sun exposure. Uses sun screen and sun protective clothes.    Has h/o rosacea    Has some tingling over R leg after overdoing  it.    Has dysautonomia based on testing at [facility].     Has GI sx, with certain diet gets N/V/D, sometimes has constipation. Gets bloating.    Gets canker sores, not constantly. Roof of her mouth is sensitive.    No h/o seizures.    Resatsis controls dryness of eyes.    Drinking water helps, has used fluoride.    No Raynaud's.    No persistent parotid gland swelling.    No LAP.    No cough, CP.    Gets ESOB, quit aerobics during covid pandemic, attributes it to deconditioning.    Could walk without need to stop by drinking water.    Mood is ok.    Sometimes has brain fog.          ROS:  A comprehensive ROS was done, positives are per HPI.    Her records were reviewed.        CT Chest w/o Contrast  Order: [id number]  Impression    1.  Solid nodule right lower lobe and groundglass nodule left lower lobe, stable on examinations dating back to 11/20/2020.  Narrative    For Patients: As a result of the 21st Century Cures Act, medical imaging exams and procedure reports are released immediately into your electronic medical record. You may view this report before your referring provider. If you have questions, please contact your health care provider.    EXAM: CT CHEST WITHOUT CONTRAST  LOCATION: [facility]  DATE: 12/06/2023    INDICATION: Pulmonary nodules.  COMPARISON: 08/29/2022. 02/04/2021. 11/20/2020.  TECHNIQUE: CT chest without IV contrast. Multiplanar reformats were obtained. Dose reduction techniques were used.  CONTRAST: None.    FINDINGS:  LUNGS AND PLEURA:    Right lung: 7 x 5 mm nodule periphery of the right lower lobe medially series 4 image 168, stable. No other nodules in the right lung.    Left lung: Groundglass nodule left lower lobe series 4 image 110 measuring approximately 6 mm, stable. Tiny granuloma left lower lobe.    No new nodules.    MEDIASTINUM/AXILLAE: 1.8 cm nodule inferior left lobe thyroid, stable. No adenopathy.    CORONARY ARTERY CALCIFICATION: None.    UPPER ABDOMEN: No significant  finding.    MUSCULOSKELETAL: Unremarkable.      US Parotid Submandibular Glands  Order: [id number]  Impression    The parotid and submandibular glands on each side demonstrate normal  sonographic appearance.  Narrative    EXAM: US PAROTID SUBMANDIBULAR GLANDS    COMPARISON: None.    FINDINGS: The submandibular and parotid glands on each side demonstrate normal  sonographic appearance. The glandular parenchyma is homogeneous.  A 0.6 cm in the greatest dimension hypoechoic nodule in the deep left parotid  gland likely represents a tiny intraglandular lymph node. No other mass is  identified.  Exam End: 12/21/21  3:22 PM    Specimen Collected: 12/21/21  3:27 PM Last Resulted: 12/21/21  3:31 PM   Received From: [facility]  Result Received: 05/09/23  1:30 PM      EXAM: US THYROID/PARATHYROID  LOCATION: [facility]  DATE/TIME: 8/21/2020 3:10 PM    INDICATION: Thyroid Cyst  COMPARISON: CT chest 08/14/2020  TECHNIQUE: Thyroid ultrasound.    FINDINGS:  RIGHT lobe: 1.5 x 1.1 x 1.5 cm. Homogeneous echotexture. Tiny 3 mm cyst is noted  within the interpolar region of the right lobe of the thyroid.  Isthmus: 2 mm.  LEFT lobe: 5 x 0.9 x 1.4 cm. Homogeneous echotexture.    NECK: No cervical lymphadenopathy. 3.2 x 2.1 x 1.8 cm cyst is noted inferior to  the left lobe of the thyroid.    NODULES:    IMPRESSION:  1.  3.2 x 2.1 x 1.8 cm cyst is noted inferior to the left lobe of the thyroid.      Nodules are characterized per  ACR Thyroid Imaging, Reporting and Data System (TI-RADS): White Paper of the ACR  TI-RADS Committee  Jeremy Bedoya et al. Journal of the American College of Radiology 2017.  Volume 14 (2017), Issue 5, 439-328.  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 SHe thinks that is since childhood.  - Other autonomic reflex screen was minimally abnormal. Isolated finding of focal reduced sweat response to the proximal leg with normal cardiovagal and adrenergic reflexes, which was of uncertain significance.   - Brain MRI wnl.    Rosacea     Osteopenia      S/p Hysterectomy with bilateral salpingo-oophorectomy for endometriosis, age 36.    Chronic stable ground glass opacities rt lower lobe     Hx of vitamin D deficiency     Diminished thyroid uptake on left compared to rt on scan         Past Surgical History:   Procedure Laterality Date    HYSTERECTOMY      OOPHORECTOMY       Family History   Problem Relation Age of Onset    Breast Cancer No family hx of    No FHx of diagnosed autoimmune conditions    Social History     Socioeconomic History    Marital status:      Spouse name: Not on file    Number of children: Not on file    Years of education: Not on file    Highest education level: Not on file   Occupational History    Not on file   Tobacco Use    Smoking status: Never    Smokeless tobacco: Never   Substance and Sexual Activity    Alcohol use: Not on file    Drug use: Not on file    Sexual activity: Not on file   Other Topics Concern    Not on file   Social History Narrative    Not on file     Social Determinants of Health     Financial Resource Strain: Not on file   Food Insecurity: Not on file   Transportation Needs: Not on file   Physical Activity: Not on file   Stress: Not on file   Social Connections: Not on file   Interpersonal Safety: Not on file   Housing Stability: Not on file     Patient Active Problem List   Diagnosis    Osteopenia    Postsurgical Acquired Absence Of Genitalia     Allergies   Allergen Reactions    Codeine     Fish Oil GI Disturbance    Hydroxychloroquine Other  (See Comments) and GI Disturbance    Milk (Cow) GI Disturbance    No Clinical Screening - See Comments Other (See Comments)     Triminycine per patient    Penicillins     Soybean Oil GI Disturbance       Outpatient Encounter Medications as of 2/8/2024   Medication Sig Dispense Refill    aspirin (ASA) 81 MG EC tablet Take 81 mg by mouth daily      cetirizine (ZYRTEC) 10 MG tablet Take 5 mg by mouth      cyanocobalamin (VITAMIN B-12) 2500 MCG SUBL sublingual tablet Place under the tongue once daily.      gabapentin (NEURONTIN) 100 MG capsule Take 3 capsules (300 mg) by mouth at bedtime 90 capsule 11    pimecrolimus (ELIDEL) 1 % external cream Apply topically 2 times daily 60 g 4    acetaminophen (TYLENOL) 500 MG tablet Take 1,000 mg by mouth (Patient not taking: Reported on 10/27/2023)      Azelastine HCl 137 MCG/SPRAY SOLN INSTILL 1 TO 2 SPRAYS INTO EACH NOSTRIL TWICE DAILY (Patient not taking: Reported on 9/22/2022)  1    Calcium Carbonate Antacid 1177 MG CHEW As needed (Patient not taking: Reported on 9/22/2022)      diclofenac (VOLTAREN) 1 % topical gel  (Patient not taking: Reported on 9/22/2022)      fluticasone (FLONASE) 50 MCG/ACT nasal spray Spray 1 spray in nostril (Patient not taking: Reported on 9/22/2022)      folic acid (FOLVITE) 1 MG tablet Take 1 tablet (1 mg) by mouth daily (Patient not taking: Reported on 2/1/2024) 90 tablet 2    lidocaine (LMX4) 4 % external cream Apply topically once as needed for mild pain (Patient not taking: Reported on 11/18/2022) 45 g 1    methotrexate sodium 2.5 MG TABS Take 4 tablets (10 m g ) once per week for 3 months (Patient not taking: Reported on 2/1/2024) 65 tablet 2    metroNIDAZOLE (METROCREAM) 0.75 % external cream Apply topically daily (Patient not taking: Reported on 2/23/2023) 45 g 11    mometasone (ELOCON) 0.1 % external cream PLEASE SEE ATTACHED FOR DETAILED DIRECTIONS (Patient not taking: Reported on 9/22/2022)  11    probiotic CAPS Take 1 capsule by mouth  "(Patient not taking: Reported on 11/18/2022)       No facility-administered encounter medications on file as of 2/8/2024.             Ph.E:    /78   Pulse 67   Ht 1.6 m (5' 3\")   Wt 68.1 kg (150 lb 1.6 oz)   BMI 26.59 kg/m        Constitutional: WD/WN. Pleasant. In no acute distress.  present.  Eyes: EOM intact, sclera anicteric, conj not injected  HEENT: No oral ulcers or thrush. Poor salivary pool.  Neck: No cervical LAP or thyromegaly  Chest: Clear to auscultation bilaterally  CV: RRR, no murmurs/ rubs or gallops. No edema, clubbing or cyanosis.   GI: Abdomen is soft and non tender. No HSP.  MS: No synovitis. Cool joints. + tenderness over r hip greater trochanter  Skin: morphea over forehead  Neuro: A&O x 3. Grossly non focal  Psych: NL affect    Assessment/ plan:      1. Sjogren's. Discussed Dx, Mx. Reviewed labs. Will address managing dry mouth, fatigue. R hip pain is not Sjogren's related.    2. R hip bursitis. Injected today    3. Thyroid nodule. Will check US for follow up    4. Parotid gland cyst. Will check US for follow up    5. Morphea. Failed AZA, did not tolerate HCQ (dizziness, hair loss) or methotrexate (liver abnormality). Stable off methotrexate. Recommend follow up with rheum-derm, cellcept could be an option in future.    Plan:    US of parotid/minor salivary gland and US thyroid    Labs today    R hip trochanteric bursitis shot today    Try provigil for fatigue; risks were discussed    Try pilocarpine for dry mouth; risks were discussed    Return video or in person in about 3 months    Orders Placed This Encounter   Procedures    US head neck soft tissue    US Thyroid    Cryoglobulin, Quantitative with Reflex to Identification    Complement C4    Complement C3    CRP inflammation    DNA double stranded antibodies    Erythrocyte sedimentation rate auto    UA with Microscopic reflex to Culture    Protein  random urine    Creatinine random urine    Cardiolipin Carla IgG and IgM    " Cardiolipin Antibody IgA    Beta 2 Glycoprotein 1 Antibody IgA    Protein electrophoresis       Annabel Parnell MD      DATE OF PROCEDURE: 2/8/2024    PROCEDURE: Right hip trochanteric bursitis injection     After a discussion of risks, benefits and side effects of procedure, informed patient consent was obtained and was documented in patient's chart.   The right was marked, prepped and draped in the usual clean fashion. Iodine was used to clean the area and ethyl chloride spray was used for local anesthesia. R hip trochanteric bursitis was injected with mixed 1 mL of Lidocaine 1%+40 mg of depomedrol.   The patient tolerated the procedure well and there was no immediate complications post procedure.    Annabel Parnell MD

## 2024-02-08 NOTE — PROGRESS NOTES
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    Her major complaint is extreme fatigue and pain.    She has h/o Sjogren's.    She was on HCQ 7947-7453. Stopped due to dizziness (not tolerable, resolved off HCQ)). It also caused hair loss (grew back off HCQ) and nausea (tolerable with eating it with meals)    Was on methotrexate, it caused renal/liver abnormality, she does not want to go back on it. Without it, her knees hurt more, has torn tendon over R hip and has pain over R hip with radiation to the R leg. It did hurt on carrying luggage, babies on the trip. Hands are swollen and hurt.    Used to be on AZA.    Skin is dry.    Eyes are dry, uses restasis.    Lung nodes in her lungs were found as part of CP work up.    Likes to take control of her Sjogren's.    Morphea is stable off methotrexate.    Has fatigue. Thinks that meds helped with fatigue.    Takes vit D with K, B, probiotic, life (collagen for joints).    Walks 6000 steps a day, during trip, more walking triggered pain.    It is hard in the morning to get up and move.    Uses voltaren gel over knees and takes tylenol.    Drinks a lot of water plus tea.    No ETOH.    Belongs to with watchers.    No other rashes, just dry spots over legs.     Gets red dots from sun exposure. Uses sun screen and sun protective clothes.    Has h/o rosacea    Has some tingling over R leg after overdoing it.    Has dysautonomia based on testing at [place].     Has GI sx, with certain diet gets N/V/D, sometimes has constipation. Gets bloating.    Gets canker sores, not constantly. Roof of her mouth is sensitive.    No h/o seizures.    Resatsis controls dryness of eyes.    Drinking water helps, has used fluoride.    No  Raynaud's.    No persistent parotid gland swelling.    No LAP.    No cough, CP.    Gets ESOB, quit aerobics during covid pandemic, attributes it to deconditioning.    Could walk without need to stop by drinking water.    Mood is ok.    Sometimes has brain fog.          ROS:  A comprehensive ROS was done, positives are per HPI.    Her records were reviewed.        CT Chest w/o Contrast  Order: [id number]  Impression    1.  Solid nodule right lower lobe and groundglass nodule left lower lobe, stable on examinations dating back to 11/20/2020.  Narrative    For Patients: As a result of the 21st Century Cures Act, medical imaging exams and procedure reports are released immediately into your electronic medical record. You may view this report before your referring provider. If you have questions, please contact your health care provider.    EXAM: CT CHEST WITHOUT CONTRAST  LOCATION: [facility] MEDICAL IMAGING  DATE: 12/06/2023    INDICATION: Pulmonary nodules.  COMPARISON: 08/29/2022. 02/04/2021. 11/20/2020.  TECHNIQUE: CT chest without IV contrast. Multiplanar reformats were obtained. Dose reduction techniques were used.  CONTRAST: None.    FINDINGS:  LUNGS AND PLEURA:    Right lung: 7 x 5 mm nodule periphery of the right lower lobe medially series 4 image 168, stable. No other nodules in the right lung.    Left lung: Groundglass nodule left lower lobe series 4 image 110 measuring approximately 6 mm, stable. Tiny granuloma left lower lobe.    No new nodules.    MEDIASTINUM/AXILLAE: 1.8 cm nodule inferior left lobe thyroid, stable. No adenopathy.    CORONARY ARTERY CALCIFICATION: None.    UPPER ABDOMEN: No significant finding.    MUSCULOSKELETAL: Unremarkable.      US Parotid Submandibular Glands  Order: [id number]  Impression    The parotid and submandibular glands on each side demonstrate normal  sonographic appearance.  Narrative    EXAM: US PAROTID SUBMANDIBULAR GLANDS    COMPARISON: None.    FINDINGS: The submandibular and  parotid glands on each side demonstrate normal  sonographic appearance. The glandular parenchyma is homogeneous.  A 0.6 cm in the greatest dimension hypoechoic nodule in the deep left parotid  gland likely represents a tiny intraglandular lymph node. No other mass is  identified.  Exam End: 12/21/21  3:22 PM    Specimen Collected: 12/21/21  3:27 PM Last Resulted: 12/21/21  3:31 PM   Received From: [facility]  Result Received: 05/09/23  1:30 PM       EXAM: US THYROID/PARATHYROID  LOCATION: [facility]  DATE/TIME: 8/21/2020 3:10 PM    INDICATION: Thyroid Cyst  COMPARISON: CT chest 08/14/2020  TECHNIQUE: Thyroid ultrasound.    FINDINGS:  RIGHT lobe: 1.5 x 1.1 x 1.5 cm. Homogeneous echotexture. Tiny 3 mm cyst is noted  within the interpolar region of the right lobe of the thyroid.  Isthmus: 2 mm.  LEFT lobe: 5 x 0.9 x 1.4 cm. Homogeneous echotexture.    NECK: No cervical lymphadenopathy. 3.2 x 2.1 x 1.8 cm cyst is noted inferior to  the left lobe of the thyroid.    NODULES:    IMPRESSION:  1.  3.2 x 2.1 x 1.8 cm cyst is noted inferior to the left lobe of the thyroid.      Nodules are characterized per  ACR Thyroid Imaging, Reporting and Data System (TI-RADS): White Paper of the ACR  TI-RADS Committee  Jeremy Bedoya et al. Journal of the American College of Radiology 2017.  Volume 14 (2017), Issue 5, 885-870.  Exam End: 08/21/20  3:10 PM    Specimen Collected: 08/21/20  3:10 PM Last Resulted: 08/21/20  3:29 PM   Received From: [employer] [facility]  Result Received: 05/09/23  1:26 PM       PMHx:    Sjogren syndrome (+RUSLAN 1:320, speckled, +Ro60 234, +SSA 4.6, weak +APLA 18.4)  - RF-, C3/C4 normal, dsDNA-, Ig normal, HCV-, SPEP normal    Burning dysesthesia on face      Anhidrosis   -  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 SHe thinks that is since childhood.  - Other autonomic reflex screen was minimally abnormal. Isolated finding of focal reduced sweat response to the  proximal leg with normal cardiovagal and adrenergic reflexes, which was of uncertain significance.   - Brain MRI wnl.    Rosacea     Osteopenia      S/p Hysterectomy with bilateral salpingo-oophorectomy for endometriosis, age 36.    Chronic stable ground glass opacities rt lower lobe     Hx of vitamin D deficiency     Diminished thyroid uptake on left compared to rt on scan         Past Surgical History:   Procedure Laterality Date     HYSTERECTOMY       OOPHORECTOMY       Family History   Problem Relation Age of Onset     Breast Cancer No family hx of    No FHx of diagnosed autoimmune conditions    Social History     Socioeconomic History     Marital status:      Spouse name: Not on file     Number of children: Not on file     Years of education: Not on file     Highest education level: Not on file   Occupational History     Not on file   Tobacco Use     Smoking status: Never     Smokeless tobacco: Never   Substance and Sexual Activity     Alcohol use: Not on file     Drug use: Not on file     Sexual activity: Not on file   Other Topics Concern     Not on file   Social History Narrative     Not on file     Social Determinants of Health     Financial Resource Strain: Not on file   Food Insecurity: Not on file   Transportation Needs: Not on file   Physical Activity: Not on file   Stress: Not on file   Social Connections: Not on file   Interpersonal Safety: Not on file   Housing Stability: Not on file     Patient Active Problem List   Diagnosis     Osteopenia     Postsurgical Acquired Absence Of Genitalia     Allergies   Allergen Reactions     Codeine      Fish Oil GI Disturbance     Hydroxychloroquine Other (See Comments) and GI Disturbance     Milk (Cow) GI Disturbance     No Clinical Screening - See Comments Other (See Comments)     Triminycine per patient     Penicillins      Soybean Oil GI Disturbance       Outpatient Encounter Medications as of 2024   Medication Sig Dispense Refill      "aspirin (ASA) 81 MG EC tablet Take 81 mg by mouth daily       cetirizine (ZYRTEC) 10 MG tablet Take 5 mg by mouth       cyanocobalamin (VITAMIN B-12) 2500 MCG SUBL sublingual tablet Place under the tongue once daily.       gabapentin (NEURONTIN) 100 MG capsule Take 3 capsules (300 mg) by mouth at bedtime 90 capsule 11     pimecrolimus (ELIDEL) 1 % external cream Apply topically 2 times daily 60 g 4     acetaminophen (TYLENOL) 500 MG tablet Take 1,000 mg by mouth (Patient not taking: Reported on 10/27/2023)       Azelastine HCl 137 MCG/SPRAY SOLN INSTILL 1 TO 2 SPRAYS INTO EACH NOSTRIL TWICE DAILY (Patient not taking: Reported on 9/22/2022)  1     Calcium Carbonate Antacid 1177 MG CHEW As needed (Patient not taking: Reported on 9/22/2022)       diclofenac (VOLTAREN) 1 % topical gel  (Patient not taking: Reported on 9/22/2022)       fluticasone (FLONASE) 50 MCG/ACT nasal spray Spray 1 spray in nostril (Patient not taking: Reported on 9/22/2022)       folic acid (FOLVITE) 1 MG tablet Take 1 tablet (1 mg) by mouth daily (Patient not taking: Reported on 2/1/2024) 90 tablet 2     lidocaine (LMX4) 4 % external cream Apply topically once as needed for mild pain (Patient not taking: Reported on 11/18/2022) 45 g 1     methotrexate sodium 2.5 MG TABS Take 4 tablets (10 m g ) once per week for 3 months (Patient not taking: Reported on 2/1/2024) 65 tablet 2     metroNIDAZOLE (METROCREAM) 0.75 % external cream Apply topically daily (Patient not taking: Reported on 2/23/2023) 45 g 11     mometasone (ELOCON) 0.1 % external cream PLEASE SEE ATTACHED FOR DETAILED DIRECTIONS (Patient not taking: Reported on 9/22/2022)  11     probiotic CAPS Take 1 capsule by mouth (Patient not taking: Reported on 11/18/2022)       No facility-administered encounter medications on file as of 2/8/2024.             Ph.E:    /78   Pulse 67   Ht 1.6 m (5' 3\")   Wt 68.1 kg (150 lb 1.6 oz)   BMI 26.59 kg/m        Constitutional: WD/WN. Pleasant. In " no acute distress.  present.  Eyes: EOM intact, sclera anicteric, conj not injected  HEENT: No oral ulcers or thrush. Poor salivary pool.  Neck: No cervical LAP or thyromegaly  Chest: Clear to auscultation bilaterally  CV: RRR, no murmurs/ rubs or gallops. No edema, clubbing or cyanosis.   GI: Abdomen is soft and non tender. No HSP.  MS: No synovitis. Cool joints. + tenderness over r hip greater trochanter  Skin: morphea over forehead  Neuro: A&O x 3. Grossly non focal  Psych: NL affect    Assessment/ plan:      1. Sjogren's. Discussed Dx, Mx. Reviewed labs. Will address managing dry mouth, fatigue. R hip pain is not Sjogren's related.    2. R hip bursitis. Injected today    3. Thyroid nodule. Will check US for follow up    4. Parotid gland cyst. Will check US for follow up    5. Morphea. Failed AZA, did not tolerate HCQ (dizziness, hair loss) or methotrexate (liver abnormality). Stable off methotrexate. Recommend follow up with rheum-derm, cellcept could be an option in future.    Plan:    US of parotid/minor salivary gland and US thyroid    Labs today    R hip trochanteric bursitis shot today    Try provigil for fatigue; risks were discussed    Try pilocarpine for dry mouth; risks were discussed    Return video or in person in about 3 months    Orders Placed This Encounter   Procedures     US head neck soft tissue     US Thyroid     Cryoglobulin, Quantitative with Reflex to Identification     Complement C4     Complement C3     CRP inflammation     DNA double stranded antibodies     Erythrocyte sedimentation rate auto     UA with Microscopic reflex to Culture     Protein  random urine     Creatinine random urine     Cardiolipin Carla IgG and IgM     Cardiolipin Antibody IgA     Beta 2 Glycoprotein 1 Antibody IgA     Protein electrophoresis       Annabel Parnell MD      DATE OF PROCEDURE: 2/8/2024    PROCEDURE: Right hip trochanteric bursitis injection     After a discussion of risks, benefits and side effects  of procedure, informed patient consent was obtained and was documented in patient's chart.   The right was marked, prepped and draped in the usual clean fashion. Iodine was used to clean the area and ethyl chloride spray was used for local anesthesia. R hip trochanteric bursitis was injected with mixed 1 mL of Lidocaine 1%+40 mg of depomedrol.   The patient tolerated the procedure well and there was no immediate complications post procedure.    Annabel Parnell MD

## 2024-02-09 ENCOUNTER — TELEPHONE (OUTPATIENT)
Dept: RHEUMATOLOGY | Facility: CLINIC | Age: 74
End: 2024-02-09
Payer: COMMERCIAL

## 2024-02-09 DIAGNOSIS — M35.00 SJOGREN SYNDROME, UNSPECIFIED (H): Primary | ICD-10-CM

## 2024-02-09 LAB
ALBUMIN MFR UR ELPH: <6 MG/DL
ALBUMIN SERPL ELPH-MCNC: 4.5 G/DL (ref 3.7–5.1)
ALPHA1 GLOB SERPL ELPH-MCNC: 0.3 G/DL (ref 0.2–0.4)
ALPHA2 GLOB SERPL ELPH-MCNC: 0.7 G/DL (ref 0.5–0.9)
B-GLOBULIN SERPL ELPH-MCNC: 0.8 G/DL (ref 0.6–1)
B2 GLYCOPROT1 IGA SER-ACNC: 9.6 U/ML
C3 SERPL-MCNC: 147 MG/DL (ref 81–157)
C4 SERPL-MCNC: 18 MG/DL (ref 13–39)
CARDIOLIPIN IGA SER IA-ACNC: 5.6 APL-U/ML
CARDIOLIPIN IGA SER IA-ACNC: NEGATIVE
CARDIOLIPIN IGG SER IA-ACNC: <2 GPL-U/ML
CARDIOLIPIN IGG SER IA-ACNC: NEGATIVE
CARDIOLIPIN IGM SER IA-ACNC: <2 MPL-U/ML
CARDIOLIPIN IGM SER IA-ACNC: NEGATIVE
CREAT UR-MCNC: 18.1 MG/DL
CRP SERPL-MCNC: <3 MG/L
DSDNA AB SER-ACNC: 5.8 IU/ML
GAMMA GLOB SERPL ELPH-MCNC: 0.9 G/DL (ref 0.7–1.6)
LOCATION OF TASK: NORMAL
M PROTEIN SERPL ELPH-MCNC: 0 G/DL
PROT PATTERN SERPL ELPH-IMP: NORMAL
PROT/CREAT 24H UR: NORMAL MG/G{CREAT}
TOTAL PROTEIN SERUM FOR ELP: 7.2 G/DL (ref 6.4–8.3)

## 2024-02-09 RX ORDER — MODAFINIL 100 MG/1
100 TABLET ORAL DAILY
Qty: 30 TABLET | Refills: 5 | Status: SHIPPED | OUTPATIENT
Start: 2024-02-09

## 2024-02-09 NOTE — TELEPHONE ENCOUNTER
Call to patient to discuss the denial from [employer], reviewed Dr. Parnell's recommendation to use good [facility] to fill modafinil prescription.  Assisted patient with loading of good [facility] jami on her phone.    Patient would like to pay for a month and try the medication. Patient requesting for the prescription to be sent to [facility].    Patient asked for call back once new script sent.    YUDY Woodward, RN  RN Care Coordinator Rheumatology

## 2024-02-09 NOTE — TELEPHONE ENCOUNTER
Follow up call to patient to discuss that Dr. Parnell sent the Modafinil to [facility] for her to use [facility].    All questions answered.    Marika Goodson, FIDENCION, RN  RN Care Coordinator Rheumatology

## 2024-02-09 NOTE — TELEPHONE ENCOUNTER
Retail Pharmacy Prior Authorization Team   Phone: [phone]    PRIOR AUTHORIZATION DENIED    Medication: MODAFINIL 100 MG PO TABS  Insurance Company: [employer] ([facility]) - Phone [phone] Fax [phone]  Denial Date: 2/9/2024  Denial Reason(s): DX MUST BE CONFIRMED BY A SLEEP STUDY UNLESS PRESCRIBER PROVIDES JUSTIFICATION      Appeal Information: IF PROVIDER WOULD LIKE TO APPEAL THIS DECISION PLEASE PROVIDE THE PA TEAM WITH A LETTER OF MEDICAL NECESSITY      Patient Notified: No

## 2024-02-11 LAB — RNAP III IGG SERPL IA-ACNC: 12 UNITS

## 2024-02-14 LAB — CRYOGLOB SER QL: ABNORMAL

## 2024-02-15 LAB
ALBUMIN SERPL ELPH-MCNC: NEGATIVE G/DL
CRYOGLOB IGA & IGG & IGM SER-IMP: NORMAL
CRYOGLOB TYP SER IFE: NEGATIVE

## 2024-02-29 ENCOUNTER — ANCILLARY PROCEDURE (OUTPATIENT)
Dept: ULTRASOUND IMAGING | Facility: CLINIC | Age: 74
End: 2024-02-29
Attending: INTERNAL MEDICINE
Payer: COMMERCIAL

## 2024-02-29 DIAGNOSIS — E04.1 THYROID CYST: ICD-10-CM

## 2024-02-29 DIAGNOSIS — I89.9 LYMPH NODE DISORDER: ICD-10-CM

## 2024-02-29 DIAGNOSIS — M35.00 PRIMARY SJOGREN'S SYNDROME (H): ICD-10-CM

## 2024-02-29 PROCEDURE — 99207 US HEAD NECK SOFT TISSUE: CPT | Mod: GC | Performed by: RADIOLOGY

## 2024-02-29 PROCEDURE — 76536 US EXAM OF HEAD AND NECK: CPT | Mod: GC | Performed by: RADIOLOGY

## 2024-05-02 ENCOUNTER — TELEPHONE (OUTPATIENT)
Dept: DERMATOLOGY | Facility: CLINIC | Age: 74
End: 2024-05-02

## 2024-05-02 NOTE — TELEPHONE ENCOUNTER
Patient Contacted  and schedule the following:    Appointment type: RDA  Provider: Dr. Araujo   Return date: 8/1/24  Specialty phone number: [phone]

## 2024-05-03 ENCOUNTER — TELEPHONE (OUTPATIENT)
Dept: DERMATOLOGY | Facility: CLINIC | Age: 74
End: 2024-05-03
Payer: COMMERCIAL

## 2024-05-03 NOTE — TELEPHONE ENCOUNTER
Patient confirmed scheduled appointment:  Date: 5/24/24  Time: 8:20 AM  Visit type: RDA  Provider: Dr. Araujo  [place] [facility] [place]

## 2024-05-06 NOTE — PROGRESS NOTES
[facility] Dermatology Note  Encounter Date: May 8, 2024  Office Visit     Dermatology Problem List:  1. 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 Sjogren syndrome (+RUSLAN 1:320, speckled, +Ro60 234, +SSA 4.6, weak +APLA 18.4)  - RF-, C3/C4 normal, dsDNA-, Ig normal, HCV-, SPEP normal  - prior: hydroxychloroquine (nausea, hair loss, dizziness)  - Salivary glands scan wnl  3. Burning dysesthesia on face  - gabapentin 100 mg daily  4. Anhidrosis   -  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 SHe thinks that is since childhood.  - Other autonomic reflex screen was minimally abnormal. Isolated finding of focal reduced sweat response to the proximal leg with normal cardiovagal and adrenergic reflexes, which was of uncertain significance.   - Brain MRI wnl.  5. Rosacea   - pimecrolimus PRN  - prior tx: doxycycline  6. Osteopenia  7. S/p Hysterectomy with bilateral salpingo-oophorectomy for endometriosis, age 36.  8. 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 Hx of vitamin D deficiency   10. Diminished thyroid uptake on left compared to rt on scan    ____________________________________________    Assessment & Plan:    # Seborrheic keratoses - cheeks x 5 and hands x 9. Patient cosmetically bothered by appearance, will treat today.  - See cryo procedure.     # Photoaging. Discussed filler injection, reviewed risks and cosmetic benefits. Possible risks of nodules. No dental procedures 2 weeks before and after.  - Consider fillers, quote given.   - Start [employer] Discoloration Defense once daily.      Procedures Performed:   - Cryotherapy procedure note, location(s): cheeks and hands. After verbal consent and discussion of risks and benefits including, but not limited to, dyspigmentation/scar, blister, and pain, 14 SK(s) was(were) treated with 1-2 mm freeze border for 1-2 cycles with  liquid nitrogen. Post cryotherapy instructions were provided.      Follow-up: 5/24/24 with Dr. Araujo for morphea, 3 months for cosmetic follow up and fillers.  Start with at least 1 lyft and 1 parage    Staff and Scribe:     Scribe Disclosure:   I, Gail Yang, am serving as a scribe to document services personally performed by Marilee Vences MD based on data collection and the provider's statements to me.       Provider Disclosure:   The documentation recorded by the scribe accurately reflects the services I personally performed and the decisions made by me.    Marilee Vences MD    Department of Dermatology  [facility]: Phone: [phone], Fax:[phone]  [facility] [place]: Phone: [phone], Fax: [phone]   ____________________________________________    CC: Consult (Pily is here for a solar lentigo/SK follow-up. )    HPI:  Ms. Pily Mejia is a(n) 74 year old female who presents today as a new patient for cosmetic consult.    Referred by Dr Eris Araujo for Solar lentigo and Seborrheic keratoses.    Patient is otherwise feeling well, without additional skin concerns.    Labs Reviewed:  N/A    Physical Exam:  Vitals: There were no vitals taken for this visit.  SKIN: Focused examination of face and hands was performed.  - Scattered brown macules on sun exposed areas.   - There are waxy stuck on tan to brown papules on the trunk and extremities.      - No other lesions of concern on areas examined.     Medications:  Current Outpatient Medications   Medication Sig Dispense Refill    aspirin (ASA) 81 MG EC tablet Take 81 mg by mouth daily      Azelastine HCl 137 MCG/SPRAY SOLN   1    cetirizine (ZYRTEC) 10 MG tablet Take 5 mg by mouth      cyanocobalamin (VITAMIN B-12) 2500 MCG SUBL sublingual tablet Place under the tongue once daily.      cycloSPORINE (RESTASIS) 0.05 % ophthalmic emulsion  Apply 1 drop to eye      gabapentin (NEURONTIN) 100 MG capsule Take 3 capsules (300 mg) by mouth at bedtime 90 capsule 11    modafinil (PROVIGIL) 100 MG tablet Take 1 tablet (100 mg) by mouth daily 30 tablet 5    modafinil (PROVIGIL) 100 MG tablet Take 1 tablet (100 mg) by mouth daily 30 tablet 5    pimecrolimus (ELIDEL) 1 % external cream Apply topically 2 times daily 60 g 4    VITAMIN D-VITAMIN K PO Vitamin D3 and Vitamin K2      acetaminophen (TYLENOL) 500 MG tablet Take 1,000 mg by mouth (Patient not taking: Reported on 10/27/2023)      Calcium Carbonate Antacid 1177 MG CHEW As needed (Patient not taking: Reported on 9/22/2022)      diclofenac (VOLTAREN) 1 % topical gel  (Patient not taking: Reported on 9/22/2022)      fluticasone (FLONASE) 50 MCG/ACT nasal spray Spray 1 spray in nostril (Patient not taking: Reported on 9/22/2022)      lidocaine (LMX4) 4 % external cream Apply topically once as needed for mild pain (Patient not taking: Reported on 11/18/2022) 45 g 1    metroNIDAZOLE (METROCREAM) 0.75 % external cream Apply topically daily (Patient not taking: Reported on 2/23/2023) 45 g 11    mometasone (ELOCON) 0.1 % external cream PLEASE SEE ATTACHED FOR DETAILED DIRECTIONS (Patient not taking: Reported on 9/22/2022)  11    pilocarpine (SALAGEN) 5 MG tablet Take 1 tablet (5 mg) by mouth 4 times daily (Patient not taking: Reported on 5/8/2024) 120 tablet 11    probiotic CAPS Take 1 capsule by mouth (Patient not taking: Reported on 11/18/2022)       No current facility-administered medications for this visit.      Past Medical History:   Patient Active Problem List   Diagnosis    Osteopenia    Postsurgical Acquired Absence Of Genitalia     No past medical history on file.     CC Eris Araujo MD  [address] [place] [address] on close of this encounter.

## 2024-05-08 ENCOUNTER — OFFICE VISIT (OUTPATIENT)
Dept: DERMATOLOGY | Facility: CLINIC | Age: 74
End: 2024-05-08
Attending: DERMATOLOGY
Payer: COMMERCIAL

## 2024-05-08 DIAGNOSIS — L82.1 SEBORRHEIC KERATOSES: Primary | ICD-10-CM

## 2024-05-08 DIAGNOSIS — L81.4 SOLAR LENTIGO: ICD-10-CM

## 2024-05-08 PROCEDURE — 96999 UNLISTED SPEC DERM SVC/PX: CPT | Performed by: DERMATOLOGY

## 2024-05-08 RX ORDER — CYCLOSPORINE 0.5 MG/ML
1 EMULSION OPHTHALMIC
COMMUNITY

## 2024-05-08 ASSESSMENT — PAIN SCALES - GENERAL: PAINLEVEL: NO PAIN (0)

## 2024-05-08 NOTE — PATIENT INSTRUCTIONS
If you are interested in filler injections, we can try 1 syringe of Restylane Kysse ($830) and 1 syringe of Restylane Lyft ($809).    Filler Information:    Risks of the procedure:I will have pain, bruising, redness, and swelling after the procedure and lasting for approximately 1-3 weeks. Risks are lumps/bumps, skin discoloration, bleeding, numbness, infection, granuloma formation, scar, ulceration, under correction, over correction and rarely, risks of stroke and blindness. Multiple treatments may be required.      Before the procedure:    Come with a clean face (no make-up or creams)  Avoid dental procedures 4 weeks prior to or after the visit  Notify the physician if you have a bee allergy  Avoid COVID vaccination 2 weeks before or after the filler procedure  Do not take blood thinners that are not already prescribed by your doctor  Expect 90 minutes in the office    What are  fillers?    Fillers are injected into the skin to soften crease or folds, support areas of volume loss or contour specific facial areas.  You may experience a mild to moderate amount of stinging or aching sensation post injection.  To ensure an even correction, the physician will massage the area treated, which may cause a temporary amount of redness to your skin.  Bruising at the site of injection is common and may last two weeks  Temporary minimal to moderate swelling can be expected, which should gradually improve following injection  It is normal to experience some tenderness at the treatment site for a few days    After treatment care instructions:  Apply an ice pack or cold compress to the injection area after treatment to help reduce swelling.  Keep your head elevated (even while sleeping) as much as possible and avoid sleeping on your side or stomach  No alcohol consumption or exercise for the first 24 hours after treatment.  Do not touch the treated area for 6 hours  You may use make-up, creams and sunscreens after 24  hours  You may return to normal/routine activities but you should check with your physician for their recommendation  Avoid excessive scrubbing or rubbing of the injection area  If you have previously suffered from cold sores, there is a risk that the needle punctures around the mouth/lips could contribute to another recurrence  Immediately report to your physician if you have any of the following:  Delayed swelling happening 7-14 days after treatment  Numbness lasting 3-4 days  Muscle weakness in the area of injection  Severe pain  Dusky discoloration of one part of the face  Bruising  Changes in vision or eye pain  Cold sore  Avoid COVID vaccination 2 weeks after filler and dental procedures 4 weeks after filler    Who should I call with questions?  [facility]: [phone]   [facility]: [phone]  For urgent needs outside of business hours call the [facility] at [phone] and ask for the resident on call  MyChart messages may be delayed, call for urgent issues

## 2024-05-08 NOTE — LETTER
5/8/2024       RE: Pily Mejia  [address] [facility] [address]     Dear Colleague,    Thank you for referring your patient, Pily Mejia, to the [facility] DERMATOLOGY [facility] [place] at [facility]. Please see a copy of my visit note below.    [facility] Dermatology Note  Encounter Date: May 8, 2024  Office Visit     Dermatology Problem List:  1. 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 Sjogren syndrome (+RUSLAN 1:320, speckled, +Ro60 234, +SSA 4.6, weak +APLA 18.4)  - RF-, C3/C4 normal, dsDNA-, Ig normal, HCV-, SPEP normal  - prior: hydroxychloroquine (nausea, hair loss, dizziness)  - Salivary glands scan wnl  3. Burning dysesthesia on face  - gabapentin 100 mg daily  4. Anhidrosis   -  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 SHe thinks that is since childhood.  - Other autonomic reflex screen was minimally abnormal. Isolated finding of focal reduced sweat response to the proximal leg with normal cardiovagal and adrenergic reflexes, which was of uncertain significance.   - Brain MRI wnl.  5. Rosacea   - pimecrolimus PRN  - prior tx: doxycycline  6. Osteopenia  7. S/p Hysterectomy with bilateral salpingo-oophorectomy for endometriosis, age 36.  8. 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 Hx of vitamin D deficiency   10. Diminished thyroid uptake on left compared to rt on scan    ____________________________________________    Assessment & Plan:    # Seborrheic keratoses - cheeks x 5 and hands x 9. Patient cosmetically bothered by appearance, will treat today.  - See cryo procedure.     # Photoaging. Discussed filler injection, reviewed risks and cosmetic benefits. Possible risks of nodules. No dental procedures 2 weeks before and after.  - Consider fillers, quote given.   - Start Skin[employer]uticals  Discoloration Defense once daily.      Procedures Performed:   - Cryotherapy procedure note, location(s): cheeks and hands. After verbal consent and discussion of risks and benefits including, but not limited to, dyspigmentation/scar, blister, and pain, 14 SK(s) was(were) treated with 1-2 mm freeze border for 1-2 cycles with liquid nitrogen. Post cryotherapy instructions were provided.      Follow-up: 5/24/24 with Dr. Araujo for morphea, 3 months for cosmetic follow up and fillers.  Start with at least 1 lyft and 1 kysse    Staff and Scribe:     Scribe Disclosure:   I, Gail Yang, am serving as a scribe to document services personally performed by Marilee Vences MD based on data collection and the provider's statements to me.       Provider Disclosure:   The documentation recorded by the scribe accurately reflects the services I personally performed and the decisions made by me.    Marilee Vences MD    Department of Dermatology  [facility]: Phone: [phone], Fax:[phone]  [facility]: Phone: [phone], Fax: [phone]   ____________________________________________    CC: Consult (Pily is here for a solar lentigo/SK follow-up. )    HPI:  Ms. Pily Mejia is a(n) 74 year old female who presents today as a new patient for cosmetic consult.    Referred by Dr Eris Araujo for Solar lentigo and Seborrheic keratoses.    Patient is otherwise feeling well, without additional skin concerns.    Labs Reviewed:  N/A    Physical Exam:  Vitals: There were no vitals taken for this visit.  SKIN: Focused examination of face and hands was performed.  - Scattered brown macules on sun exposed areas.   - There are waxy stuck on tan to brown papules on the trunk and extremities.      - No other lesions of concern on areas examined.     Medications:  Current Outpatient Medications   Medication Sig  Dispense Refill    aspirin (ASA) 81 MG EC tablet Take 81 mg by mouth daily      Azelastine HCl 137 MCG/SPRAY SOLN   1    cetirizine (ZYRTEC) 10 MG tablet Take 5 mg by mouth      cyanocobalamin (VITAMIN B-12) 2500 MCG SUBL sublingual tablet Place under the tongue once daily.      cycloSPORINE (RESTASIS) 0.05 % ophthalmic emulsion Apply 1 drop to eye      gabapentin (NEURONTIN) 100 MG capsule Take 3 capsules (300 mg) by mouth at bedtime 90 capsule 11    modafinil (PROVIGIL) 100 MG tablet Take 1 tablet (100 mg) by mouth daily 30 tablet 5    modafinil (PROVIGIL) 100 MG tablet Take 1 tablet (100 mg) by mouth daily 30 tablet 5    pimecrolimus (ELIDEL) 1 % external cream Apply topically 2 times daily 60 g 4    VITAMIN D-VITAMIN K PO Vitamin D3 and Vitamin K2      acetaminophen (TYLENOL) 500 MG tablet Take 1,000 mg by mouth (Patient not taking: Reported on 10/27/2023)      Calcium Carbonate Antacid 1177 MG CHEW As needed (Patient not taking: Reported on 9/22/2022)      diclofenac (VOLTAREN) 1 % topical gel  (Patient not taking: Reported on 9/22/2022)      fluticasone (FLONASE) 50 MCG/ACT nasal spray Spray 1 spray in nostril (Patient not taking: Reported on 9/22/2022)      lidocaine (LMX4) 4 % external cream Apply topically once as needed for mild pain (Patient not taking: Reported on 11/18/2022) 45 g 1    metroNIDAZOLE (METROCREAM) 0.75 % external cream Apply topically daily (Patient not taking: Reported on 2/23/2023) 45 g 11    mometasone (ELOCON) 0.1 % external cream PLEASE SEE ATTACHED FOR DETAILED DIRECTIONS (Patient not taking: Reported on 9/22/2022)  11    pilocarpine (SALAGEN) 5 MG tablet Take 1 tablet (5 mg) by mouth 4 times daily (Patient not taking: Reported on 5/8/2024) 120 tablet 11    probiotic CAPS Take 1 capsule by mouth (Patient not taking: Reported on 11/18/2022)       No current facility-administered medications for this visit.      Past Medical History:   Patient Active Problem List   Diagnosis     Osteopenia    Postsurgical Acquired Absence Of Genitalia     No past medical history on file.     CC Eris Araujo MD  [address] [place] [address] on close of this encounter.

## 2024-05-16 ENCOUNTER — LAB (OUTPATIENT)
Dept: LAB | Facility: CLINIC | Age: 74
End: 2024-05-16
Payer: COMMERCIAL

## 2024-05-16 DIAGNOSIS — D84.9 IMMUNOSUPPRESSION (H): ICD-10-CM

## 2024-05-16 DIAGNOSIS — E55.9 VITAMIN D DEFICIENCY: ICD-10-CM

## 2024-05-16 LAB
BASOPHILS # BLD AUTO: 0 10E3/UL (ref 0–0.2)
BASOPHILS NFR BLD AUTO: 0 %
EOSINOPHIL # BLD AUTO: 0.1 10E3/UL (ref 0–0.7)
EOSINOPHIL NFR BLD AUTO: 2 %
ERYTHROCYTE [DISTWIDTH] IN BLOOD BY AUTOMATED COUNT: 12.7 % (ref 10–15)
HCT VFR BLD AUTO: 43.9 % (ref 35–47)
HGB BLD-MCNC: 14.5 G/DL (ref 11.7–15.7)
IMM GRANULOCYTES # BLD: 0 10E3/UL
IMM GRANULOCYTES NFR BLD: 0 %
LYMPHOCYTES # BLD AUTO: 1.3 10E3/UL (ref 0.8–5.3)
LYMPHOCYTES NFR BLD AUTO: 30 %
MCH RBC QN AUTO: 29.8 PG (ref 26.5–33)
MCHC RBC AUTO-ENTMCNC: 33 G/DL (ref 31.5–36.5)
MCV RBC AUTO: 90 FL (ref 78–100)
MONOCYTES # BLD AUTO: 0.3 10E3/UL (ref 0–1.3)
MONOCYTES NFR BLD AUTO: 7 %
NEUTROPHILS # BLD AUTO: 2.5 10E3/UL (ref 1.6–8.3)
NEUTROPHILS NFR BLD AUTO: 60 %
PLATELET # BLD AUTO: 227 10E3/UL (ref 150–450)
RBC # BLD AUTO: 4.86 10E6/UL (ref 3.8–5.2)
WBC # BLD AUTO: 4.2 10E3/UL (ref 4–11)

## 2024-05-16 PROCEDURE — 85025 COMPLETE CBC W/AUTO DIFF WBC: CPT

## 2024-05-16 PROCEDURE — 36415 COLL VENOUS BLD VENIPUNCTURE: CPT

## 2024-05-16 PROCEDURE — 82306 VITAMIN D 25 HYDROXY: CPT

## 2024-05-16 PROCEDURE — 99000 SPECIMEN HANDLING OFFICE-LAB: CPT

## 2024-05-16 PROCEDURE — 84597 ASSAY OF VITAMIN K: CPT | Mod: 90

## 2024-05-16 PROCEDURE — 80053 COMPREHEN METABOLIC PANEL: CPT

## 2024-05-17 LAB
ALBUMIN SERPL BCG-MCNC: 4.3 G/DL (ref 3.5–5.2)
ALP SERPL-CCNC: 117 U/L (ref 40–150)
ALT SERPL W P-5'-P-CCNC: 18 U/L (ref 0–50)
ANION GAP SERPL CALCULATED.3IONS-SCNC: 10 MMOL/L (ref 7–15)
AST SERPL W P-5'-P-CCNC: 24 U/L (ref 0–45)
BILIRUB SERPL-MCNC: 0.4 MG/DL
BUN SERPL-MCNC: 13.9 MG/DL (ref 8–23)
CALCIUM SERPL-MCNC: 9.3 MG/DL (ref 8.8–10.2)
CHLORIDE SERPL-SCNC: 105 MMOL/L (ref 98–107)
CREAT SERPL-MCNC: 0.89 MG/DL (ref 0.51–0.95)
DEPRECATED HCO3 PLAS-SCNC: 26 MMOL/L (ref 22–29)
EGFRCR SERPLBLD CKD-EPI 2021: 68 ML/MIN/1.73M2
GLUCOSE SERPL-MCNC: 92 MG/DL (ref 70–99)
POTASSIUM SERPL-SCNC: 4.6 MMOL/L (ref 3.4–5.3)
PROT SERPL-MCNC: 6.9 G/DL (ref 6.4–8.3)
SODIUM SERPL-SCNC: 141 MMOL/L (ref 135–145)
VIT D+METAB SERPL-MCNC: 79 NG/ML (ref 20–50)

## 2024-05-20 LAB — PHYTONADIONE SERPL-MCNC: 0.92 NMOL/L

## 2024-05-24 ENCOUNTER — OFFICE VISIT (OUTPATIENT)
Dept: DERMATOLOGY | Facility: CLINIC | Age: 74
End: 2024-05-24
Payer: COMMERCIAL

## 2024-05-24 DIAGNOSIS — L94.0 MORPHEA: Primary | ICD-10-CM

## 2024-05-24 PROCEDURE — 99213 OFFICE O/P EST LOW 20 MIN: CPT | Mod: GC | Performed by: DERMATOLOGY

## 2024-05-24 ASSESSMENT — PAIN SCALES - GENERAL: PAINLEVEL: NO PAIN (0)

## 2024-05-24 NOTE — LETTER
5/24/2024       RE: Pily Mejia  [address] [facility] [facility] [address]     Dear Colleague,    Thank you for referring your patient, Pily Mejia, to the [facility]ERMAT[facility] [place] at [facility]. Please see a copy of my visit note below.    [facility] Dermatology Note  Encounter Date: May 24, 2024  Office Visit     Dermatology Problem List:  1. 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 Sjogren syndrome (+RUSLAN 1:320, speckled, +Ro60 234, +SSA 4.6, weak +APLA 18.4)  - RF-, C3/C4 normal, dsDNA-, Ig normal, HCV-, SPEP normal  - prior: hydroxychloroquine (nausea, hair loss, dizziness)  - Salivary glands scan wnl  3. Burning dysesthesia on face  - gabapentin 100 mg daily  4. Anhidrosis   -  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 SHe thinks that is since childhood.  - Other autonomic reflex screen was minimally abnormal. Isolated finding of focal reduced sweat response to the proximal leg with normal cardiovagal and adrenergic reflexes, which was of uncertain significance.   - Brain MRI wnl.  5. Rosacea   - pimecrolimus PRN  - prior tx: doxycycline  6. Osteopenia  7. S/p Hysterectomy with bilateral salpingo-oophorectomy for endometriosis, age 36.  8. 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 Hx of vitamin D deficiency   10. 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 No worsening of symptoms off systemic and topical therapies. Exam today is reassuring without erythema or induration.   - OK to hold off on topical and systemic treatments. Monitor for worsening.  - OK  to use topical discoloration serum rec'd by Dr. Vences  - Fine to trial cosmetic treatments like filler with Dr. Vences    Procedures Performed:   None    Follow-up: 3 month(s) in-person, or earlier for new or changing lesions    Staff Involved:   Staff/Scribe/Resident    Scribe Disclosure:   I, SHAHRZAD TIRADO, am serving as a scribe; to document services personally performed by Eris Araujo MD -based on data collection and the provider's statements to me.     Fernanda Hyman MD  Dermatology Resident PGY4    Staff Physician Comments:   I saw and evaluated the patient with the resident and I edited the assessment and plan as documented in the note. I was present for the examination.    Provider Disclosure:   The documentation recorded by the scribe accurately reflects the services I personally performed and the decisions made by me.    Eris Araujo MD   of Dermatology  Department of Dermatology  [facility]        ____________________________________________    CC: Derm Problem (Linear morphea (en coup de sabre): stable per patient)    HPI:  Ms. Pily Mejia is a(n) 74 year old female who presents today as a return patient for ECDS check. Last seen 3m ago at which time things were going well. She had been off of methotrexate for about 3m at that point and plan was to monitor for flare in the setting of being off systemic morphea therapy. Since this visit, now 6 months off of methotrexate, she has been doing well. No new redness or spreading to the area on the forehead. She saw Dr. Vences recently who rx'd a discoloration serum. Not really using medicated topicals at present. She notes plans for cosmetic tx/filler with    Patient is otherwise feeling well, without additional skin concerns.    Labs Reviewed:  N/A    Physical Exam:  Vitals: There were no vitals taken for this visit.  SKIN: Focused examination of face was performed.  - Ill defined area of  hyperpigmented macules on the L central forehead. No erythema, no tenderness, no induration  - No other lesions of concern on areas examined.     Medications:  Current Outpatient Medications   Medication Sig Dispense Refill    acetaminophen (TYLENOL) 500 MG tablet Take 1,000 mg by mouth (Patient not taking: Reported on 10/27/2023)      aspirin (ASA) 81 MG EC tablet Take 81 mg by mouth daily      Azelastine HCl 137 MCG/SPRAY SOLN   1    Calcium Carbonate Antacid 1177 MG CHEW As needed (Patient not taking: Reported on 9/22/2022)      cetirizine (ZYRTEC) 10 MG tablet Take 5 mg by mouth      cyanocobalamin (VITAMIN B-12) 2500 MCG SUBL sublingual tablet Place under the tongue once daily.      cycloSPORINE (RESTASIS) 0.05 % ophthalmic emulsion Apply 1 drop to eye      diclofenac (VOLTAREN) 1 % topical gel  (Patient not taking: Reported on 9/22/2022)      fluticasone (FLONASE) 50 MCG/ACT nasal spray Spray 1 spray in nostril (Patient not taking: Reported on 9/22/2022)      gabapentin (NEURONTIN) 100 MG capsule Take 3 capsules (300 mg) by mouth at bedtime 90 capsule 11    lidocaine (LMX4) 4 % external cream Apply topically once as needed for mild pain (Patient not taking: Reported on 11/18/2022) 45 g 1    metroNIDAZOLE (METROCREAM) 0.75 % external cream Apply topically daily (Patient not taking: Reported on 2/23/2023) 45 g 11    modafinil (PROVIGIL) 100 MG tablet Take 1 tablet (100 mg) by mouth daily 30 tablet 5    modafinil (PROVIGIL) 100 MG tablet Take 1 tablet (100 mg) by mouth daily 30 tablet 5    mometasone (ELOCON) 0.1 % external cream PLEASE SEE ATTACHED FOR DETAILED DIRECTIONS (Patient not taking: Reported on 9/22/2022)  11    pilocarpine (SALAGEN) 5 MG tablet Take 1 tablet (5 mg) by mouth 4 times daily (Patient not taking: Reported on 5/8/2024) 120 tablet 11    pimecrolimus (ELIDEL) 1 % external cream Apply topically 2 times daily 60 g 4    probiotic CAPS Take 1 capsule by mouth (Patient not taking: Reported on  11/18/2022)      VITAMIN D-VITAMIN K PO Vitamin D3 and Vitamin K2       No current facility-administered medications for this visit.      Past Medical History:   Patient Active Problem List   Diagnosis    Osteopenia    Postsurgical Acquired Absence Of Genitalia     No past medical history on file.     CC No referring provider

## 2024-05-24 NOTE — PROGRESS NOTES
[facility] Dermatology Note  Encounter Date: May 24, 2024  Office Visit     Dermatology Problem List:  1. 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 Sjogren syndrome (+RUSLAN 1:320, speckled, +Ro60 234, +SSA 4.6, weak +APLA 18.4)  - RF-, C3/C4 normal, dsDNA-, Ig normal, HCV-, SPEP normal  - prior: hydroxychloroquine (nausea, hair loss, dizziness)  - Salivary glands scan wnl  3. Burning dysesthesia on face  - gabapentin 100 mg daily  4. Anhidrosis   -  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 SHe thinks that is since childhood.  - Other autonomic reflex screen was minimally abnormal. Isolated finding of focal reduced sweat response to the proximal leg with normal cardiovagal and adrenergic reflexes, which was of uncertain significance.   - Brain MRI wnl.  5. Rosacea   - pimecrolimus PRN  - prior tx: doxycycline  6. Osteopenia  7. S/p Hysterectomy with bilateral salpingo-oophorectomy for endometriosis, age 36.  8. 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 Hx of vitamin D deficiency   10. 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 No worsening of symptoms off systemic and topical therapies. Exam today is reassuring without erythema or induration.   - OK to hold off on topical and systemic treatments. Monitor for worsening.  - OK to use topical discoloration serum rec'd by Dr. Vences  - Capo to trial cosmetic treatments like filler with Dr. Vences    Procedures Performed:   None    Follow-up: 3 month(s) in-person, or earlier for new or changing lesions    Staff Involved:   Staff/Scribe/Resident    Scribe Disclosure:   I, SHAHRZAD TIRADO, am  serving as a scribe; to document services personally performed by Eris Araujo MD -based on data collection and the provider's statements to me.     Fernanda Hyman MD  Dermatology Resident PGY4    Staff Physician Comments:   I saw and evaluated the patient with the resident and I edited the assessment and plan as documented in the note. I was present for the examination.    Provider Disclosure:   The documentation recorded by the scribe accurately reflects the services I personally performed and the decisions made by me.    Eris Araujo MD   of Dermatology  Department of Dermatology  [facility]        ____________________________________________    CC: Derm Problem (Linear morphea (en coup de sabre): stable per patient)    HPI:  Ms. Pily Mejia is a(n) 74 year old female who presents today as a return patient for ECDS check. Last seen 3m ago at which time things were going well. She had been off of methotrexate for about 3m at that point and plan was to monitor for flare in the setting of being off systemic morphea therapy. Since this visit, now 6 months off of methotrexate, she has been doing well. No new redness or spreading to the area on the forehead. She saw Dr. Vences recently who rx'd a discoloration serum. Not really using medicated topicals at present. She notes plans for cosmetic tx/filler with    Patient is otherwise feeling well, without additional skin concerns.    Labs Reviewed:  N/A    Physical Exam:  Vitals: There were no vitals taken for this visit.  SKIN: Focused examination of face was performed.  - Ill defined area of hyperpigmented macules on the L central forehead. No erythema, no tenderness, no induration  - No other lesions of concern on areas examined.     Medications:  Current Outpatient Medications   Medication Sig Dispense Refill    acetaminophen (TYLENOL) 500 MG tablet Take 1,000 mg by mouth (Patient not taking: Reported on  10/27/2023)      aspirin (ASA) 81 MG EC tablet Take 81 mg by mouth daily      Azelastine HCl 137 MCG/SPRAY SOLN   1    Calcium Carbonate Antacid 1177 MG CHEW As needed (Patient not taking: Reported on 9/22/2022)      cetirizine (ZYRTEC) 10 MG tablet Take 5 mg by mouth      cyanocobalamin (VITAMIN B-12) 2500 MCG SUBL sublingual tablet Place under the tongue once daily.      cycloSPORINE (RESTASIS) 0.05 % ophthalmic emulsion Apply 1 drop to eye      diclofenac (VOLTAREN) 1 % topical gel  (Patient not taking: Reported on 9/22/2022)      fluticasone (FLONASE) 50 MCG/ACT nasal spray Spray 1 spray in nostril (Patient not taking: Reported on 9/22/2022)      gabapentin (NEURONTIN) 100 MG capsule Take 3 capsules (300 mg) by mouth at bedtime 90 capsule 11    lidocaine (LMX4) 4 % external cream Apply topically once as needed for mild pain (Patient not taking: Reported on 11/18/2022) 45 g 1    metroNIDAZOLE (METROCREAM) 0.75 % external cream Apply topically daily (Patient not taking: Reported on 2/23/2023) 45 g 11    modafinil (PROVIGIL) 100 MG tablet Take 1 tablet (100 mg) by mouth daily 30 tablet 5    modafinil (PROVIGIL) 100 MG tablet Take 1 tablet (100 mg) by mouth daily 30 tablet 5    mometasone (ELOCON) 0.1 % external cream PLEASE SEE ATTACHED FOR DETAILED DIRECTIONS (Patient not taking: Reported on 9/22/2022)  11    pilocarpine (SALAGEN) 5 MG tablet Take 1 tablet (5 mg) by mouth 4 times daily (Patient not taking: Reported on 5/8/2024) 120 tablet 11    pimecrolimus (ELIDEL) 1 % external cream Apply topically 2 times daily 60 g 4    probiotic CAPS Take 1 capsule by mouth (Patient not taking: Reported on 11/18/2022)      VITAMIN D-VITAMIN K PO Vitamin D3 and Vitamin K2       No current facility-administered medications for this visit.      Past Medical History:   Patient Active Problem List   Diagnosis    Osteopenia    Postsurgical Acquired Absence Of Genitalia     No past medical history on file.     CC No referring  provider defined for this encounter. on close of this encounter.

## 2024-05-24 NOTE — NURSING NOTE
Dermatology Rooming Note    Pily Mejia's goals for this visit include:   Chief Complaint   Patient presents with    Derm Problem     Linear morphea (en coup de sabre): stable per patient     Tabatha Wallace LPN

## 2024-05-29 ENCOUNTER — OFFICE VISIT (OUTPATIENT)
Dept: RHEUMATOLOGY | Facility: CLINIC | Age: 74
End: 2024-05-29
Attending: INTERNAL MEDICINE
Payer: COMMERCIAL

## 2024-05-29 VITALS
HEART RATE: 62 BPM | WEIGHT: 151.3 LBS | BODY MASS INDEX: 26.81 KG/M2 | HEIGHT: 63 IN | SYSTOLIC BLOOD PRESSURE: 134 MMHG | DIASTOLIC BLOOD PRESSURE: 69 MMHG

## 2024-05-29 DIAGNOSIS — M35.00 PRIMARY SJOGREN'S SYNDROME (H): ICD-10-CM

## 2024-05-29 DIAGNOSIS — K11.9 LESION OF PAROTID GLAND: ICD-10-CM

## 2024-05-29 DIAGNOSIS — E67.3 HIGH VITAMIN D LEVEL: Primary | ICD-10-CM

## 2024-05-29 PROCEDURE — G0463 HOSPITAL OUTPT CLINIC VISIT: HCPCS | Performed by: INTERNAL MEDICINE

## 2024-05-29 PROCEDURE — 99214 OFFICE O/P EST MOD 30 MIN: CPT | Performed by: INTERNAL MEDICINE

## 2024-05-29 RX ORDER — PILOCARPINE HYDROCHLORIDE 5 MG/1
5 TABLET, FILM COATED ORAL 4 TIMES DAILY
Qty: 120 TABLET | Refills: 11 | Status: SHIPPED | OUTPATIENT
Start: 2024-05-29

## 2024-05-29 ASSESSMENT — PAIN SCALES - GENERAL: PAINLEVEL: NO PAIN (0)

## 2024-05-29 NOTE — LETTER
5/29/2024       RE: Pily Mejia  [address] [facility] [address]     Dear Colleague,    Thank you for referring your patient, Pily Mejia, to the [facility]HE[facility]OLOG[facility] [place] at [facility]. Please see a copy of my visit note below.    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    Her major complaint is extreme fatigue and pain.    She has h/o Sjogren's.    She was on HCQ [id number]. Stopped due to dizziness (not tolerable, resolved off HCQ)). It also caused hair loss (grew back off HCQ) and nausea (tolerable with eating it with meals)    Was on methotrexate, it caused renal/liver abnormality, she does not want to go back on it. Without it, her knees hurt more, has torn tendon over R hip and has pain over R hip with radiation to the R leg. It did hurt on carrying luggage, babies on the trip. Hands are swollen and hurt.    Used to be on AZA.    Skin is dry.    Eyes are dry, uses restasis.    Lung nodes in her lungs were found as part of CP work up.    Likes to take control of her Sjogren's.    Morphea is stable off methotrexate.    Has fatigue. Thinks that meds helped with fatigue.    Takes vit D with K, B, probiotic, life (collagen for joints).    Walks 6000 steps a day, during trip, more walking triggered pain.    It is hard in the morning to get up and move.    Uses voltaren gel over knees and takes tylenol.    Drinks a lot of water plus tea.    No ETOH.    Belongs to with watchers.    No other rashes, just dry spots over legs.     Gets red dots from sun exposure. Uses sun screen and sun protective clothes.    Has h/o rosacea    Has some tingling over R  leg after overdoing it.    Has dysautonomia based on testing at [facility].     Has GI sx, with certain diet gets N/V/D, sometimes has constipation. Gets bloating.    Gets canker sores, not constantly. Roof of her mouth is sensitive.    No h/o seizures.    Resatsis controls dryness of eyes.    Drinking water helps, has used fluoride.    No Raynaud's.    No persistent parotid gland swelling.    No LAP.    No cough, CP.    Gets ESOB, quit aerobics during covid pandemic, attributes it to deconditioning.    Could walk without need to stop by drinking water.    Mood is ok.    Sometimes has brain fog.      Today 5/29/2024:    -doing better, more energy, decided to hold off taking provigil for fatigue    -has not tried pilocarpine yet, mouth dryness is better by OTC, switching to green tea but she is worried about her tooth, was told that it was dying    -R hip bursitis inj at initial visit helped, was able to travel and walk around without pain    -morphea is stable off methotrexate      ROS:  A comprehensive ROS was done, positives are per HPI.    Her records were reviewed.        CT Chest w/o Contrast  Order: [id number]  Impression    1.  Solid nodule right lower lobe and groundglass nodule left lower lobe, stable on examinations dating back to 11/20/2020.  Narrative    For Patients: As a result of the 21st Century Cures Act, medical imaging exams and procedure reports are released immediately into your electronic medical record. You may view this report before your referring provider. If you have questions, please contact your health care provider.    EXAM: CT CHEST WITHOUT CONTRAST  LOCATION: [facility] MEDICAL IMAGING  DATE: 12/06/2023    INDICATION: Pulmonary nodules.  COMPARISON: 08/29/2022. 02/04/2021. 11/20/2020.  TECHNIQUE: CT chest without IV contrast. Multiplanar reformats were obtained. Dose reduction techniques were used.  CONTRAST: None.    FINDINGS:  LUNGS AND PLEURA:    Right lung: 7 x 5 mm nodule periphery of the  right lower lobe medially series 4 image 168, stable. No other nodules in the right lung.    Left lung: Groundglass nodule left lower lobe series 4 image 110 measuring approximately 6 mm, stable. Tiny granuloma left lower lobe.    No new nodules.    MEDIASTINUM/AXILLAE: 1.8 cm nodule inferior left lobe thyroid, stable. No adenopathy.    CORONARY ARTERY CALCIFICATION: None.    UPPER ABDOMEN: No significant finding.    MUSCULOSKELETAL: Unremarkable.      US Parotid Submandibular Glands  Order: [id number]  Impression    The parotid and submandibular glands on each side demonstrate normal  sonographic appearance.  Narrative    EXAM: US PAROTID SUBMANDIBULAR GLANDS    COMPARISON: None.    FINDINGS: The submandibular and parotid glands on each side demonstrate normal  sonographic appearance. The glandular parenchyma is homogeneous.  A 0.6 cm in the greatest dimension hypoechoic nodule in the deep left parotid  gland likely represents a tiny intraglandular lymph node. No other mass is  identified.  Exam End: 12/21/21  3:22 PM    Specimen Collected: 12/21/21  3:27 PM Last Resulted: 12/21/21  3:31 PM   Received From: [facility]  Result Received: 05/09/23  1:30 PM      EXAM: US THYROID/PARATHYROID  LOCATION: [facility]  DATE/TIME: 8/21/2020 3:10 PM    INDICATION: Thyroid Cyst  COMPARISON: CT chest 08/14/2020  TECHNIQUE: Thyroid ultrasound.    FINDINGS:  RIGHT lobe: 1.5 x 1.1 x 1.5 cm. Homogeneous echotexture. Tiny 3 mm cyst is noted  within the interpolar region of the right lobe of the thyroid.  Isthmus: 2 mm.  LEFT lobe: 5 x 0.9 x 1.4 cm. Homogeneous echotexture.    NECK: No cervical lymphadenopathy. 3.2 x 2.1 x 1.8 cm cyst is noted inferior to  the left lobe of the thyroid.    NODULES:    IMPRESSION:  1.  3.2 x 2.1 x 1.8 cm cyst is noted inferior to the left lobe of the thyroid.      Nodules are characterized per  ACR Thyroid Imaging, Reporting and Data System (TI-RADS): White Paper of the ACR  TI-RADS  Committee  Jeremy Bedoya et al. Journal of the American College of Radiology 2017.  Volume 14 (2017), Issue 5, 101-768.  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 SHe thinks that is since childhood.  - Other autonomic reflex screen was minimally abnormal. Isolated finding of focal reduced sweat response to the proximal leg with normal cardiovagal and adrenergic reflexes, which was of uncertain significance.   - Brain MRI wnl.    Rosacea     Osteopenia      S/p Hysterectomy with bilateral salpingo-oophorectomy for endometriosis, age 36.    Chronic stable ground glass opacities rt lower lobe     Hx of vitamin D deficiency     Diminished thyroid uptake on left compared to rt on scan         Past Surgical History:   Procedure Laterality Date    HYSTERECTOMY  1985    OOPHORECTOMY       Family History   Problem Relation Age of Onset    Breast Cancer No family hx of    No FHx of diagnosed autoimmune conditions    Social History     Socioeconomic History    Marital status:      Spouse name: Not on file    Number of children: Not on file    Years of education: Not on file    Highest education level: Not on file   Occupational History    Not on file   Tobacco Use    Smoking status: Never    Smokeless tobacco: Never   Substance and Sexual Activity    Alcohol use: Not on file    Drug use: Not on file    Sexual activity: Not on file   Other Topics Concern    Not on file   Social History Narrative    Not on file     Social Determinants of Health     Financial Resource Strain: Low Risk  (3/8/2022)     Received from [facility] [employer] [facility], [facility]    Financial Resource Strain     Difficulty of Paying Living Expenses: 3     Difficulty of Paying Living Expenses: Not on file   Food Insecurity: No Food Insecurity (3/8/2022)    Received from [facility] [employer] [facility], [facility]    Food Insecurity     Worried About Running Out of Food in the Last Year: 1   Transportation Needs: No Transportation Needs (3/8/2022)    Received from [facility] [employer] [facility], [facility]    Transportation Needs     Lack of Transportation (Medical): 1   Physical Activity: Insufficiently Active (1/13/2022)    Received from [facility]    Exercise Vital Sign     Days of Exercise per Week: 2 days     Minutes of Exercise per Session: 30 min   Stress: No Stress Concern Present (1/13/2022)    Received from [facility]    [employer] - Occupational Stress Questionnaire     Feeling of Stress : Only a little   Social Connections: Unknown (3/16/2023)    Received from [facility] [employer] [facility], [facility]    Social Connections     Frequency of Communication with Friends and Family: Not on file   Interpersonal Safety: Not At Risk (1/13/2022)    Received from [facility]    Humiliation, Afraid, Rape, and Kick questionnaire     Fear of Current or Ex-Partner: No     Emotionally Abused: No     Physically Abused: No     Sexually Abused: No   Housing Stability: Low Risk  (3/8/2022)    Received from [facility] [employer] [facility], [facility]    Housing Stability     Unable to Pay for Housing in the Last Year: 1     Patient Active Problem List   Diagnosis    Osteopenia    Postsurgical Acquired Absence Of Genitalia     Allergies   Allergen Reactions    Codeine     Fish  Oil GI Disturbance    Hydroxychloroquine Other (See Comments) and GI Disturbance    Milk (Cow) GI Disturbance    No Clinical Screening - See Comments Other (See Comments)     Triminycine per patient    Penicillins     Soybean Oil GI Disturbance       Outpatient Encounter Medications as of 5/29/2024   Medication Sig Dispense Refill    aspirin (ASA) 81 MG EC tablet Take 81 mg by mouth daily      Azelastine HCl 137 MCG/SPRAY SOLN   1    cetirizine (ZYRTEC) 10 MG tablet Take 5 mg by mouth      cyanocobalamin (VITAMIN B-12) 2500 MCG SUBL sublingual tablet Place under the tongue once daily.      cycloSPORINE (RESTASIS) 0.05 % ophthalmic emulsion Apply 1 drop to eye      diclofenac (VOLTAREN) 1 % topical gel       fluticasone (FLONASE) 50 MCG/ACT nasal spray Spray 1 spray in nostril      gabapentin (NEURONTIN) 100 MG capsule Take 3 capsules (300 mg) by mouth at bedtime 90 capsule 11    lidocaine (LMX4) 4 % external cream Apply topically once as needed for mild pain 45 g 1    metroNIDAZOLE (METROCREAM) 0.75 % external cream Apply topically daily 45 g 11    modafinil (PROVIGIL) 100 MG tablet Take 1 tablet (100 mg) by mouth daily 30 tablet 5    modafinil (PROVIGIL) 100 MG tablet Take 1 tablet (100 mg) by mouth daily 30 tablet 5    pilocarpine (SALAGEN) 5 MG tablet Take 1 tablet (5 mg) by mouth 4 times daily 120 tablet 11    pimecrolimus (ELIDEL) 1 % external cream Apply topically 2 times daily 60 g 4    VITAMIN D-VITAMIN K PO Vitamin D3 and Vitamin K2      acetaminophen (TYLENOL) 500 MG tablet Take 1,000 mg by mouth (Patient not taking: Reported on 10/27/2023)      Calcium Carbonate Antacid 1177 MG CHEW As needed (Patient not taking: Reported on 9/22/2022)      mometasone (ELOCON) 0.1 % external cream PLEASE SEE ATTACHED FOR DETAILED DIRECTIONS (Patient not taking: Reported on 9/22/2022)  11    probiotic CAPS Take 1 capsule by mouth (Patient not taking: Reported on 11/18/2022)      [DISCONTINUED] pilocarpine (SALAGEN) 5 MG  "tablet Take 1 tablet (5 mg) by mouth 4 times daily (Patient not taking: Reported on 5/8/2024) 120 tablet 11     No facility-administered encounter medications on file as of 5/29/2024.             Ph.E:    /69   Pulse 62   Ht 1.6 m (5' 3\")   Wt 68.6 kg (151 lb 4.8 oz)   BMI 26.80 kg/m        Constitutional: WD/WN. Pleasant. In no acute distress.  present.  Eyes: EOM intact, sclera anicteric, conj not injected  HEENT: No oral ulcers or thrush. Poor salivary pool. No parotid gland enlargement.  Neck: No cervical LAP or thyromegaly  Chest: Clear to auscultation bilaterally  CV: RRR, no murmurs/ rubs or gallops. No edema, clubbing or cyanosis.   GI: Abdomen is soft and non tender.   MS: No synovitis. Cool joints. OA changes of hands  Skin: morphea over forehead (unchanged, stable)  Neuro: A&O x 3. Grossly non focal  Psych: NL affect    Assessment/ plan:      1. Sjogren's. Discussed Dx, Mx. Reviewed labs. Fatigue is better, could hold off trying provigil. Was advised to try pilocarpine for dental health; risks were discussed.    2. R hip bursitis. Injected 2/2024, pain resolved. Was advised to do stretching exercises.    3. Thyroid nodule. Neck US showed smaller nodule with no need for FNA or follow up.    4. Parotid gland cyst. US showed cysts, LNs. Will refer to ENT for long term follow up.    5. Morphea. Failed AZA, did not tolerate HCQ (dizziness, hair loss) or methotrexate (liver abnormality). Stable off methotrexate. Recommend follow up with rheum-derm, cellcept could be an option in future if it gets worse. LFTs are normal now off methotrexate    6. Borderline positive beta 2 GP I IgA. Unclear significance, will re-check, no need for anti-coagulation. Rest of APLA were negative.    7. High vit D 75, but cutting back on vit D caused leg cramps, she is going to try taking more, will re-check vit D to make sure it does not go very high    Plan:        Go up on vit D to 5 tabs of 5000 units a " week    Re-check vit D, beta 2 GPI IgA in 3 months    Refer to ENT    Try pilocarpine    Hip bursitis exercises    Return in 6 months in person    Orders Placed This Encounter   Procedures    Vitamin D Deficiency    Beta 2 Glycoprotein 1 Antibody IgA       Annabel Parnell MD

## 2024-05-29 NOTE — PROGRESS NOTES
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    Her major complaint is extreme fatigue and pain.    She has h/o Sjogren's.    She was on HCQ [id number]. Stopped due to dizziness (not tolerable, resolved off HCQ)). It also caused hair loss (grew back off HCQ) and nausea (tolerable with eating it with meals)    Was on methotrexate, it caused renal/liver abnormality, she does not want to go back on it. Without it, her knees hurt more, has torn tendon over R hip and has pain over R hip with radiation to the R leg. It did hurt on carrying luggage, babies on the trip. Hands are swollen and hurt.    Used to be on AZA.    Skin is dry.    Eyes are dry, uses restasis.    Lung nodes in her lungs were found as part of CP work up.    Likes to take control of her Sjogren's.    Morphea is stable off methotrexate.    Has fatigue. Thinks that meds helped with fatigue.    Takes vit D with K, B, probiotic, life (collagen for joints).    Walks 6000 steps a day, during trip, more walking triggered pain.    It is hard in the morning to get up and move.    Uses voltaren gel over knees and takes tylenol.    Drinks a lot of water plus tea.    No ETOH.    Belongs to with watchers.    No other rashes, just dry spots over legs.     Gets red dots from sun exposure. Uses sun screen and sun protective clothes.    Has h/o rosacea    Has some tingling over R leg after overdoing it.    Has dysautonomia based on testing at [place].     Has GI sx, with certain diet gets N/V/D, sometimes has constipation. Gets bloating.    Gets canker sores, not constantly. Roof of her mouth is sensitive.    No h/o seizures.    Resatsis controls dryness of eyes.    Drinking water helps, has used  fluoride.    No Raynaud's.    No persistent parotid gland swelling.    No LAP.    No cough, CP.    Gets ESOB, quit aerobics during covid pandemic, attributes it to deconditioning.    Could walk without need to stop by drinking water.    Mood is ok.    Sometimes has brain fog.      Today 5/29/2024:    -doing better, more energy, decided to hold off taking provigil for fatigue    -has not tried pilocarpine yet, mouth dryness is better by OTC, switching to green tea but she is worried about her tooth, was told that it was dying    -R hip bursitis inj at initial visit helped, was able to travel and walk around without pain    -morphea is stable off methotrexate      ROS:  A comprehensive ROS was done, positives are per HPI.    Her records were reviewed.        CT Chest w/o Contrast  Order: [id number]  Impression    1.  Solid nodule right lower lobe and groundglass nodule left lower lobe, stable on examinations dating back to 11/20/2020.  Narrative    For Patients: As a result of the 21st Century Cures Act, medical imaging exams and procedure reports are released immediately into your electronic medical record. You may view this report before your referring provider. If you have questions, please contact your health care provider.    EXAM: CT CHEST WITHOUT CONTRAST  LOCATION: [facility] MEDICAL IMAGING  DATE: 12/06/2023    INDICATION: Pulmonary nodules.  COMPARISON: 08/29/2022. 02/04/2021. 11/20/2020.  TECHNIQUE: CT chest without IV contrast. Multiplanar reformats were obtained. Dose reduction techniques were used.  CONTRAST: None.    FINDINGS:  LUNGS AND PLEURA:    Right lung: 7 x 5 mm nodule periphery of the right lower lobe medially series 4 image 168, stable. No other nodules in the right lung.    Left lung: Groundglass nodule left lower lobe series 4 image 110 measuring approximately 6 mm, stable. Tiny granuloma left lower lobe.    No new nodules.    MEDIASTINUM/AXILLAE: 1.8 cm nodule inferior left lobe thyroid, stable. No  adenopathy.    CORONARY ARTERY CALCIFICATION: None.    UPPER ABDOMEN: No significant finding.    MUSCULOSKELETAL: Unremarkable.      US Parotid Submandibular Glands  Order: [id number]  Impression    The parotid and submandibular glands on each side demonstrate normal  sonographic appearance.  Narrative    EXAM: US PAROTID SUBMANDIBULAR GLANDS    COMPARISON: None.    FINDINGS: The submandibular and parotid glands on each side demonstrate normal  sonographic appearance. The glandular parenchyma is homogeneous.  A 0.6 cm in the greatest dimension hypoechoic nodule in the deep left parotid  gland likely represents a tiny intraglandular lymph node. No other mass is  identified.  Exam End: 12/21/21  3:22 PM    Specimen Collected: 12/21/21  3:27 PM Last Resulted: 12/21/21  3:31 PM   Received From: [facility]  Result Received: 05/09/23  1:30 PM      EXAM: US THYROID/PARATHYROID  LOCATION: [facility]  DATE/TIME: 8/21/2020 3:10 PM    INDICATION: Thyroid Cyst  COMPARISON: CT chest 08/14/2020  TECHNIQUE: Thyroid ultrasound.    FINDINGS:  RIGHT lobe: 1.5 x 1.1 x 1.5 cm. Homogeneous echotexture. Tiny 3 mm cyst is noted  within the interpolar region of the right lobe of the thyroid.  Isthmus: 2 mm.  LEFT lobe: 5 x 0.9 x 1.4 cm. Homogeneous echotexture.    NECK: No cervical lymphadenopathy. 3.2 x 2.1 x 1.8 cm cyst is noted inferior to  the left lobe of the thyroid.    NODULES:    IMPRESSION:  1.  3.2 x 2.1 x 1.8 cm cyst is noted inferior to the left lobe of the thyroid.      Nodules are characterized per  ACR Thyroid Imaging, Reporting and Data System (TI-RADS): White Paper of the ACR  TI-RADS Committee  Jeremy Bedoya et al. Journal of the American College of Radiology 2017.  Volume 14 (2017), Issue 5, 637-275.  Exam End: 08/21/20  3:10 PM    Specimen Collected: 08/21/20  3:10 PM Last Resulted: 08/21/20  3:29 PM   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 SHe thinks that is since childhood.  - Other autonomic reflex screen was minimally abnormal. Isolated finding of focal reduced sweat response to the proximal leg with normal cardiovagal and adrenergic reflexes, which was of uncertain significance.   - Brain MRI wnl.    Rosacea     Osteopenia      S/p Hysterectomy with bilateral salpingo-oophorectomy for endometriosis, age 36.    Chronic stable ground glass opacities rt lower lobe     Hx of vitamin D deficiency     Diminished thyroid uptake on left compared to rt on scan         Past Surgical History:   Procedure Laterality Date    HYSTERECTOMY      OOPHORECTOMY       Family History   Problem Relation Age of Onset    Breast Cancer No family hx of    No FHx of diagnosed autoimmune conditions    Social History     Socioeconomic History    Marital status:      Spouse name: Not on file    Number of children: Not on file    Years of education: Not on file    Highest education level: Not on file   Occupational History    Not on file   Tobacco Use    Smoking status: Never    Smokeless tobacco: Never   Substance and Sexual Activity    Alcohol use: Not on file    Drug use: Not on file    Sexual activity: Not on file   Other Topics Concern    Not on file   Social History Narrative    Not on file     Social Determinants of Health     Financial Resource Strain: Low Risk  (3/8/2022)    [facility]    Financial Resource Strain     Difficulty of Paying Living Expenses: 3     Difficulty of Paying Living Expenses: Not on file   Food Insecurity: No Food Insecurity (3/8/2022)    [facility]    Food Insecurity     Worried About Running Out of Food in the Last Year: 1   Transportation Needs: No Transportation Needs (3/8/2022)    Received from [facility] [employer] [facility] [employer] [facility], [facility]    Transportation Needs     Lack of Transportation (Medical): 1   Physical Activity: Insufficiently Active (1/13/2022)    Received from [facility]    Exercise Vital Sign     Days of Exercise per Week: 2 days     Minutes of Exercise per Session: 30 min   Stress: No Stress Concern Present (1/13/2022)    Received from [facility]    [employer] [place] [employer] - Occupational Stress Questionnaire     Feeling of Stress : Only a little   Social Connections: Unknown (3/16/2023)    Received from [facility], [facility]    Social Connections     Frequency of Communication with Friends and Family: Not on file   Interpersonal Safety: Not At Risk (1/13/2022)    Received from [facility]    Humiliation, Afraid, Rape, and Kick questionnaire     Fear of Current or Ex-Partner: No     Emotionally Abused: No     Physically Abused: No     Sexually Abused: No   Housing Stability: Low Risk  (3/8/2022)    Received from [facility] [facility] [employer] [facility], [facility]    Housing Stability     Unable to Pay for Housing in the Last Year: 1     Patient Active Problem List   Diagnosis    Osteopenia    Postsurgical Acquired Absence Of Genitalia     Allergies   Allergen Reactions    Codeine     Fish Oil GI Disturbance    Hydroxychloroquine Other (See Comments) and GI Disturbance    Milk (Cow) GI Disturbance    No Clinical Screening - See Comments Other (See Comments)     Triminycine per patient    Penicillins     Soybean Oil GI Disturbance       Outpatient Encounter Medications as of 5/29/2024   Medication Sig Dispense  "Refill    aspirin (ASA) 81 MG EC tablet Take 81 mg by mouth daily      Azelastine HCl 137 MCG/SPRAY SOLN   1    cetirizine (ZYRTEC) 10 MG tablet Take 5 mg by mouth      cyanocobalamin (VITAMIN B-12) 2500 MCG SUBL sublingual tablet Place under the tongue once daily.      cycloSPORINE (RESTASIS) 0.05 % ophthalmic emulsion Apply 1 drop to eye      diclofenac (VOLTAREN) 1 % topical gel       fluticasone (FLONASE) 50 MCG/ACT nasal spray Spray 1 spray in nostril      gabapentin (NEURONTIN) 100 MG capsule Take 3 capsules (300 mg) by mouth at bedtime 90 capsule 11    lidocaine (LMX4) 4 % external cream Apply topically once as needed for mild pain 45 g 1    metroNIDAZOLE (METROCREAM) 0.75 % external cream Apply topically daily 45 g 11    modafinil (PROVIGIL) 100 MG tablet Take 1 tablet (100 mg) by mouth daily 30 tablet 5    modafinil (PROVIGIL) 100 MG tablet Take 1 tablet (100 mg) by mouth daily 30 tablet 5    pilocarpine (SALAGEN) 5 MG tablet Take 1 tablet (5 mg) by mouth 4 times daily 120 tablet 11    pimecrolimus (ELIDEL) 1 % external cream Apply topically 2 times daily 60 g 4    VITAMIN D-VITAMIN K PO Vitamin D3 and Vitamin K2      acetaminophen (TYLENOL) 500 MG tablet Take 1,000 mg by mouth (Patient not taking: Reported on 10/27/2023)      Calcium Carbonate Antacid 1177 MG CHEW As needed (Patient not taking: Reported on 9/22/2022)      mometasone (ELOCON) 0.1 % external cream PLEASE SEE ATTACHED FOR DETAILED DIRECTIONS (Patient not taking: Reported on 9/22/2022)  11    probiotic CAPS Take 1 capsule by mouth (Patient not taking: Reported on 11/18/2022)      [DISCONTINUED] pilocarpine (SALAGEN) 5 MG tablet Take 1 tablet (5 mg) by mouth 4 times daily (Patient not taking: Reported on 5/8/2024) 120 tablet 11     No facility-administered encounter medications on file as of 5/29/2024.             Ph.E:    /69   Pulse 62   Ht 1.6 m (5' 3\")   Wt 68.6 kg (151 lb 4.8 oz)   BMI 26.80 kg/m        Constitutional: WD/WN. " Pleasant. In no acute distress.  present.  Eyes: EOM intact, sclera anicteric, conj not injected  HEENT: No oral ulcers or thrush. Poor salivary pool. No parotid gland enlargement.  Neck: No cervical LAP or thyromegaly  Chest: Clear to auscultation bilaterally  CV: RRR, no murmurs/ rubs or gallops. No edema, clubbing or cyanosis.   GI: Abdomen is soft and non tender.   MS: No synovitis. Cool joints. OA changes of hands  Skin: morphea over forehead (unchanged, stable)  Neuro: A&O x 3. Grossly non focal  Psych: NL affect    Assessment/ plan:      1. Sjogren's. Discussed Dx, Mx. Reviewed labs. Fatigue is better, could hold off trying provigil. Was advised to try pilocarpine for dental health; risks were discussed.    2. R hip bursitis. Injected 2/2024, pain resolved. Was advised to do stretching exercises.    3. Thyroid nodule. Neck US showed smaller nodule with no need for FNA or follow up.    4. Parotid gland cyst. US showed cysts, LNs. Will refer to ENT for long term follow up.    5. Morphea. Failed AZA, did not tolerate HCQ (dizziness, hair loss) or methotrexate (liver abnormality). Stable off methotrexate. Recommend follow up with rheum-derm, cellcept could be an option in future if it gets worse. LFTs are normal now off methotrexate    6. Borderline positive beta 2 GP I IgA. Unclear significance, will re-check, no need for anti-coagulation. Rest of APLA were negative.    7. High vit D 75, but cutting back on vit D caused leg cramps, she is going to try taking more, will re-check vit D to make sure it does not go very high    Plan:        Go up on vit D to 5 tabs of 5000 units a week    Re-check vit D, beta 2 GPI IgA in 3 months    Refer to ENT    Try pilocarpine    Hip bursitis exercises    Return in 6 months in person    Orders Placed This Encounter   Procedures    Vitamin D Deficiency    Beta 2 Glycoprotein 1 Antibody IgA       Annabel Parnell MD

## 2024-05-29 NOTE — PATIENT INSTRUCTIONS
Go up on vit D to 5 tabs of 5000 units a week    Re-check vit D, beta 2 GPI IgA in 3 months    Refer to ENT    Try pilocarpine    Hip bursitis exercises    Return in 6 months in person

## 2024-05-29 NOTE — NURSING NOTE
"Chief Complaint   Patient presents with    RECHECK     Follow up with sjogrens     /69   Pulse 62   Ht 1.6 m (5' 3\")   Wt 68.6 kg (151 lb 4.8 oz)   BMI 26.80 kg/m    Sharmin Renee, Lead CMA  5/29/2024 11:01 AM    "

## 2024-06-10 ENCOUNTER — TELEPHONE (OUTPATIENT)
Dept: DERMATOLOGY | Facility: CLINIC | Age: 74
End: 2024-06-10
Payer: COMMERCIAL

## 2024-06-10 NOTE — TELEPHONE ENCOUNTER
You have two back-to-back appointments currently scheduled with Dr. Vences on 8/28/24.  Due to changes to the provider's schedule we need to reschedule your appointment.      Please use your MyChart or call [phone] to reschedule this appointment at your earliest convenience.    We apologize for any inconvenience this may cause, and thank you for trusting [facility] with your care.    Sincerely, [facility]

## 2024-06-10 NOTE — TELEPHONE ENCOUNTER
Patient confirmed rescheduled appointment:     Date: 9/11  Time: 12:15   Visit type: Return dermatology  Provider: Dr. Vences  Location: [facility]  Additonal Notes: Patient asked to cancel the 8/28 Cosmetic Return appointment.

## 2024-06-19 ENCOUNTER — TELEPHONE (OUTPATIENT)
Dept: OTOLARYNGOLOGY | Facility: CLINIC | Age: 74
End: 2024-06-19
Payer: COMMERCIAL

## 2024-06-22 NOTE — PATIENT INSTRUCTIONS
EMERGENCY DEPARTMENT HISTORY AND PHYSICAL EXAM     ----------------------------------------------------------------------------  Please note that this dictation was completed with [employer], the computer voice recognition software.  Quite often unanticipated grammatical, syntax, homophones, and other interpretive errors are inadvertently transcribed by the computer software.  Please disregard these errors.  Please excuse any errors that have escaped final proofreading  ----------------------------------------------------------------------------      Date: 6/22/2024  Patient Name: Jesus Manuel Nova      HISTORY OF PRESENT ILLNESS     Chief Complaint   Patient presents with    Dysuria     Pt ambulatory into triage with complaints of dysuria since this morning. Was last able to urinate an hour ago. Patient is also complaining of lower back pain starting this morning as well.        History obtainted from:  Patient    Other independent source of history: none    HPI: Jesus Manuel Nova is a 49 y.o. male, without significant pmhx, who presents via private vehicle to the ED with c/o painful urination and lower back pain since this am. Noted to urinate 1 hour prior to presenting to the ED.      PCP: Wicho Odell MD    Allergy List:   No Known Allergies      CURRENT MEDICATIONS      Discharge Medication List as of 6/22/2024  5:19 AM            PAST HISTORY       Past Medical History:  No past medical history on file.    Past Surgical History:  No past surgical history on file.    Family History:  No family history on file.    Social History:       Allergies:  No Known Allergies    Records Review:  I reviewed and interpreted the nursing notes and and vital signs from today's visit, as well as the electronic medical record system for any external medical records that were available that may contribute to the patients current condition, including independent interpretation of previous orthopedic evaluation for rotator cuff  US of parotid/minor salivary gland and US thyroid    Labs today    R hip trochanteric bursitis shot today    Try provigil for fatigue    Try pilocarpine for dry mouth    Return video or in person in about 3 months   tablet  Commonly known as: TORADOL  Take 1 tablet by mouth every 6 hours as needed for Pain     ondansetron 4 MG disintegrating tablet  Commonly known as: ZOFRAN-ODT  Place 1 tablet under the tongue every 8 hours as needed for Nausea or Vomiting May Sub regular tablet (non-ODT) if insurance does not cover ODT.     oxyCODONE-acetaminophen 5-325 MG per tablet  Commonly known as: Percocet  Take 1 tablet by mouth every 6 hours as needed for Pain for up to 3 days. Intended supply: 3 days. Take lowest dose possible to manage pain Max Daily Amount: 4 tablets     tamsulosin 0.4 MG capsule  Commonly known as: FLOMAX  Take 1 capsule by mouth daily for 5 days               Where to Get Your Medications        These medications were sent to [id number] [address] [facility] - P [phone] - F [phone] [address] [facility] [address]      Phone: [phone]   ketorolac 10 MG tablet  ondansetron 4 MG disintegrating tablet  oxyCODONE-acetaminophen 5-325 MG per tablet  tamsulosin 0.4 MG capsule           DISCONTINUED MEDICATIONS:  Discharge Medication List as of 6/22/2024  5:19 AM          I am the Primary Clinician of Record.   Brittani Carlson MD (electronically signed)    (Please note that parts of this dictation were completed with voice recognition software. Quite often unanticipated grammatical, syntax, homophones, and other interpretive errors are inadvertently transcribed by the computer software. Please disregards these errors. Please excuse any errors that have escaped final proofreading.)           Brittani Carlson MD  06/22/24 [id number]

## 2024-06-26 ENCOUNTER — HOSPITAL ENCOUNTER (OUTPATIENT)
Dept: MAMMOGRAPHY | Facility: CLINIC | Age: 74
Discharge: HOME | End: 2024-06-26
Admitting: INTERNAL MEDICINE
Payer: COMMERCIAL

## 2024-06-26 DIAGNOSIS — Z12.31 VISIT FOR SCREENING MAMMOGRAM: ICD-10-CM

## 2024-06-26 PROCEDURE — 77063 BREAST TOMOSYNTHESIS BI: CPT

## 2024-07-09 ENCOUNTER — TELEPHONE (OUTPATIENT)
Dept: DERMATOLOGY | Facility: CLINIC | Age: 74
End: 2024-07-09
Payer: COMMERCIAL

## 2024-07-09 NOTE — TELEPHONE ENCOUNTER
Patient Contacted and schedule the following:    Appointment type: Return  Provider: Dr. Vences  Return date: 10/16/24  Specialty phone number: [phone]

## 2024-08-15 ENCOUNTER — LAB (OUTPATIENT)
Dept: LAB | Facility: CLINIC | Age: 74
End: 2024-08-15
Payer: COMMERCIAL

## 2024-08-15 DIAGNOSIS — D84.9 IMMUNOSUPPRESSION (H): ICD-10-CM

## 2024-08-15 DIAGNOSIS — M35.00 PRIMARY SJOGREN'S SYNDROME (H): ICD-10-CM

## 2024-08-15 DIAGNOSIS — E67.3 HIGH VITAMIN D LEVEL: ICD-10-CM

## 2024-08-15 LAB
ALBUMIN SERPL BCG-MCNC: 4.1 G/DL (ref 3.5–5.2)
ALP SERPL-CCNC: 122 U/L (ref 40–150)
ALT SERPL W P-5'-P-CCNC: 16 U/L (ref 0–50)
ANION GAP SERPL CALCULATED.3IONS-SCNC: 9 MMOL/L (ref 7–15)
AST SERPL W P-5'-P-CCNC: 27 U/L (ref 0–45)
BASOPHILS # BLD AUTO: 0 10E3/UL (ref 0–0.2)
BASOPHILS NFR BLD AUTO: 0 %
BILIRUB SERPL-MCNC: 0.3 MG/DL
BUN SERPL-MCNC: 13.9 MG/DL (ref 8–23)
CALCIUM SERPL-MCNC: 9.4 MG/DL (ref 8.8–10.4)
CHLORIDE SERPL-SCNC: 104 MMOL/L (ref 98–107)
CHOLEST SERPL-MCNC: 197 MG/DL
CREAT SERPL-MCNC: 0.89 MG/DL (ref 0.51–0.95)
EGFRCR SERPLBLD CKD-EPI 2021: 68 ML/MIN/1.73M2
EOSINOPHIL # BLD AUTO: 0.1 10E3/UL (ref 0–0.7)
EOSINOPHIL NFR BLD AUTO: 3 %
ERYTHROCYTE [DISTWIDTH] IN BLOOD BY AUTOMATED COUNT: 12.5 % (ref 10–15)
GLUCOSE SERPL-MCNC: 101 MG/DL (ref 70–99)
HCO3 SERPL-SCNC: 26 MMOL/L (ref 22–29)
HCT VFR BLD AUTO: 44.1 % (ref 35–47)
HDLC SERPL-MCNC: 54 MG/DL
HGB BLD-MCNC: 14.8 G/DL (ref 11.7–15.7)
IMM GRANULOCYTES # BLD: 0 10E3/UL
IMM GRANULOCYTES NFR BLD: 0 %
LDLC SERPL CALC-MCNC: 121 MG/DL
LYMPHOCYTES # BLD AUTO: 1.7 10E3/UL (ref 0.8–5.3)
LYMPHOCYTES NFR BLD AUTO: 36 %
MCH RBC QN AUTO: 30.5 PG (ref 26.5–33)
MCHC RBC AUTO-ENTMCNC: 33.6 G/DL (ref 31.5–36.5)
MCV RBC AUTO: 91 FL (ref 78–100)
MONOCYTES # BLD AUTO: 0.3 10E3/UL (ref 0–1.3)
MONOCYTES NFR BLD AUTO: 6 %
NEUTROPHILS # BLD AUTO: 2.6 10E3/UL (ref 1.6–8.3)
NEUTROPHILS NFR BLD AUTO: 55 %
NONHDLC SERPL-MCNC: 143 MG/DL
PLATELET # BLD AUTO: 267 10E3/UL (ref 150–450)
POTASSIUM SERPL-SCNC: 4.6 MMOL/L (ref 3.4–5.3)
PROT SERPL-MCNC: 7 G/DL (ref 6.4–8.3)
RBC # BLD AUTO: 4.85 10E6/UL (ref 3.8–5.2)
SODIUM SERPL-SCNC: 139 MMOL/L (ref 135–145)
TRIGL SERPL-MCNC: 110 MG/DL
VIT D+METAB SERPL-MCNC: 73 NG/ML (ref 20–50)
WBC # BLD AUTO: 4.7 10E3/UL (ref 4–11)

## 2024-08-15 PROCEDURE — 82306 VITAMIN D 25 HYDROXY: CPT | Mod: GZ

## 2024-08-15 PROCEDURE — 80061 LIPID PANEL: CPT | Mod: GZ

## 2024-08-15 PROCEDURE — 80053 COMPREHEN METABOLIC PANEL: CPT

## 2024-08-15 PROCEDURE — 86146 BETA-2 GLYCOPROTEIN ANTIBODY: CPT

## 2024-08-15 PROCEDURE — 36415 COLL VENOUS BLD VENIPUNCTURE: CPT

## 2024-08-15 PROCEDURE — 85025 COMPLETE CBC W/AUTO DIFF WBC: CPT

## 2024-08-16 LAB — B2 GLYCOPROT1 IGA SER-ACNC: 6.8 U/ML

## 2024-08-21 NOTE — TELEPHONE ENCOUNTER
FUTURE VISIT INFORMATION      FUTURE VISIT INFORMATION:  Date: 9/10/24  Time: 9:15Am  Location: [facility]  REFERRAL INFORMATION:  Referring provider:  Annabel aPrnell MD   Referring providers clinic:  [facility] Rheumatology  Reason for visit/diagnosis  Lesion of parotid gland, apt per Pt, [place] verified     RECORDS REQUESTED FROM:       Clinic name Comments Records Status Imaging Status   [facility] Rheumatology  5/29/24- Ov Annabel Zimmerman MD  Epic     Imaging  2/29/24- Us Thyroid + Us head neck  Epic  PACS

## 2024-09-10 ENCOUNTER — OFFICE VISIT (OUTPATIENT)
Dept: OTOLARYNGOLOGY | Facility: CLINIC | Age: 74
End: 2024-09-10
Attending: INTERNAL MEDICINE
Payer: COMMERCIAL

## 2024-09-10 ENCOUNTER — PRE VISIT (OUTPATIENT)
Dept: OTOLARYNGOLOGY | Facility: CLINIC | Age: 74
End: 2024-09-10

## 2024-09-10 VITALS
SYSTOLIC BLOOD PRESSURE: 131 MMHG | HEART RATE: 62 BPM | OXYGEN SATURATION: 94 % | HEIGHT: 63 IN | BODY MASS INDEX: 26.63 KG/M2 | DIASTOLIC BLOOD PRESSURE: 72 MMHG | WEIGHT: 150.3 LBS

## 2024-09-10 DIAGNOSIS — M35.00 PRIMARY SJOGREN'S SYNDROME (H): ICD-10-CM

## 2024-09-10 DIAGNOSIS — K11.9 LESION OF PAROTID GLAND: ICD-10-CM

## 2024-09-10 PROCEDURE — 99203 OFFICE O/P NEW LOW 30 MIN: CPT | Performed by: OTOLARYNGOLOGY

## 2024-09-10 RX ORDER — OXYMETAZOLINE HYDROCHLORIDE 0.05 G/100ML
2 SPRAY NASAL 2 TIMES DAILY
COMMUNITY

## 2024-09-10 ASSESSMENT — PAIN SCALES - GENERAL: PAINLEVEL: NO PAIN (0)

## 2024-09-10 NOTE — PATIENT INSTRUCTIONS
You were seen in the ENT Clinic today by Dr. Jimenez. If you have any questions or concerns after your appointment, please contact us (see below)       2.   The following recommendations have been made based upon your appointment today:   -CT neck       3.   Plan to return to the ENT clinic after CT scan            How to Contact Us:  Send a [employer] message to your provider. Our team will respond to you via [employer]. Occasionally, we will need to call you to get further information.  For urgent matters (Monday-Friday), call the ENT Clinic: [phone] and speak with a call center team member - they will route your call appropriately.   If you'd like to speak directly with a nurse, please find our contact information below. We do our best to check voicemail frequently throughout the day, and will work to call you back within 1-2 days. For urgent matters, please use the general clinic phone numbers listed above.     TIERNEY Triplett  Direct: [phone]  Riya CORLEY LPN  Direct: [phone]         [facility]  Department of Otolaryngology

## 2024-09-10 NOTE — LETTER
9/10/2024       RE: Pily Mejia  [address] [address]     Dear Colleague,    Thank you for referring your patient, Pily Mejia, to the [facility] EAR NOSE AND THROAT CLINIC [place] at [facility]. Please see a copy of my visit note below.      Otolaryngology Clinic      Name: Pily Mejia  MRN: [id number]  Age: 74 year old  : 1950  Referring provider: Annabel Parnell  09/10/2024      Chief Complaint:  Consultation    History of Present Illness:   Pily Mejia is a 74 year old female accompanied by her  with a history of Sjogren's syndrome on immunosuppression with CsA (monitored by rheumatologist/referring provider Dr. Parnell) who presents for consultation regarding bilateral parotid gland nodules. 24 US soft tissue head and neck showed 2 likely intraglandular lymph nodes in the parotid glands measuring 1 x 0.8 x 0.5 cm on the right and 1 x 0.7 x 1.1 cm on the left. Dr. Parnell would like ENT to do long-term monitoring. She has no complaints today.  is a retired physical medicine practitioner. They enjoy traveling.      Active Medications:     Current Outpatient Medications:      aspirin (ASA) 81 MG EC tablet, Take 81 mg by mouth daily, Disp: , Rfl:      Azelastine HCl 137 MCG/SPRAY SOLN, , Disp: , Rfl: 1     cetirizine (ZYRTEC) 10 MG tablet, Take 5 mg by mouth, Disp: , Rfl:      cyanocobalamin (VITAMIN B-12) 2500 MCG SUBL sublingual tablet, Place under the tongue once daily., Disp: , Rfl:      cycloSPORINE (RESTASIS) 0.05 % ophthalmic emulsion, Apply 1 drop to eye, Disp: , Rfl:      dextromethorphan (TUSSIN COUGH) 15 MG/5ML syrup, Take 10 mLs by mouth 4 times daily as needed for cough., Disp: , Rfl:      diclofenac (VOLTAREN) 1 % topical gel, , Disp: , Rfl:      fluticasone (FLONASE) 50 MCG/ACT nasal spray, Spray 1 spray in nostril, Disp: , Rfl:      gabapentin (NEURONTIN) 100 MG capsule, Take 3  capsules (300 mg) by mouth at bedtime, Disp: 90 capsule, Rfl: 11     lidocaine (LMX4) 4 % external cream, Apply topically once as needed for mild pain, Disp: 45 g, Rfl: 1     metroNIDAZOLE (METROCREAM) 0.75 % external cream, Apply topically daily, Disp: 45 g, Rfl: 11     modafinil (PROVIGIL) 100 MG tablet, Take 1 tablet (100 mg) by mouth daily, Disp: 30 tablet, Rfl: 5     modafinil (PROVIGIL) 100 MG tablet, Take 1 tablet (100 mg) by mouth daily, Disp: 30 tablet, Rfl: 5     oxymetazoline (AFRIN) 0.05 % nasal spray, Spray 2 sprays into both nostrils 2 times daily., Disp: , Rfl:      pilocarpine (SALAGEN) 5 MG tablet, Take 1 tablet (5 mg) by mouth 4 times daily, Disp: 120 tablet, Rfl: 11     pimecrolimus (ELIDEL) 1 % external cream, Apply topically 2 times daily, Disp: 60 g, Rfl: 4     VITAMIN D-VITAMIN K PO, Vitamin D3 and Vitamin K2, Disp: , Rfl:      acetaminophen (TYLENOL) 500 MG tablet, Take 1,000 mg by mouth (Patient not taking: Reported on 10/27/2023), Disp: , Rfl:      Calcium Carbonate Antacid 1177 MG CHEW, As needed (Patient not taking: Reported on 9/22/2022), Disp: , Rfl:      mometasone (ELOCON) 0.1 % external cream, PLEASE SEE ATTACHED FOR DETAILED DIRECTIONS (Patient not taking: Reported on 9/22/2022), Disp: , Rfl: 11     probiotic CAPS, Take 1 capsule by mouth (Patient not taking: Reported on 11/18/2022), Disp: , Rfl:       Allergies:   Codeine, Fish oil, Hydroxychloroquine, Milk (cow), No clinical screening - see comments, Penicillins, and Soybean oil      Past Medical History:  No past medical history on file.  Patient Active Problem List   Diagnosis     Osteopenia     Postsurgical Acquired Absence Of Genitalia        Past Surgical History:  Past Surgical History:   Procedure Laterality Date     HYSTERECTOMY  1985     OOPHORECTOMY  1985       Family History:   Family History   Problem Relation Age of Onset     Breast Cancer No family hx of          Social History:   Social History     Tobacco Use      "Smoking status: Never     Smokeless tobacco: Never       Review of Systems:   Pertinent items are noted in HPI or as in patient entered ROS below, remainder of complete ROS is negative.       9/10/2024     9:03 AM    ENT ROS   Constitutional Weight gain   Ears, Nose, Throat Ear pain    Ringing/noise in ears    Nasal congestion or drainage    Sore throat   Gastrointestinal/Genitourinary Heartburn/indigestion    Constipation   Endocrine Thirst    Heat or cold intolerance         Physical Exam:   /72   Pulse 62   Ht 1.6 m (5' 3\")   Wt 68.2 kg (150 lb 4.8 oz)   SpO2 94%   BMI 26.62 kg/m       Constitutional:  The patient was accompanied, well-groomed, and in no acute distress.    Skin:  Warm and pink.    Neurologic:  Alert and oriented x 3.  CNs III-XII within normal limits.  Voice normal.   Psychiatric:  The patient's affect was calm, cooperative, and appropriate.    Respiratory:  Breathing comfortably without stridor or exertion of accessory muscles.    Eyes: Extraocular movement intact.    Head:  Normocephalic and atraumatic.  No lesions or scars.    Ears:  Pinnae and tragus non-tender.  EACs and TMs were clear.   Nose:  Sinuses were non-tender.  Anterior rhinoscopy revealed midline septum and absence of purulence or polyps.    OC/OP:  Normal tongue, floor of mouth, buccal mucosa, and palate.  No lesions or masses on inspection or palpation.  No abnormal lymph tissue in the oropharynx.  The pterygoid region is non-tender.    Neck:  Supple with normal laryngeal and tracheal landmarks.  The parotid beds were without masses.  No palpable thyroid.  Lymphatic:  There is no palpable lymphadenopathy in the neck.     Assessment and Plan:    ICD-10-CM    1. Primary Sjogren's syndrome (H24)  M35.00 Adult ENT  Referral      2. Lesion of parotid gland  K11.9 Adult ENT  Referral     CT Soft tissue neck w contrast         Pily Mejia is a 74 year old female accompanied by her  with a " history of Sjogren's syndrome on immunosuppression with CsA (monitored by rheumatologist/referring provider Dr. Parnell) who presents for consultation regarding bilateral parotid gland nodules. Imaging and exam are not concerning for malignancy. Also, thyroid nodule which has been monitored since 2021 has been stable. Detailed discussion with patient and her  regarding pathophysiology and monitoring for benign-appearing masses. Recommend baseline CT soft tissue neck with contrast of parotid and thyroid masses, and then return to clinic after to discuss further treatment recommendations. They are amenable to plan.    Follow-up: after CT as above        Scribe Disclosure:  ROXANA SANDOVAL, am serving as a scribe to document services personally performed by Yefri Jimenez MD at this visit, based upon the provider's statements to me. All documentation has been reviewed by the aforementioned provider prior to being entered into the official medical record.     Scribe Preparation Attestation:  ROXANA SANDOVAL, a scribe, prepared the chart for today's encounter.       Again, thank you for allowing me to participate in the care of your patient.      Sincerely,    Yefri Jimenez MD

## 2024-09-10 NOTE — PROGRESS NOTES
Otolaryngology Clinic      Name: Pily Mejia  MRN: [id number]  Age: 74 year old  : 1950  Referring provider: Annabel Parnell  09/10/2024      Chief Complaint:  Consultation    History of Present Illness:   Pily Mejia is a 74 year old female accompanied by her  with a history of Sjogren's syndrome on immunosuppression with CsA (monitored by rheumatologist/referring provider Dr. Parnell) who presents for consultation regarding bilateral parotid gland nodules. 24 US soft tissue head and neck showed 2 likely intraglandular lymph nodes in the parotid glands measuring 1 x 0.8 x 0.5 cm on the right and 1 x 0.7 x 1.1 cm on the left. Dr. Parnell would like ENT to do long-term monitoring. She has no complaints today.  is a retired physical medicine practitioner. They enjoy traveling.      Active Medications:     Current Outpatient Medications:     aspirin (ASA) 81 MG EC tablet, Take 81 mg by mouth daily, Disp: , Rfl:     Azelastine HCl 137 MCG/SPRAY SOLN, , Disp: , Rfl: 1    cetirizine (ZYRTEC) 10 MG tablet, Take 5 mg by mouth, Disp: , Rfl:     cyanocobalamin (VITAMIN B-12) 2500 MCG SUBL sublingual tablet, Place under the tongue once daily., Disp: , Rfl:     cycloSPORINE (RESTASIS) 0.05 % ophthalmic emulsion, Apply 1 drop to eye, Disp: , Rfl:     dextromethorphan (TUSSIN COUGH) 15 MG/5ML syrup, Take 10 mLs by mouth 4 times daily as needed for cough., Disp: , Rfl:     diclofenac (VOLTAREN) 1 % topical gel, , Disp: , Rfl:     fluticasone (FLONASE) 50 MCG/ACT nasal spray, Spray 1 spray in nostril, Disp: , Rfl:     gabapentin (NEURONTIN) 100 MG capsule, Take 3 capsules (300 mg) by mouth at bedtime, Disp: 90 capsule, Rfl: 11    lidocaine (LMX4) 4 % external cream, Apply topically once as needed for mild pain, Disp: 45 g, Rfl: 1    metroNIDAZOLE (METROCREAM) 0.75 % external cream, Apply topically daily, Disp: 45 g, Rfl: 11    modafinil (PROVIGIL) 100 MG tablet, Take 1 tablet (100 mg) by mouth daily,  Disp: 30 tablet, Rfl: 5    modafinil (PROVIGIL) 100 MG tablet, Take 1 tablet (100 mg) by mouth daily, Disp: 30 tablet, Rfl: 5    oxymetazoline (AFRIN) 0.05 % nasal spray, Spray 2 sprays into both nostrils 2 times daily., Disp: , Rfl:     pilocarpine (SALAGEN) 5 MG tablet, Take 1 tablet (5 mg) by mouth 4 times daily, Disp: 120 tablet, Rfl: 11    pimecrolimus (ELIDEL) 1 % external cream, Apply topically 2 times daily, Disp: 60 g, Rfl: 4    VITAMIN D-VITAMIN K PO, Vitamin D3 and Vitamin K2, Disp: , Rfl:     acetaminophen (TYLENOL) 500 MG tablet, Take 1,000 mg by mouth (Patient not taking: Reported on 10/27/2023), Disp: , Rfl:     Calcium Carbonate Antacid 1177 MG CHEW, As needed (Patient not taking: Reported on 9/22/2022), Disp: , Rfl:     mometasone (ELOCON) 0.1 % external cream, PLEASE SEE ATTACHED FOR DETAILED DIRECTIONS (Patient not taking: Reported on 9/22/2022), Disp: , Rfl: 11    probiotic CAPS, Take 1 capsule by mouth (Patient not taking: Reported on 11/18/2022), Disp: , Rfl:       Allergies:   Codeine, Fish oil, Hydroxychloroquine, Milk (cow), No clinical screening - see comments, Penicillins, and Soybean oil      Past Medical History:  No past medical history on file.  Patient Active Problem List   Diagnosis    Osteopenia    Postsurgical Acquired Absence Of Genitalia        Past Surgical History:  Past Surgical History:   Procedure Laterality Date    HYSTERECTOMY  1985    OOPHORECTOMY  1985       Family History:   Family History   Problem Relation Age of Onset    Breast Cancer No family hx of          Social History:   Social History     Tobacco Use    Smoking status: Never    Smokeless tobacco: Never       Review of Systems:   Pertinent items are noted in HPI or as in patient entered ROS below, remainder of complete ROS is negative.       9/10/2024     9:03 AM    ENT ROS   Constitutional Weight gain   Ears, Nose, Throat Ear pain    Ringing/noise in ears    Nasal congestion or drainage    Sore throat  "  Gastrointestinal/Genitourinary Heartburn/indigestion    Constipation   Endocrine Thirst    Heat or cold intolerance         Physical Exam:   /72   Pulse 62   Ht 1.6 m (5' 3\")   Wt 68.2 kg (150 lb 4.8 oz)   SpO2 94%   BMI 26.62 kg/m       Constitutional:  The patient was accompanied, well-groomed, and in no acute distress.    Skin:  Warm and pink.    Neurologic:  Alert and oriented x 3.  CNs III-XII within normal limits.  Voice normal.   Psychiatric:  The patient's affect was calm, cooperative, and appropriate.    Respiratory:  Breathing comfortably without stridor or exertion of accessory muscles.    Eyes: Extraocular movement intact.    Head:  Normocephalic and atraumatic.  No lesions or scars.    Ears:  Pinnae and tragus non-tender.  EACs and TMs were clear.   Nose:  Sinuses were non-tender.  Anterior rhinoscopy revealed midline septum and absence of purulence or polyps.    OC/OP:  Normal tongue, floor of mouth, buccal mucosa, and palate.  No lesions or masses on inspection or palpation.  No abnormal lymph tissue in the oropharynx.  The pterygoid region is non-tender.    Neck:  Supple with normal laryngeal and tracheal landmarks.  The parotid beds were without masses.  No palpable thyroid.  Lymphatic:  There is no palpable lymphadenopathy in the neck.     Assessment and Plan:    ICD-10-CM    1. Primary Sjogren's syndrome (H24)  M35.00 Adult ENT  Referral      2. Lesion of parotid gland  K11.9 Adult ENT  Referral     CT Soft tissue neck w contrast         Pily Mejia is a 74 year old female accompanied by her  with a history of Sjogren's syndrome on immunosuppression with CsA (monitored by rheumatologist/referring provider Dr. Parnell) who presents for consultation regarding bilateral parotid gland nodules. Imaging and exam are not concerning for malignancy. Also, thyroid nodule which has been monitored since 2021 has been stable. Detailed discussion with patient and her "  regarding pathophysiology and monitoring for benign-appearing masses. Recommend baseline CT soft tissue neck with contrast of parotid and thyroid masses, and then return to clinic after to discuss further treatment recommendations. They are amenable to plan.    Follow-up: after CT as above        Scribe Disclosure:  ROXANA SANDOVAL, am serving as a scribe to document services personally performed by Yefri Jimenez MD at this visit, based upon the provider's statements to me. All documentation has been reviewed by the aforementioned provider prior to being entered into the official medical record.     Scribe Preparation Attestation:  ROXANA SANDOVAL, a scribe, prepared the chart for today's encounter.

## 2024-09-17 ENCOUNTER — ANCILLARY PROCEDURE (OUTPATIENT)
Dept: CT IMAGING | Facility: CLINIC | Age: 74
End: 2024-09-17
Attending: OTOLARYNGOLOGY
Payer: COMMERCIAL

## 2024-09-17 DIAGNOSIS — K11.9 LESION OF PAROTID GLAND: ICD-10-CM

## 2024-09-17 LAB
CREAT BLD-MCNC: 0.9 MG/DL (ref 0.5–1)
EGFRCR SERPLBLD CKD-EPI 2021: >60 ML/MIN/1.73M2

## 2024-09-17 PROCEDURE — 82565 ASSAY OF CREATININE: CPT | Performed by: PATHOLOGY

## 2024-09-17 PROCEDURE — 70491 CT SOFT TISSUE NECK W/DYE: CPT | Mod: GC | Performed by: STUDENT IN AN ORGANIZED HEALTH CARE EDUCATION/TRAINING PROGRAM

## 2024-09-17 RX ORDER — IOPAMIDOL 755 MG/ML
90 INJECTION, SOLUTION INTRAVASCULAR ONCE
Status: COMPLETED | OUTPATIENT
Start: 2024-09-17 | End: 2024-09-17

## 2024-09-17 RX ADMIN — IOPAMIDOL 90 ML: 755 INJECTION, SOLUTION INTRAVASCULAR at 17:36

## 2024-09-17 NOTE — DISCHARGE INSTRUCTIONS

## 2024-09-24 ENCOUNTER — VIRTUAL VISIT (OUTPATIENT)
Dept: OTOLARYNGOLOGY | Facility: CLINIC | Age: 74
End: 2024-09-24
Payer: COMMERCIAL

## 2024-09-24 ENCOUNTER — MYC MEDICAL ADVICE (OUTPATIENT)
Dept: OTOLARYNGOLOGY | Facility: CLINIC | Age: 74
End: 2024-09-24

## 2024-09-24 DIAGNOSIS — M35.00 PRIMARY SJOGREN'S SYNDROME (H): Primary | ICD-10-CM

## 2024-09-24 NOTE — LETTER
9/24/2024       RE: Pily Mejia  [address] [address]     Dear Colleague,    Thank you for referring your patient, Pily Mejia, to the [facility] EAR NOSE AND THROAT CLINIC [place] at [facility]. Please see a copy of my visit note below.    This patient was a video visit today she has a history of potential lesion of the parotid gland but a CAT scan was done the parotid gland looks to be normal and behind the clavicle or the clavicle there is what appears to be a cyst almost attached to the sternoclavicular joint.  We explained to the patient today that we will be laid this finding at our radiology conference and call the patient afterwards with management or other possibility for what to do afterwards we feel that the parotid is also clean and that this could almost be a as needed follow-up if the pathology is truly benign       Again, thank you for allowing me to participate in the care of your patient.      Sincerely,    Yefri Jimenez MD

## 2024-09-24 NOTE — PROGRESS NOTES
This patient was a video visit today she has a history of potential lesion of the parotid gland but a CAT scan was done the parotid gland looks to be normal and behind the clavicle or the clavicle there is what appears to be a cyst almost attached to the sternoclavicular joint.  We explained to the patient today that we will be laid this finding at our radiology conference and call the patient afterwards with management or other possibility for what to do afterwards we feel that the parotid is also clean and that this could almost be a as needed follow-up if the pathology is truly benign

## 2024-09-24 NOTE — TELEPHONE ENCOUNTER
Patient called by provider and discussed plan. Nothing further needed.    MOI ENCISO LPN on 9/24/2024 at 11:30 AM

## 2024-09-26 NOTE — TUMOR CONFERENCE
Head & Neck Tumor Conference Note   Status: New***/ Established ***   Staff: Dr. Jimenez    Tumor Site: ***  Tumor Pathology: ***  Tumor Stage: ***  Tumor Treatment: ***    Reason for Review: Review imaging, path, and POC    Brief History: This is a 74 year old female with a history of ***    Pertinent PMH: No past medical history on file.   Smoking Hx:   Social History     Tobacco Use    Smoking status: Never    Smokeless tobacco: Never       Imaging:   ***  No results found for this or any previous visit.    No results found for this or any previous visit.      Results for orders placed in visit on 09/17/24    CT Soft tissue neck w contrast    Narrative  EXAM: CT SOFT TISSUE NECK W CONTRAST  9/17/2024 5:47 PM    HISTORY:  please evaluate parotid glands and thyroid; Lesion of  parotid gland    COMPARISON:  Ultrasound head and neck soft tissue 2/29/2024 and  thyroid ultrasound 2/29/2024    TECHNIQUE: Following intravenous administration of nonionic iodinated  contrast medium, thin section helical CT images were obtained from the  skull base down to the level of the aortic arch.  Axial, coronal and  sagittal reformations were performed with 2-3 mm slice thickness  reconstruction. Images were reviewed in soft tissue, lung and bone  windows.    CONTRAST: Isovue 370 90cc    FINDINGS:  No nasopharyngeal, oropharyngeal, hypopharyngeal, or glottic mucosal  space abnormality. Normal tongue base. Heterogenous appearance of the  submandibular glands, this is nonspecific and could be sequelae of  inflammation/infection. The remainder of the salivary glands are  normal.    No lymphadenopathy.    Normal thyroid gland.    Patent cervical vasculature; no high grade arterial stenosis.    No suspicious osseus lesion. No high grade spinal canal stenosis.    Clear paranasal sinuses and mastoid air cells. No periapical dental  lucency. The imaged skull base, intracranial and orbital structures  are within normal limits.    There is a  2.4 x 2.1 cm hypoattenuating focus posterior to the left  clavicular head and inferior to left thyroid lobe that demonstrates  serous fluid density. No suspicious finding in the visualized superior  mediastinum/thorax. Clear lung apices.    Impression  IMPRESSION:  1. The thyroid and bilateral parotid glands are normal.  2. There is a well-circumscribed benign-appearing fluid-filled focus  posterior left clavicular head and inferior to left thyroid lobe.  3. Heterogenous appearance of the bilateral submandibular glands which  is nonspecific but could be sequelae of inflammation/infection.  4. No cervical lymphadenopathy.    I have personally reviewed the examination and initial interpretation  and I agree with the findings.    MERY DIALLO MD      SYSTEM ID:  [id number]    No results found for this or any previous visit.        No results found for this or any previous visit.    No results found for this or any previous visit.      6040; 6044-PET  192 - CT neck with contrast  202 -CT chest with contrast    5220, 271; MRI Brain/Neck soft tissue, MRI brain    Pathology:   ***    Tumor Board Recommendation:   Discussion: Imaging reviewed. There is a cystic focus without calcification or wall thickening. Report from 2021 described slightly enlarged cyst as well. Cyst appears separate from thyroid. Cold potentially be of parathyroid origin but would expect lab changes. Overall looks like a stable benign cystic process. No need for further surveillance imaging unless symptoms arise. Parotid glands are clear.     Plan: ***    Valentina Ramos MD  Otolaryngology- Head and Neck Surgery, PGY-3    Documentation / Disclaimer Cancer Tumor Board Note: Cancer tumor board recommendations do not override what is determined to be reasonable care and treatment, which is dependent on the circumstances of a patient's case; the patient's medical, social, and personal concerns; and the clinical judgment of the oncologist  [physician].

## 2024-09-27 ENCOUNTER — TUMOR CONFERENCE (OUTPATIENT)
Dept: ONCOLOGY | Facility: CLINIC | Age: 74
End: 2024-09-27
Payer: COMMERCIAL

## 2024-09-27 NOTE — TUMOR CONFERENCE
LVM for patient to review recommendations from conference. Provided direct call back number for patient to return call. Plan for PRN follow up.    Ioana NICOLASN, RN

## 2024-10-14 NOTE — PROGRESS NOTES
[facility] Dermatology Note  Encounter Date: Oct 16, 2024  Office Visit     Dermatology Problem List:  1. 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 Sjogren syndrome (+RUSLAN 1:320, speckled, +Ro60 234, +SSA 4.6, weak +APLA 18.4)  - RF-, C3/C4 normal, dsDNA-, Ig normal, HCV-, SPEP normal  - prior: hydroxychloroquine (nausea, hair loss, dizziness)  - Salivary glands scan wnl  3. Burning dysesthesia on face  - gabapentin 100 mg daily  4. Anhidrosis   -  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 SHe thinks that is since childhood.  - Other autonomic reflex screen was minimally abnormal. Isolated finding of focal reduced sweat response to the proximal leg with normal cardiovagal and adrenergic reflexes, which was of uncertain significance.   - Brain MRI wnl.  5. Rosacea   - pimecrolimus PRN  - prior tx: doxycycline  6. Osteopenia  7. S/p Hysterectomy with bilateral salpingo-oophorectomy for endometriosis, age 36.  8. 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 Hx of vitamin D deficiency   10. Diminished thyroid uptake on left compared to rt on scan  11. 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 Photoaging  - SkinDiscoloration Defense    ____________________________________________    Assessment & Plan:  # Seborrheic keratosis, non irritated. Neck, L shin  - Reviewed cosmetic destruction option. Will consider for future.    # Photoaging  - Continue SkinDiscoloration Defense daily    # Facial volume depletion/Rhytides.  - Declines fillers      Procedures Performed:   none    Follow-up: 6 month(s) in-person with Dr Araujo, or earlier for new or changing lesions    Staff and Scribe:     Scribe Disclosure:   Gail SANDOVAL, am serving as a scribe to document services personally performed by Marilee Vences MD based on data collection and the provider's statements to  me.     Provider Disclosure:   The documentation recorded by the scribe accurately reflects the services I personally performed and the decisions made by me.    Marilee Vences MD    Department of Dermatology  [facility]: Phone: [phone], Fax:[phone]  [facility]: Phone: [phone], Fax: [phone]   ____________________________________________    CC: Cryotherapy (Follow-up from last visit. Patient DOES NOT want filler due to issues with neuropathy. Patient reports some new spots of concern on her legs that appear similar to her hands. )    HPI:  Ms. Pily Mejia is a(n) 74 year old female who presents today as a return patient for spot check    Today, patient reports site on hand has healed  Reports irritation on face from one of her creams. Likes the SkinDiscoloration Defense. Reports larger dark spot on jawline that serum is not helping. Reports dark spots on the legs.       Patient is otherwise feeling well, without additional skin concerns.    Labs Reviewed:  N/A    Physical Exam:  Vitals: There were no vitals taken for this visit.  SKIN: Focused examination of face and hands was performed.  - There is a waxy stuck on tan to brown papule on the neck and L shin  - No other lesions of concern on areas examined.     Medications:  Current Outpatient Medications   Medication Sig Dispense Refill    aspirin (ASA) 81 MG EC tablet Take 81 mg by mouth daily      Azelastine HCl 137 MCG/SPRAY SOLN   1    cetirizine (ZYRTEC) 10 MG tablet Take 5 mg by mouth      cyanocobalamin (VITAMIN B-12) 2500 MCG SUBL sublingual tablet Place under the tongue once daily.      cycloSPORINE (RESTASIS) 0.05 % ophthalmic emulsion Apply 1 drop to eye      dextromethorphan (TUSSIN COUGH) 15 MG/5ML syrup Take 10 mLs by mouth 4 times daily as needed for cough.      diclofenac (VOLTAREN) 1 % topical gel        fluticasone (FLONASE) 50 MCG/ACT nasal spray Spray 1 spray in nostril      gabapentin (NEURONTIN) 100 MG capsule Take 3 capsules (300 mg) by mouth at bedtime 90 capsule 11    lidocaine (LMX4) 4 % external cream Apply topically once as needed for mild pain 45 g 1    metroNIDAZOLE (METROCREAM) 0.75 % external cream Apply topically daily 45 g 11    modafinil (PROVIGIL) 100 MG tablet Take 1 tablet (100 mg) by mouth daily 30 tablet 5    oxymetazoline (AFRIN) 0.05 % nasal spray Spray 2 sprays into both nostrils 2 times daily.      pimecrolimus (ELIDEL) 1 % external cream Apply topically 2 times daily 60 g 4    VITAMIN D-VITAMIN K PO Vitamin D3 and Vitamin K2      acetaminophen (TYLENOL) 500 MG tablet Take 1,000 mg by mouth (Patient not taking: Reported on 10/27/2023)      Calcium Carbonate Antacid 1177 MG CHEW As needed (Patient not taking: Reported on 9/22/2022)      modafinil (PROVIGIL) 100 MG tablet Take 1 tablet (100 mg) by mouth daily 30 tablet 5    mometasone (ELOCON) 0.1 % external cream PLEASE SEE ATTACHED FOR DETAILED DIRECTIONS (Patient not taking: Reported on 9/22/2022)  11    pilocarpine (SALAGEN) 5 MG tablet Take 1 tablet (5 mg) by mouth 4 times daily (Patient not taking: Reported on 10/16/2024) 120 tablet 11    probiotic CAPS Take 1 capsule by mouth (Patient not taking: Reported on 10/16/2024)       No current facility-administered medications for this visit.      Past Medical History:   Patient Active Problem List   Diagnosis    Osteopenia    Postsurgical Acquired Absence Of Genitalia     No past medical history on file.     CC No referring provider defined for this encounter. on close of this encounter.

## 2024-10-16 ENCOUNTER — OFFICE VISIT (OUTPATIENT)
Dept: DERMATOLOGY | Facility: CLINIC | Age: 74
End: 2024-10-16
Payer: COMMERCIAL

## 2024-10-16 DIAGNOSIS — L82.1 SEBORRHEIC KERATOSIS: Primary | ICD-10-CM

## 2024-10-16 PROCEDURE — 99213 OFFICE O/P EST LOW 20 MIN: CPT | Performed by: DERMATOLOGY

## 2024-10-16 ASSESSMENT — PAIN SCALES - GENERAL: PAINLEVEL: NO PAIN (0)

## 2024-10-16 NOTE — Clinical Note
10/16/2024       RE: Pily Mejia  [address] [address]     Dear Colleague,    Thank you for referring your patient, Pily Mejia, to the [facility] [place] at [facility]. Please see a copy of my visit note below.      [facility] Dermatology Note  Encounter Date: Oct 16, 2024  Office Visit     Dermatology Problem List:  1. 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 Sjogren syndrome (+RUSLAN 1:320, speckled, +Ro60 234, +SSA 4.6, weak +APLA 18.4)  - RF-, C3/C4 normal, dsDNA-, Ig normal, HCV-, SPEP normal  - prior: hydroxychloroquine (nausea, hair loss, dizziness)  - Salivary glands scan wnl  3. Burning dysesthesia on face  - gabapentin 100 mg daily  4. Anhidrosis   -  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 SHe thinks that is since childhood.  - Other autonomic reflex screen was minimally abnormal. Isolated finding of focal reduced sweat response to the proximal leg with normal cardiovagal and adrenergic reflexes, which was of uncertain significance.   - Brain MRI wnl.  5. Rosacea   - pimecrolimus PRN  - prior tx: doxycycline  6. Osteopenia  7. S/p Hysterectomy with bilateral salpingo-oophorectomy for endometriosis, age 36.  8. 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 Hx of vitamin D deficiency   10. Diminished thyroid uptake on left compared to rt on scan  11. 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 Photoaging  - SkinDiscoloration Defense    ____________________________________________    Assessment & Plan:      # Seborrheic keratosis, non irritated. Neck, L shin  - Assured patient of benign nature.  - Reviewed cosmetic destruction option. Will consider for future.    # Photoaging  - Continue SkinDiscoloration Defense    # Facial volume  depletion/Rhytides.  - Declines fillers      Procedures Performed:   none    Follow-up: 6 month(s) in-person with Dr Araujo, or earlier for new or changing lesions    Staff and Scribe:     Scribe Disclosure:   I, Gail Yang, am serving as a scribe to document services personally performed by Marilee Vences MD based on data collection and the provider's statements to me.     ***  ____________________________________________    CC: Cryotherapy (Follow-up from last visit. Patient DOES NOT want filler due to issues with neuropathy. Patient reports some new spots of concern on her legs that appear similar to her hands. )    HPI:  Ms. Pily Mejia is a(n) 74 year old female who presents today as a return patient for spot check    Today, patient reports site on hand has healed  Reports irritation on face from one of her creams. Likes the SkinDiscoloration Defense. Reports larger dark spot on jawline that serum is not helping. Reports dark spots on the legs.       Patient is otherwise feeling well, without additional skin concerns.    Labs Reviewed:  N/A    Physical Exam:  Vitals: There were no vitals taken for this visit.  SKIN: Focused examination of face and hands was performed.  - There is a waxy stuck on tan to brown papule on the neck and L shin  - No other lesions of concern on areas examined.     Medications:  Current Outpatient Medications   Medication Sig Dispense Refill    aspirin (ASA) 81 MG EC tablet Take 81 mg by mouth daily      Azelastine HCl 137 MCG/SPRAY SOLN   1    cetirizine (ZYRTEC) 10 MG tablet Take 5 mg by mouth      cyanocobalamin (VITAMIN B-12) 2500 MCG SUBL sublingual tablet Place under the tongue once daily.      cycloSPORINE (RESTASIS) 0.05 % ophthalmic emulsion Apply 1 drop to eye      dextromethorphan (TUSSIN COUGH) 15 MG/5ML syrup Take 10 mLs by mouth 4 times daily as needed for cough.      diclofenac (VOLTAREN) 1 % topical gel       fluticasone (FLONASE) 50 MCG/ACT nasal spray Spray 1  spray in nostril      gabapentin (NEURONTIN) 100 MG capsule Take 3 capsules (300 mg) by mouth at bedtime 90 capsule 11    lidocaine (LMX4) 4 % external cream Apply topically once as needed for mild pain 45 g 1    metroNIDAZOLE (METROCREAM) 0.75 % external cream Apply topically daily 45 g 11    modafinil (PROVIGIL) 100 MG tablet Take 1 tablet (100 mg) by mouth daily 30 tablet 5    oxymetazoline (AFRIN) 0.05 % nasal spray Spray 2 sprays into both nostrils 2 times daily.      pimecrolimus (ELIDEL) 1 % external cream Apply topically 2 times daily 60 g 4    VITAMIN D-VITAMIN K PO Vitamin D3 and Vitamin K2      acetaminophen (TYLENOL) 500 MG tablet Take 1,000 mg by mouth (Patient not taking: Reported on 10/27/2023)      Calcium Carbonate Antacid 1177 MG CHEW As needed (Patient not taking: Reported on 9/22/2022)      modafinil (PROVIGIL) 100 MG tablet Take 1 tablet (100 mg) by mouth daily 30 tablet 5    mometasone (ELOCON) 0.1 % external cream PLEASE SEE ATTACHED FOR DETAILED DIRECTIONS (Patient not taking: Reported on 9/22/2022)  11    pilocarpine (SALAGEN) 5 MG tablet Take 1 tablet (5 mg) by mouth 4 times daily (Patient not taking: Reported on 10/16/2024) 120 tablet 11    probiotic CAPS Take 1 capsule by mouth (Patient not taking: Reported on 10/16/2024)       No current facility-administered medications for this visit.      Past Medical History:   Patient Active Problem List   Diagnosis    Osteopenia    Postsurgical Acquired Absence Of Genitalia     No past medical history on file.     CC No referring provider defined for this encounter. on close of this encounter.       Again, thank you for allowing me to participate in the care of your patient.      Sincerely,    Mairlee Vences MD

## 2024-10-16 NOTE — NURSING NOTE
Dermatology Rooming Note    Pily Mejia's goals for this visit include:   Chief Complaint   Patient presents with    Cryotherapy     Follow-up from last visit. Patient DOES NOT want filler due to issues with neuropathy. Patient reports some new spots of concern on her legs that appear similar to her hands.         - Does patient need refills? N/A    Griselda Dominguez

## 2024-10-28 ENCOUNTER — MYC MEDICAL ADVICE (OUTPATIENT)
Dept: RHEUMATOLOGY | Facility: CLINIC | Age: 74
End: 2024-10-28
Payer: COMMERCIAL

## 2024-11-29 ENCOUNTER — OFFICE VISIT (OUTPATIENT)
Dept: RHEUMATOLOGY | Facility: CLINIC | Age: 74
End: 2024-11-29
Attending: INTERNAL MEDICINE
Payer: COMMERCIAL

## 2024-11-29 ENCOUNTER — TELEPHONE (OUTPATIENT)
Dept: DERMATOLOGY | Facility: CLINIC | Age: 74
End: 2024-11-29

## 2024-11-29 VITALS
TEMPERATURE: 97.4 F | HEIGHT: 63 IN | SYSTOLIC BLOOD PRESSURE: 139 MMHG | WEIGHT: 150 LBS | OXYGEN SATURATION: 96 % | BODY MASS INDEX: 26.58 KG/M2 | HEART RATE: 64 BPM | DIASTOLIC BLOOD PRESSURE: 77 MMHG

## 2024-11-29 DIAGNOSIS — E55.9 VITAMIN D DEFICIENCY, UNSPECIFIED: ICD-10-CM

## 2024-11-29 DIAGNOSIS — M35.00 PRIMARY SJOGREN'S SYNDROME (H): Primary | ICD-10-CM

## 2024-11-29 PROCEDURE — G0463 HOSPITAL OUTPT CLINIC VISIT: HCPCS | Performed by: INTERNAL MEDICINE

## 2024-11-29 PROCEDURE — 99215 OFFICE O/P EST HI 40 MIN: CPT | Mod: 24 | Performed by: INTERNAL MEDICINE

## 2024-11-29 PROCEDURE — G2211 COMPLEX E/M VISIT ADD ON: HCPCS | Performed by: INTERNAL MEDICINE

## 2024-11-29 ASSESSMENT — PAIN SCALES - GENERAL: PAINLEVEL_OUTOF10: EXTREME PAIN (9)

## 2024-11-29 NOTE — LETTER
11/29/2024       RE: Pily Mejia  [address] [facility] [address]     Dear Colleague,    Thank you for referring your patient, Pily Mejia, to the [facility] [place] at [facility]. Please see a copy of my visit note below.    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    Her major complaint is extreme fatigue and pain.    She has h/o Sjogren's.    She was on HCQ [id number]. Stopped due to dizziness (not tolerable, resolved off HCQ)). It also caused hair loss (grew back off HCQ) and nausea (tolerable with eating it with meals)    Was on methotrexate, it caused renal/liver abnormality, she does not want to go back on it. Without it, her knees hurt more, has torn tendon over R hip and has pain over R hip with radiation to the R leg. It did hurt on carrying luggage, babies on the trip. Hands are swollen and hurt.    Used to be on AZA.    Skin is dry.    Eyes are dry, uses restasis.    Lung nodes in her lungs were found as part of CP work up.    Likes to take control of her Sjogren's.    Morphea is stable off methotrexate.    Has fatigue. Thinks that meds helped with fatigue.    Takes vit D with K, B, probiotic, life (collagen for joints).    Walks 6000 steps a day, during trip, more walking triggered pain.    It is hard in the morning to get up and move.    Uses voltaren gel over knees and takes tylenol.    Drinks a lot of water plus tea.    No ETOH.    Belongs to with watchers.    No other rashes, just dry spots over legs.     Gets red dots from sun exposure. Uses sun screen and sun protective clothes.    Has h/o  rosacea    Has some tingling over R leg after overdoing it.    Has dysautonomia based on testing at [place].     Has GI sx, with certain diet gets N/V/D, sometimes has constipation. Gets bloating.    Gets canker sores, not constantly. Roof of her mouth is sensitive.    No h/o seizures.    Resatsis controls dryness of eyes.    Drinking water helps, has used fluoride.    No Raynaud's.    No persistent parotid gland swelling.    No LAP.    No cough, CP.    Gets ESOB, quit aerobics during covid pandemic, attributes it to deconditioning.    Could walk without need to stop by drinking water.    Mood is ok.    Sometimes has brain fog.      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    Her records were reviewed.        CT Chest w/o Contrast  Order: [id number]  Impression    1.  Solid nodule right lower lobe and groundglass nodule left lower lobe, stable on examinations dating back to 11/20/2020.  Narrative    For Patients: As a result of the 21st Century Cures Act, medical imaging exams and procedure reports are released immediately into your electronic medical record. You may view this report before your referring provider. If you have questions, please contact your health care  provider.    EXAM: CT CHEST WITHOUT CONTRAST  LOCATION: [facility]  DATE: 12/06/2023    INDICATION: Pulmonary nodules.  COMPARISON: 08/29/2022. 02/04/2021. 11/20/2020.  TECHNIQUE: CT chest without IV contrast. Multiplanar reformats were obtained. Dose reduction techniques were used.  CONTRAST: None.    FINDINGS:  LUNGS AND PLEURA:    Right lung: 7 x 5 mm nodule periphery of the right lower lobe medially series 4 image 168, stable. No other nodules in the right lung.    Left lung: Groundglass nodule left lower lobe series 4 image 110 measuring approximately 6 mm, stable. Tiny granuloma left lower lobe.    No new nodules.    MEDIASTINUM/AXILLAE: 1.8 cm nodule inferior left lobe thyroid, stable. No adenopathy.    CORONARY ARTERY CALCIFICATION: None.    UPPER ABDOMEN: No significant finding.    MUSCULOSKELETAL: Unremarkable.      US Parotid Submandibular Glands  Order: [id number]  Impression    The parotid and submandibular glands on each side demonstrate normal  sonographic appearance.  Narrative    EXAM: US PAROTID SUBMANDIBULAR GLANDS    COMPARISON: None.    FINDINGS: The submandibular and parotid glands on each side demonstrate normal  sonographic appearance. The glandular parenchyma is homogeneous.  A 0.6 cm in the greatest dimension hypoechoic nodule in the deep left parotid  gland likely represents a tiny intraglandular lymph node. No other mass is  identified.  Exam End: 12/21/21  3:22 PM    Specimen Collected: 12/21/21  3:27 PM Last Resulted: 12/21/21  3:31 PM   Received From: [facility]  Result Received: 05/09/23  1:30 PM      EXAM: US THYROID/PARATHYROID  LOCATION: [facility]  DATE/TIME: 8/21/2020 3:10 PM    INDICATION: Thyroid Cyst  COMPARISON: CT chest 08/14/2020  TECHNIQUE: Thyroid ultrasound.    FINDINGS:  RIGHT lobe: 1.5 x 1.1 x 1.5 cm. Homogeneous echotexture. Tiny 3 mm cyst is noted  within the interpolar region of the right lobe of the thyroid.  Isthmus: 2 mm.  LEFT lobe: 5 x 0.9 x 1.4  cm. Homogeneous echotexture.    NECK: No cervical lymphadenopathy. 3.2 x 2.1 x 1.8 cm cyst is noted inferior to  the left lobe of the thyroid.    NODULES:    IMPRESSION:  1.  3.2 x 2.1 x 1.8 cm cyst is noted inferior to the left lobe of the thyroid.      Nodules are characterized per  ACR Thyroid Imaging, Reporting and Data System (TI-RADS): White Paper of the ACR  TI-RADS Committee  Jeremy Bedoya. et al. Journal of the American College of Radiology 2017.  Volume 14 (2017), Issue 5, 088-004.  Exam End: 08/21/20  3:10 PM    Specimen Collected: 08/21/20  3:10 PM Last Resulted: 08/21/20  3:29 PM   Received From: [facility]  Result Received: 05/09/23  1:26 PM      Component      Latest Ref Rng 8/15/2024  8:05 AM   WBC      4.0 - 11.0 10e3/uL 4.7    RBC Count      3.80 - 5.20 10e6/uL 4.85    Hemoglobin      11.7 - 15.7 g/dL 14.8    Hematocrit      35.0 - 47.0 % 44.1    MCV      78 - 100 fL 91    MCH      26.5 - 33.0 pg 30.5    MCHC      31.5 - 36.5 g/dL 33.6    RDW      10.0 - 15.0 % 12.5    Platelet Count      150 - 450 10e3/uL 267    % Neutrophils      % 55    % Lymphocytes      % 36    % Monocytes      % 6    % Eosinophils      % 3    % Basophils      % 0    % Immature Granulocytes      % 0    Absolute Neutrophils      1.6 - 8.3 10e3/uL 2.6    Absolute Lymphocytes      0.8 - 5.3 10e3/uL 1.7    Absolute Monocytes      0.0 - 1.3 10e3/uL 0.3    Absolute Eosinophils      0.0 - 0.7 10e3/uL 0.1    Absolute Basophils      0.0 - 0.2 10e3/uL 0.0    Absolute Immature Granulocytes      <=0.4 10e3/uL 0.0    Sodium      135 - 145 mmol/L 139    Potassium      3.4 - 5.3 mmol/L 4.6    Carbon Dioxide (CO2)      22 - 29 mmol/L 26    Anion Gap      7 - 15 mmol/L 9    Urea Nitrogen      8.0 - 23.0 mg/dL 13.9    Creatinine      0.51 - 0.95 mg/dL 0.89    GFR Estimate      >60 mL/min/1.73m2 68    Calcium      8.8 - 10.4 mg/dL 9.4    Chloride      98 - 107 mmol/L 104    Glucose      70 - 99 mg/dL 101  (H)    Alkaline Phosphatase      40 - 150 U/L 122    AST      0 - 45 U/L 27    ALT      0 - 50 U/L 16    Protein Total      6.4 - 8.3 g/dL 7.0    Albumin      3.5 - 5.2 g/dL 4.1    Bilirubin Total      <=1.2 mg/dL 0.3    Vitamin D, Total (25-Hydroxy)      20 - 50 ng/mL 73 (H)    Beta 2 Glycoprotein 1 Antibody IgA      <7.0 U/mL 6.8       Legend:  (H) High  PMHx:    Sjogren syndrome (+RUSLAN 1:320, speckled, +Ro60 234, +SSA 4.6, weak +APLA 18.4)  - RF-, C3/C4 normal, dsDNA-, Ig normal, HCV-, SPEP normal    Burning dysesthesia on face      Anhidrosis   -  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 SHe thinks that is since childhood.  - Other autonomic reflex screen was minimally abnormal. Isolated finding of focal reduced sweat response to the proximal leg with normal cardiovagal and adrenergic reflexes, which was of uncertain significance.   - Brain MRI wnl.    Rosacea     Osteopenia      S/p Hysterectomy with bilateral salpingo-oophorectomy for endometriosis, age 36.    Chronic stable ground glass opacities rt lower lobe     Hx of vitamin D deficiency     Diminished thyroid uptake on left compared to rt on scan         Past Surgical History:   Procedure Laterality Date     HYSTERECTOMY       OOPHORECTOMY       Family History   Problem Relation Age of Onset     Breast Cancer No family hx of    No FHx of diagnosed autoimmune conditions    Social History     Socioeconomic History     Marital status:      Spouse name: Not on file     Number of children: Not on file     Years of education: Not on file     Highest education level: Not on file   Occupational History     Not on file   Tobacco Use     Smoking status: Never     Smokeless tobacco: Never   Substance and Sexual Activity     Alcohol use: Not on file     Drug use: Not on file     Sexual activity: Not on file   Other Topics Concern     Not on file   Social History Narrative     Not on file     Social Drivers of Health     Financial  Resource Strain: Low Risk  (3/8/2022)    Received from [facility], [facility]    Financial Resource Strain      Difficulty of Paying Living Expenses: 3      Difficulty of Paying Living Expenses: Not on file   Food Insecurity: No Food Insecurity (3/8/2022)    Received from [facility] [employer] [facility], [facility]    Food Insecurity      Worried About Running Out of Food in the Last Year: 1   Transportation Needs: No Transportation Needs (3/8/2022)    Received from [facility], [facility]    Transportation Needs      Lack of Transportation (Medical): 1   Physical Activity: Insufficiently Active (1/13/2022)    Received from [facility]    Exercise Vital Sign      Days of Exercise per Week: 2 days      Minutes of Exercise per Session: 30 min   Stress: No Stress Concern Present (1/13/2022)    Received from [facility]    [employer] - Occupational Stress Questionnaire      Feeling of Stress : Only a little   Social Connections: Unknown (3/16/2023)    Received from [facility], [facility]    Social Connections      Frequency of Communication with Friends and Family: Not on file   Interpersonal Safety: Not At Risk (1/13/2022)    Received from [facility]    Humiliation, Afraid, Rape, and Kick questionnaire      Fear of Current or Ex-Partner: No      Emotionally Abused: No      Physically Abused: No      Sexually Abused: No   Housing Stability: Low Risk  (3/8/2022)    Received from [facility], [facility] [employer] [facility]    Housing Stability      Unable to Pay for Housing in the Last Year: 1     Patient Active Problem List   Diagnosis     Osteopenia     Postsurgical Acquired Absence Of Genitalia      Allergies   Allergen Reactions     Codeine      Fish Oil GI Disturbance     Hydroxychloroquine Other (See Comments) and GI Disturbance     Milk (Cow) GI Disturbance     No Clinical Screening - See Comments Other (See Comments)     Triminycine per patient     Penicillins      Soybean Oil GI Disturbance       Outpatient Encounter Medications as of 11/29/2024   Medication Sig Dispense Refill     acetaminophen (TYLENOL) 500 MG tablet Take 1,000 mg by mouth.       aspirin (ASA) 81 MG EC tablet Take 81 mg by mouth daily       Azelastine HCl 137 MCG/SPRAY SOLN   1     Calcium Carbonate Antacid 1177 MG CHEW        cetirizine (ZYRTEC) 10 MG tablet Take 5 mg by mouth       cyanocobalamin (VITAMIN B-12) 2500 MCG SUBL sublingual tablet Place under the tongue once daily.       cycloSPORINE (RESTASIS) 0.05 % ophthalmic emulsion Apply 1 drop to eye       dextromethorphan (TUSSIN COUGH) 15 MG/5ML syrup Take 10 mLs by mouth 4 times daily as needed for cough.       diclofenac (VOLTAREN) 1 % topical gel        fluticasone (FLONASE) 50 MCG/ACT nasal spray Spray 1 spray in nostril       gabapentin (NEURONTIN) 100 MG capsule Take 3 capsules (300 mg) by mouth at bedtime 90 capsule 11     lidocaine (LMX4) 4 % external cream Apply topically once as needed for mild pain 45 g 1     metroNIDAZOLE (METROCREAM) 0.75 % external cream Apply topically 2 times daily. 45 g 11     metroNIDAZOLE (METROCREAM) 0.75 % external cream Apply topically daily 45 g 11     modafinil (PROVIGIL) 100 MG tablet Take 1 tablet (100 mg) by mouth daily 30 tablet 5     mometasone (ELOCON) 0.1 % external cream   11     oxymetazoline (AFRIN) 0.05 % nasal spray Spray 2 sprays into both nostrils 2 times daily.       pilocarpine (SALAGEN) 5 MG tablet Take 1 tablet (5 mg) by mouth 4 times daily 120 tablet 11     pimecrolimus (ELIDEL) 1 % external cream Apply topically 2 times daily 60 g 4     probiotic CAPS Take 1 capsule by mouth.       VITAMIN D-VITAMIN K PO Vitamin D3  "and Vitamin K2       modafinil (PROVIGIL) 100 MG tablet Take 1 tablet (100 mg) by mouth daily 30 tablet 5     No facility-administered encounter medications on file as of 11/29/2024.             Ph.E:    /77 (BP Location: Right arm)   Pulse 64   Temp 97.4  F (36.3  C)   Ht 1.6 m (5' 3\")   Wt 68 kg (150 lb)   SpO2 96%   BMI 26.57 kg/m        Constitutional: WD/WN. Pleasant. In no acute distress.  present.  Eyes: EOM intact, sclera anicteric, conj not injected  HEENT: No oral ulcers or thrush. Poor salivary pool. No parotid gland enlargement.  Neck: No cervical LAP or thyromegaly  Chest: Clear to auscultation bilaterally  CV: RRR, no murmurs/ rubs or gallops. No edema, clubbing or cyanosis.   GI: Abdomen is soft and non tender.   MS: No synovitis. Cool joints. OA changes of hands  Skin: morphea over forehead (unchanged, stable), large erosive patch over back of L hand with surrounds erythema and edema and no discharge (s/p cryo tx 11/21)  Neuro: A&O x 3. Grossly non focal  Psych: NL affect    Assessment/ plan:    5/2024      1. Sjogren's. Discussed Dx, Mx. Reviewed labs. Fatigue is better, could hold off trying provigil. Was advised to try pilocarpine for dental health; risks were discussed.    2. R hip bursitis. Injected 2/2024, pain resolved. Was advised to do stretching exercises.    3. Thyroid nodule. Neck US showed smaller nodule with no need for FNA or follow up.    4. Parotid gland cyst. US showed cysts, LNs. Will refer to ENT for long term follow up.    5. Morphea. Failed AZA, did not tolerate HCQ (dizziness, hair loss) or methotrexate (liver abnormality). Stable off methotrexate. Recommend follow up with rheum-derm, cellcept could be an option in future if it gets worse. LFTs are normal now off methotrexate    6. Borderline positive beta 2 GP I IgA. Unclear significance, will re-check, no need for anti-coagulation. Rest of APLA were negative.    7. High vit D 75, but cutting back on vit D " caused leg cramps, she is going to try taking more, will re-check vit D to make sure it does not go very high    Plan:        Go up on vit D to 5 tabs of 5000 units a week    Re-check vit D, beta 2 GPI IgA in 3 months    Refer to ENT    Try pilocarpine    Hip bursitis exercises    Return in 6 months in person      Today 11/29/2024:    Today we discussed the nature of Sjogren's Dx, Mx. The fact that there is no FDA approved to treat Sjogren's or prevent progression of disease or prevention of lymphoma. She is stable, and has no RFs for lymphoma (nl C4, neg cryo, no SPEP). I reviewed imaging, no concerning LNs. She asked about AZA, I told her that it would not prevent lymphoma, in fact could increase risk of lymphoma. I recommend to monitor sx, labs for now.       She had cryo tx over L hand on 11/21, noticed weeping which stopped today, pain/swelling. This is unusual for her, she had cryo tx over R hand before. She is heading airport to travel to [place]. I took pictures (under media tab) and reached out to dermatology team to address her concern, need for antibiotic. The on call derm called her with plan of care.    Plan 11/29/2024:    Labs in 2/2025    Return in a year in person    Orders Placed This Encounter   Procedures     ALT     Albumin level     AST     Creatinine     Complement C4     Complement C3     CRP inflammation     Erythrocyte sedimentation rate auto     UA with Microscopic reflex to Culture     Protein  random urine     Creatinine random urine     Cryoglobulin, Quantitative with Reflex to Identification     Vitamin D Deficiency     CBC with Platelets & Differential     Protein electrophoresis         TT 40 min was spent on date of the encounter doing chart review, history and exam, documentation and further activities as noted above. Any prior notes, outside records, laboratory results, and imaging studies were reviewed if relevant.      The longitudinal plan of care for the diagnosis(es)/condition(s)  as documented were addressed during this visit. Due to the added complexity in care, I will continue to support Pily in the subsequent management and with ongoing continuity of care.      Annabel Parnell MD                  Again, thank you for allowing me to participate in the care of your patient.      Sincerely,    Annabel Parnell MD

## 2024-11-29 NOTE — PROGRESS NOTES
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    Her major complaint is extreme fatigue and pain.    She has h/o Sjogren's.    She was on HCQ [id number]. Stopped due to dizziness (not tolerable, resolved off HCQ)). It also caused hair loss (grew back off HCQ) and nausea (tolerable with eating it with meals)    Was on methotrexate, it caused renal/liver abnormality, she does not want to go back on it. Without it, her knees hurt more, has torn tendon over R hip and has pain over R hip with radiation to the R leg. It did hurt on carrying luggage, babies on the trip. Hands are swollen and hurt.    Used to be on AZA.    Skin is dry.    Eyes are dry, uses restasis.    Lung nodes in her lungs were found as part of CP work up.    Likes to take control of her Sjogren's.    Morphea is stable off methotrexate.    Has fatigue. Thinks that meds helped with fatigue.    Takes vit D with K, B, probiotic, life (collagen for joints).    Walks 6000 steps a day, during trip, more walking triggered pain.    It is hard in the morning to get up and move.    Uses voltaren gel over knees and takes tylenol.    Drinks a lot of water plus tea.    No ETOH.    Belongs to with watchers.    No other rashes, just dry spots over legs.     Gets red dots from sun exposure. Uses sun screen and sun protective clothes.    Has h/o rosacea    Has some tingling over R leg after overdoing it.    Has dysautonomia based on testing at [facility].     Has GI sx, with certain diet gets N/V/D, sometimes has constipation. Gets bloating.    Gets canker sores, not constantly. Roof of her mouth is sensitive.    No h/o seizures.    Resatsis controls dryness of  eyes.    Drinking water helps, has used fluoride.    No Raynaud's.    No persistent parotid gland swelling.    No LAP.    No cough, CP.    Gets ESOB, quit aerobics during covid pandemic, attributes it to deconditioning.    Could walk without need to stop by drinking water.    Mood is ok.    Sometimes has brain fog.      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    Her records were reviewed.        CT Chest w/o Contrast  Order: [id number]  Impression    1.  Solid nodule right lower lobe and groundglass nodule left lower lobe, stable on examinations dating back to 11/20/2020.  Narrative    For Patients: As a result of the 21st Century Cures Act, medical imaging exams and procedure reports are released immediately into your electronic medical record. You may view this report before your referring provider. If you have questions, please contact your health care provider.    EXAM: CT CHEST WITHOUT CONTRAST  LOCATION: [facility] MEDICAL IMAG[facility]  DATE: 12/06/2023    INDICATION: Pulmonary nodules.  COMPARISON: 08/29/2022. 02/04/2021. 11/20/2020.  TECHNIQUE: CT chest without IV contrast. Multiplanar reformats were obtained. Dose reduction techniques were used.  CONTRAST:  None.    FINDINGS:  LUNGS AND PLEURA:    Right lung: 7 x 5 mm nodule periphery of the right lower lobe medially series 4 image 168, stable. No other nodules in the right lung.    Left lung: Groundglass nodule left lower lobe series 4 image 110 measuring approximately 6 mm, stable. Tiny granuloma left lower lobe.    No new nodules.    MEDIASTINUM/AXILLAE: 1.8 cm nodule inferior left lobe thyroid, stable. No adenopathy.    CORONARY ARTERY CALCIFICATION: None.    UPPER ABDOMEN: No significant finding.    MUSCULOSKELETAL: Unremarkable.      US Parotid Submandibular Glands  Order: [id number]  Impression    The parotid and submandibular glands on each side demonstrate normal  sonographic appearance.  Narrative    EXAM: US PAROTID SUBMANDIBULAR GLANDS    COMPARISON: None.    FINDINGS: The submandibular and parotid glands on each side demonstrate normal  sonographic appearance. The glandular parenchyma is homogeneous.  A 0.6 cm in the greatest dimension hypoechoic nodule in the deep left parotid  gland likely represents a tiny intraglandular lymph node. No other mass is  identified.  Exam End: 12/21/21  3:22 PM    Specimen Collected: 12/21/21  3:27 PM Last Resulted: 12/21/21  3:31 PM   Received From: [facility]  Result Received: 05/09/23  1:30 PM      EXAM: US THYROID/PARATHYROID  LOCATION: [facility]  DATE/TIME: 8/21/2020 3:10 PM    INDICATION: Thyroid Cyst  COMPARISON: CT chest 08/14/2020  TECHNIQUE: Thyroid ultrasound.    FINDINGS:  RIGHT lobe: 1.5 x 1.1 x 1.5 cm. Homogeneous echotexture. Tiny 3 mm cyst is noted  within the interpolar region of the right lobe of the thyroid.  Isthmus: 2 mm.  LEFT lobe: 5 x 0.9 x 1.4 cm. Homogeneous echotexture.    NECK: No cervical lymphadenopathy. 3.2 x 2.1 x 1.8 cm cyst is noted inferior to  the left lobe of the thyroid.    NODULES:    IMPRESSION:  1.  3.2 x 2.1 x 1.8 cm cyst is noted inferior to the left lobe of the thyroid.      Nodules are characterized per  ACR Thyroid  Imaging, Reporting and Data System (TI-RADS): White Paper of the ACR  TI-RADS Committee  Jeremy Bedoya et al. Journal of the American College of Radiology 2017.  Volume 14 (2017), Issue 5, 577-881.  Exam End: 08/21/20  3:10 PM    Specimen Collected: 08/21/20  3:10 PM Last Resulted: 08/21/20  3:29 PM   Received From: [facility]  Result Received: 05/09/23  1:26 PM      Component      Latest Ref Rng 8/15/2024  8:05 AM   WBC      4.0 - 11.0 10e3/uL 4.7    RBC Count      3.80 - 5.20 10e6/uL 4.85    Hemoglobin      11.7 - 15.7 g/dL 14.8    Hematocrit      35.0 - 47.0 % 44.1    MCV      78 - 100 fL 91    MCH      26.5 - 33.0 pg 30.5    MCHC      31.5 - 36.5 g/dL 33.6    RDW      10.0 - 15.0 % 12.5    Platelet Count      150 - 450 10e3/uL 267    % Neutrophils      % 55    % Lymphocytes      % 36    % Monocytes      % 6    % Eosinophils      % 3    % Basophils      % 0    % Immature Granulocytes      % 0    Absolute Neutrophils      1.6 - 8.3 10e3/uL 2.6    Absolute Lymphocytes      0.8 - 5.3 10e3/uL 1.7    Absolute Monocytes      0.0 - 1.3 10e3/uL 0.3    Absolute Eosinophils      0.0 - 0.7 10e3/uL 0.1    Absolute Basophils      0.0 - 0.2 10e3/uL 0.0    Absolute Immature Granulocytes      <=0.4 10e3/uL 0.0    Sodium      135 - 145 mmol/L 139    Potassium      3.4 - 5.3 mmol/L 4.6    Carbon Dioxide (CO2)      22 - 29 mmol/L 26    Anion Gap      7 - 15 mmol/L 9    Urea Nitrogen      8.0 - 23.0 mg/dL 13.9    Creatinine      0.51 - 0.95 mg/dL 0.89    GFR Estimate      >60 mL/min/1.73m2 68    Calcium      8.8 - 10.4 mg/dL 9.4    Chloride      98 - 107 mmol/L 104    Glucose      70 - 99 mg/dL 101 (H)    Alkaline Phosphatase      40 - 150 U/L 122    AST      0 - 45 U/L 27    ALT      0 - 50 U/L 16    Protein Total      6.4 - 8.3 g/dL 7.0    Albumin      3.5 - 5.2 g/dL 4.1    Bilirubin Total      <=1.2 mg/dL 0.3    Vitamin D, Total (25-Hydroxy)      20 - 50 ng/mL 73 (H)    Beta 2 Glycoprotein  1 Antibody IgA      <7.0 U/mL 6.8       Legend:  (H) High  PMHx:    Sjogren syndrome (+RUSLAN 1:320, speckled, +Ro60 234, +SSA 4.6, weak +APLA 18.4)  - RF-, C3/C4 normal, dsDNA-, Ig normal, HCV-, SPEP normal    Burning dysesthesia on face      Anhidrosis   -  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 SHe thinks that is since childhood.  - Other autonomic reflex screen was minimally abnormal. Isolated finding of focal reduced sweat response to the proximal leg with normal cardiovagal and adrenergic reflexes, which was of uncertain significance.   - Brain MRI wnl.    Rosacea     Osteopenia      S/p Hysterectomy with bilateral salpingo-oophorectomy for endometriosis, age 36.    Chronic stable ground glass opacities rt lower lobe     Hx of vitamin D deficiency     Diminished thyroid uptake on left compared to rt on scan         Past Surgical History:   Procedure Laterality Date    HYSTERECTOMY      OOPHORECTOMY       Family History   Problem Relation Age of Onset    Breast Cancer No family hx of    No FHx of diagnosed autoimmune conditions    Social History     Socioeconomic History    Marital status:      Spouse name: Not on file    Number of children: Not on file    Years of education: Not on file    Highest education level: Not on file   Occupational History    Not on file   Tobacco Use    Smoking status: Never    Smokeless tobacco: Never   Substance and Sexual Activity    Alcohol use: Not on file    Drug use: Not on file    Sexual activity: Not on file   Other Topics Concern    Not on file   Social History Narrative    Not on file     Social Drivers of Health     Financial Resource Strain: Low Risk  (3/8/2022)    Received from [facility], [facility]    Financial Resource Strain     Difficulty of Paying Living Expenses: 3     Difficulty of Paying Living Expenses: Not on file   Food Insecurity: No Food Insecurity  (3/8/2022)    [facility]    Food Insecurity     Worried About Running Out of Food in the Last Year: 1   Transportation Needs: No Transportation Needs (3/8/2022)    [facility], [facility] [facility]    Transportation Needs     Lack of Transportation (Medical): 1   Physical Activity: Insufficiently Active (1/13/2022)    Received from [facility]    Exercise Vital Sign     Days of Exercise per Week: 2 days     Minutes of Exercise per Session: 30 min   Stress: No Stress Concern Present (1/13/2022)    Received from [facility]    Citizen of [place] [employer] - Occupational Stress Questionnaire     Feeling of Stress : Only a little   Social Connections: Unknown (3/16/2023)    Received from [facility], [facility] [employer] [facility]    Social Connections     Frequency of Communication with Friends and Family: Not on file   Interpersonal Safety: Not At Risk (1/13/2022)    Received from [facility]    Humiliation, Afraid, Rape, and Kick questionnaire     Fear of Current or Ex-Partner: No     Emotionally Abused: No     Physically Abused: No     Sexually Abused: No   Housing Stability: Low Risk  (3/8/2022)    [facility]    Housing Stability     Unable to Pay for Housing in the Last Year: 1     Patient Active Problem List   Diagnosis    Osteopenia    Postsurgical Acquired Absence Of Genitalia     Allergies   Allergen Reactions    Codeine     Fish Oil GI Disturbance    Hydroxychloroquine Other (See Comments) and GI Disturbance    Milk (Cow) GI Disturbance    No Clinical Screening - See Comments Other (See Comments)     Triminycine per patient    Penicillins     Soybean Oil GI Disturbance       Outpatient Encounter  "Medications as of 11/29/2024   Medication Sig Dispense Refill    acetaminophen (TYLENOL) 500 MG tablet Take 1,000 mg by mouth.      aspirin (ASA) 81 MG EC tablet Take 81 mg by mouth daily      Azelastine HCl 137 MCG/SPRAY SOLN   1    Calcium Carbonate Antacid 1177 MG CHEW       cetirizine (ZYRTEC) 10 MG tablet Take 5 mg by mouth      cyanocobalamin (VITAMIN B-12) 2500 MCG SUBL sublingual tablet Place under the tongue once daily.      cycloSPORINE (RESTASIS) 0.05 % ophthalmic emulsion Apply 1 drop to eye      dextromethorphan (TUSSIN COUGH) 15 MG/5ML syrup Take 10 mLs by mouth 4 times daily as needed for cough.      diclofenac (VOLTAREN) 1 % topical gel       fluticasone (FLONASE) 50 MCG/ACT nasal spray Spray 1 spray in nostril      gabapentin (NEURONTIN) 100 MG capsule Take 3 capsules (300 mg) by mouth at bedtime 90 capsule 11    lidocaine (LMX4) 4 % external cream Apply topically once as needed for mild pain 45 g 1    metroNIDAZOLE (METROCREAM) 0.75 % external cream Apply topically 2 times daily. 45 g 11    metroNIDAZOLE (METROCREAM) 0.75 % external cream Apply topically daily 45 g 11    modafinil (PROVIGIL) 100 MG tablet Take 1 tablet (100 mg) by mouth daily 30 tablet 5    mometasone (ELOCON) 0.1 % external cream   11    oxymetazoline (AFRIN) 0.05 % nasal spray Spray 2 sprays into both nostrils 2 times daily.      pilocarpine (SALAGEN) 5 MG tablet Take 1 tablet (5 mg) by mouth 4 times daily 120 tablet 11    pimecrolimus (ELIDEL) 1 % external cream Apply topically 2 times daily 60 g 4    probiotic CAPS Take 1 capsule by mouth.      VITAMIN D-VITAMIN K PO Vitamin D3 and Vitamin K2      modafinil (PROVIGIL) 100 MG tablet Take 1 tablet (100 mg) by mouth daily 30 tablet 5     No facility-administered encounter medications on file as of 11/29/2024.             Ph.E:    /77 (BP Location: Right arm)   Pulse 64   Temp 97.4  F (36.3  C)   Ht 1.6 m (5' 3\")   Wt 68 kg (150 lb)   SpO2 96%   BMI 26.57 kg/m  "       Constitutional: WD/WN. Pleasant. In no acute distress.  present.  Eyes: EOM intact, sclera anicteric, conj not injected  HEENT: No oral ulcers or thrush. Poor salivary pool. No parotid gland enlargement.  Neck: No cervical LAP or thyromegaly  Chest: Clear to auscultation bilaterally  CV: RRR, no murmurs/ rubs or gallops. No edema, clubbing or cyanosis.   GI: Abdomen is soft and non tender.   MS: No synovitis. Cool joints. OA changes of hands  Skin: morphea over forehead (unchanged, stable), large erosive patch over back of L hand with surrounds erythema and edema and no discharge (s/p cryo tx 11/21)  Neuro: A&O x 3. Grossly non focal  Psych: NL affect    Assessment/ plan:    5/2024      1. Sjogren's. Discussed Dx, Mx. Reviewed labs. Fatigue is better, could hold off trying provigil. Was advised to try pilocarpine for dental health; risks were discussed.    2. R hip bursitis. Injected 2/2024, pain resolved. Was advised to do stretching exercises.    3. Thyroid nodule. Neck US showed smaller nodule with no need for FNA or follow up.    4. Parotid gland cyst. US showed cysts, LNs. Will refer to ENT for long term follow up.    5. Morphea. Failed AZA, did not tolerate HCQ (dizziness, hair loss) or methotrexate (liver abnormality). Stable off methotrexate. Recommend follow up with rheum-derm, cellcept could be an option in future if it gets worse. LFTs are normal now off methotrexate    6. Borderline positive beta 2 GP I IgA. Unclear significance, will re-check, no need for anti-coagulation. Rest of APLA were negative.    7. High vit D 75, but cutting back on vit D caused leg cramps, she is going to try taking more, will re-check vit D to make sure it does not go very high    Plan:        Go up on vit D to 5 tabs of 5000 units a week    Re-check vit D, beta 2 GPI IgA in 3 months    Refer to ENT    Try pilocarpine    Hip bursitis exercises    Return in 6 months in person      Today 11/29/2024:    Today we  discussed the nature of Sjogren's Dx, Mx. The fact that there is no FDA approved to treat Sjogren's or prevent progression of disease or prevention of lymphoma. She is stable, and has no RFs for lymphoma (nl C4, neg cryo, no SPEP). I reviewed imaging, no concerning LNs. She asked about AZA, I told her that it would not prevent lymphoma, in fact could increase risk of lymphoma. I recommend to monitor sx, labs for now.       She had cryo tx over L hand on 11/21, noticed weeping which stopped today, pain/swelling. This is unusual for her, she had cryo tx over R hand before. She is heading airport to travel to [place]. I took pictures (under media tab) and reached out to dermatology team to address her concern, need for antibiotic. The on call derm called her with plan of care.    Plan 11/29/2024:    Labs in 2/2025    Return in a year in person    Orders Placed This Encounter   Procedures    ALT    Albumin level    AST    Creatinine    Complement C4    Complement C3    CRP inflammation    Erythrocyte sedimentation rate auto    UA with Microscopic reflex to Culture    Protein  random urine    Creatinine random urine    Cryoglobulin, Quantitative with Reflex to Identification    Vitamin D Deficiency    CBC with Platelets & Differential    Protein electrophoresis         TT 40 min was spent on date of the encounter doing chart review, history and exam, documentation and further activities as noted above. Any prior notes, outside records, laboratory results, and imaging studies were reviewed if relevant.      The longitudinal plan of care for the diagnosis(es)/condition(s) as documented were addressed during this visit. Due to the added complexity in care, I will continue to support Pily in the subsequent management and with ongoing continuity of care.      Annabel Parnell MD

## 2024-11-29 NOTE — TELEPHONE ENCOUNTER
Impression/Plan: 11/21/2024     1. Linear morphea (en coup de sabre), forehead.  No worsening of symptoms off systemic and topical therapies. Exam today is reassuring without erythema or induration.   - Monitor for worsening.  - OK to use topical discoloration serum rec'd by Dr. Vences.  - Fine to trial cosmetic treatments like filler with Dr. Vences.     2. Seborrheic keratosis, symptomatic.  - Cryotherapy procedure note: After verbal consent and discussion of risks and benefits including but no limited to dyspigmentation/scar, blister, and pain, 8 was(were) treated on the left hand with 1-2mm freeze border for 2 cycles with liquid nitrogen. Post cryotherapy instructions were provided.     3. Burning dysesthesia on face.  - Continue gabapentin 100 mg daily     4. Rosacea.  - Discussed avoiding food triggers such as spicy foods. Discussed   - Continue pimecrolimus BID on face.  - Apply metronidazole cream 0.075% twice daily on face.        Follow-up in 6 months.

## 2024-11-29 NOTE — TELEPHONE ENCOUNTER
Reviewed photo and called patient's number to check on the symptoms. Was able to speak with patient's family who is a physician.   Top part of epidermis has sloughed off and she is having pain/irritation around the area post cryotherapy.   Currently Pily is applying vaseline and covering up the area. No signs of systemic symptoms. Erythema not beyond the cryotherapy area.   Discussed signs to watch out for (erythema extending beyond the procedure site, swelling, severe pain, exudates). For now continue the current regimen until the top part of the skin heals back up.   Patient's family said that they have emergency doxycycline that they'll take with their trip in case symptoms get worse.     Resident on call   Saleem Luque MD MPH   PGY-2 Internal Medicine / Dermatology (#[id number])  [facility]

## 2024-11-29 NOTE — TELEPHONE ENCOUNTER
Patient had an appointment today with Dr. Parnell in Rheumatology.    Patient is concerned that area treated with cryo on 11/21 is possibly infected. She did have blistering and has been using Vaseline and leaving the area open to air.     She notes redness and pain. Picture was sent on 11/27 and then updated pictures taken today, 11/29/24 by Dr. Parnell.    Tabatha Wallace LPN

## 2024-12-04 ENCOUNTER — MYC MEDICAL ADVICE (OUTPATIENT)
Dept: DERMATOLOGY | Facility: CLINIC | Age: 74
End: 2024-12-04
Payer: COMMERCIAL

## 2024-12-09 NOTE — TELEPHONE ENCOUNTER
This is not likely to scar, but there may be some dyspigmentation that takes a number of months to resolve. I apologize if the freezing was more than anticipated.

## 2024-12-21 ENCOUNTER — HEALTH MAINTENANCE LETTER (OUTPATIENT)
Age: 74
End: 2024-12-21

## 2025-02-13 ENCOUNTER — LAB (OUTPATIENT)
Dept: LAB | Facility: CLINIC | Age: 75
End: 2025-02-13
Payer: COMMERCIAL

## 2025-02-13 DIAGNOSIS — E55.9 VITAMIN D DEFICIENCY, UNSPECIFIED: ICD-10-CM

## 2025-02-13 DIAGNOSIS — M35.00 PRIMARY SJOGREN'S SYNDROME: ICD-10-CM

## 2025-02-13 LAB
ALBUMIN UR-MCNC: NEGATIVE MG/DL
APPEARANCE UR: CLEAR
BACTERIA #/AREA URNS HPF: ABNORMAL /HPF
BASOPHILS # BLD AUTO: 0 10E3/UL (ref 0–0.2)
BASOPHILS NFR BLD AUTO: 0 %
BILIRUB UR QL STRIP: NEGATIVE
COLOR UR AUTO: YELLOW
EOSINOPHIL # BLD AUTO: 0.1 10E3/UL (ref 0–0.7)
EOSINOPHIL NFR BLD AUTO: 2 %
ERYTHROCYTE [DISTWIDTH] IN BLOOD BY AUTOMATED COUNT: 12.4 % (ref 10–15)
ERYTHROCYTE [SEDIMENTATION RATE] IN BLOOD BY WESTERGREN METHOD: 10 MM/HR (ref 0–30)
GLUCOSE UR STRIP-MCNC: NEGATIVE MG/DL
HCT VFR BLD AUTO: 44.5 % (ref 35–47)
HGB BLD-MCNC: 15.2 G/DL (ref 11.7–15.7)
HGB UR QL STRIP: ABNORMAL
IMM GRANULOCYTES # BLD: 0 10E3/UL
IMM GRANULOCYTES NFR BLD: 0 %
KETONES UR STRIP-MCNC: NEGATIVE MG/DL
LEUKOCYTE ESTERASE UR QL STRIP: ABNORMAL
LYMPHOCYTES # BLD AUTO: 1.7 10E3/UL (ref 0.8–5.3)
LYMPHOCYTES NFR BLD AUTO: 34 %
MCH RBC QN AUTO: 30.5 PG (ref 26.5–33)
MCHC RBC AUTO-ENTMCNC: 34.2 G/DL (ref 31.5–36.5)
MCV RBC AUTO: 89 FL (ref 78–100)
MONOCYTES # BLD AUTO: 0.3 10E3/UL (ref 0–1.3)
MONOCYTES NFR BLD AUTO: 7 %
NEUTROPHILS # BLD AUTO: 2.9 10E3/UL (ref 1.6–8.3)
NEUTROPHILS NFR BLD AUTO: 57 %
NITRATE UR QL: NEGATIVE
PH UR STRIP: 7 [PH] (ref 5–7)
PLATELET # BLD AUTO: 275 10E3/UL (ref 150–450)
RBC # BLD AUTO: 4.99 10E6/UL (ref 3.8–5.2)
RBC #/AREA URNS AUTO: ABNORMAL /HPF
SP GR UR STRIP: 1.01 (ref 1–1.03)
SQUAMOUS #/AREA URNS AUTO: ABNORMAL /LPF
UROBILINOGEN UR STRIP-ACNC: 0.2 E.U./DL
WBC # BLD AUTO: 5 10E3/UL (ref 4–11)
WBC #/AREA URNS AUTO: ABNORMAL /HPF

## 2025-02-18 LAB — CRYOGLOB SER QL: NEGATIVE

## 2025-02-20 DIAGNOSIS — M35.00 PRIMARY SJOGREN'S SYNDROME: Primary | ICD-10-CM

## 2025-02-26 ENCOUNTER — LAB (OUTPATIENT)
Dept: LAB | Facility: CLINIC | Age: 75
End: 2025-02-26
Payer: COMMERCIAL

## 2025-02-26 DIAGNOSIS — M35.00 PRIMARY SJOGREN'S SYNDROME: ICD-10-CM

## 2025-02-26 LAB
CHOLEST SERPL-MCNC: 196 MG/DL
FASTING STATUS PATIENT QL REPORTED: YES
HDLC SERPL-MCNC: 53 MG/DL
LDLC SERPL CALC-MCNC: 122 MG/DL
NONHDLC SERPL-MCNC: 143 MG/DL
TRIGL SERPL-MCNC: 107 MG/DL

## 2025-02-26 PROCEDURE — 36415 COLL VENOUS BLD VENIPUNCTURE: CPT

## 2025-02-26 PROCEDURE — 80061 LIPID PANEL: CPT

## 2025-02-26 PROCEDURE — 86146 BETA-2 GLYCOPROTEIN ANTIBODY: CPT

## 2025-03-03 LAB — B2 GLYCOPROT1 IGA SER-ACNC: 9.5 U/ML

## 2025-04-16 NOTE — PROGRESS NOTES
[facility] Dermatology Note  Encounter Date: Apr 17, 2025    Dermatology Problem List:  1. 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  2. Sjogren syndrome (+RUSLAN 1:320, speckled, +Ro60 234, +SSA 4.6, weak +APLA 18.4)  - RF-, C3/C4 normal, dsDNA-, Ig normal, HCV-, SPEP normal  - prior: hydroxychloroquine (nausea, hair loss, dizziness)  - Salivary glands scan wnl  3. Burning dysesthesia on face  - current tx: gabapentin 100 mg daily  4. Anhidrosis   -  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 SHe thinks that is since childhood.  - Other autonomic reflex screen was minimally abnormal. Isolated finding of focal reduced sweat response to the proximal leg with normal cardiovagal and adrenergic reflexes, which was of uncertain significance.   - Brain MRI wnl.  5. Rosacea   - current tx: pimecrolimus 1% cream PRN, clindamycin 1% lotion  - prior tx: doxycycline, Metronidazole 0.75% cream  6. Osteopenia  7. S/p Hysterectomy with bilateral salpingo-oophorectomy for endometriosis, age 36.  8. 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 Hx of vitamin D deficiency   10. Diminished thyroid uptake on left compared to rt on scan  11. SK  - cryo cheeks and hands 5/8/24, 11/21/24.  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 Linear morphea (en coup de sabre), forehead, chronic and stable. Discussed restarting pimecrolimus when flares occur or a course of prednisone, which patient will reach out via MyChart if a more severe flare occurs to restart. Discussed pros/cons of using multivitamins, Vitamin D, and Vitamin B for therapeutic treatment.   - Monitor for worsening.  - hold off on restarting pimecrolimus 1% cream PRN until  flaring occurs    2. Burning dysesthesia on face, not addressed today.  - Continue gabapentin 100 mg daily    3. Rosacea. Also risks/benefits of starting clindamycin vs doxycycline.  - Discussed avoiding food triggers such as spicy foods  - start Clindamycin 1% lotion BID    Follow-up in 6 months, in person, or sooner for new or changing lesions.      Staff Involved:  Scribe Disclosure:   By signing my name below, I, Macrina Servin, attest that this documentation has been prepared under the direction and in the presence of Eris Araujo MD.  - Electronically Signed: Macrina Servin 04/16/25       Provider Disclosure:   The documentation recorded by the scribe accurately reflects the services I personally performed and the decisions made by me.    Eris Araujo MD   of Dermatology  Department of Dermatology  [facility]      CC:   Derm Problem (ECDS - Pily states her hand is still red. She states there are more bumps on her face. Morphea has been stable but red at times. )      History of Present Illness:  Ms. Pily Mejia is a 75 year old female who presents as a return patient for linear morphea, Sjogren's syndrome, dysthesia of the face, and rosacea.     Patient reports she is doing well and her hand took some time to heal, currently still presenting some inflammation. She had an appointment with Dr. Parnell and had testing due to high labs. She believes it may be associated with her Rosacea or Sjogren's. She has a breakout on her chin, and notes the addition of Metronidazole has not seemed to helped.    Patient reports her morphea is doing well today but will now have to apply a lot of makeup when there are flares, however the flaring/bumps will resolve spontaneously after two days.    Patient is otherwise feeling well, without additional skin concerns.     Labs:  02/2025 creatinine, ALT, AST, CRP, CBC, C3, C4, SPEP, Beta 2 glycoprotein     Physical  exam:  Vitals: There were no vitals taken for this visit.  GEN: This is a well developed, well-nourished female in no acute distress, in a pleasant mood.    SKIN: Russell phototype II  - Focused examination of the face was performed.  - midfacial mild erythema  - faint hyperpigmentation on L medial brow  - No other lesions of concern on areas examined.       Past Medical History:   No past medical history on file.  Past Surgical History:   Procedure Laterality Date    HYSTERECTOMY  1985    OOPHORECTOMY  1985       Social History:   reports that she has never smoked. She has never used smokeless tobacco.    Family History:  Family History   Problem Relation Age of Onset    Breast Cancer No family hx of        Medications:  Current Outpatient Medications   Medication Sig Dispense Refill    acetaminophen (TYLENOL) 500 MG tablet Take 1,000 mg by mouth.      aspirin (ASA) 81 MG EC tablet Take 81 mg by mouth daily      Azelastine HCl 137 MCG/SPRAY SOLN   1    Calcium Carbonate Antacid 1177 MG CHEW       cetirizine (ZYRTEC) 10 MG tablet Take 5 mg by mouth      cyanocobalamin (VITAMIN B-12) 2500 MCG SUBL sublingual tablet Place under the tongue once daily.      cycloSPORINE (RESTASIS) 0.05 % ophthalmic emulsion Apply 1 drop to eye      dextromethorphan (TUSSIN COUGH) 15 MG/5ML syrup Take 10 mLs by mouth 4 times daily as needed for cough.      diclofenac (VOLTAREN) 1 % topical gel       fluticasone (FLONASE) 50 MCG/ACT nasal spray Spray 1 spray in nostril      gabapentin (NEURONTIN) 100 MG capsule Take 3 capsules (300 mg) by mouth at bedtime 90 capsule 11    lidocaine (LMX4) 4 % external cream Apply topically once as needed for mild pain 45 g 1    metroNIDAZOLE (METROCREAM) 0.75 % external cream Apply topically 2 times daily. 45 g 11    modafinil (PROVIGIL) 100 MG tablet Take 1 tablet (100 mg) by mouth daily 30 tablet 5    mometasone (ELOCON) 0.1 % external cream   11    oxymetazoline (AFRIN) 0.05 % nasal spray Spray 2  sprays into both nostrils 2 times daily.      pilocarpine (SALAGEN) 5 MG tablet Take 1 tablet (5 mg) by mouth 4 times daily 120 tablet 11    pimecrolimus (ELIDEL) 1 % external cream Apply topically 2 times daily 60 g 4    probiotic CAPS Take 1 capsule by mouth.      VITAMIN D-VITAMIN K PO Vitamin D3 and Vitamin K2      metroNIDAZOLE (METROCREAM) 0.75 % external cream Apply topically daily (Patient not taking: Reported on 4/17/2025) 45 g 11     Allergies   Allergen Reactions    Codeine     Fish Oil GI Disturbance    Hydroxychloroquine Other (See Comments) and GI Disturbance    Milk (Cow) GI Disturbance    No Clinical Screening - See Comments Other (See Comments)     Triminycine per patient    Penicillins     Soybean Oil GI Disturbance

## 2025-04-17 ENCOUNTER — OFFICE VISIT (OUTPATIENT)
Dept: DERMATOLOGY | Facility: CLINIC | Age: 75
End: 2025-04-17
Payer: COMMERCIAL

## 2025-04-17 ENCOUNTER — TELEPHONE (OUTPATIENT)
Dept: DERMATOLOGY | Facility: CLINIC | Age: 75
End: 2025-04-17

## 2025-04-17 DIAGNOSIS — L94.1 LINEAR MORPHEA: Primary | ICD-10-CM

## 2025-04-17 DIAGNOSIS — R20.8 DYSESTHESIA OF FACE: ICD-10-CM

## 2025-04-17 DIAGNOSIS — L71.9 ROSACEA: ICD-10-CM

## 2025-04-17 DIAGNOSIS — M35.09 SJOGREN'S SYNDROME WITH OTHER ORGAN INVOLVEMENT: ICD-10-CM

## 2025-04-17 RX ORDER — PIMECROLIMUS 10 MG/G
CREAM TOPICAL 2 TIMES DAILY
Qty: 60 G | Refills: 4 | Status: SHIPPED | OUTPATIENT
Start: 2025-04-17

## 2025-04-17 RX ORDER — CLINDAMYCIN PHOSPHATE 10 UG/ML
LOTION TOPICAL 2 TIMES DAILY
Qty: 60 ML | Refills: 11 | Status: SHIPPED | OUTPATIENT
Start: 2025-04-17

## 2025-04-17 ASSESSMENT — PAIN SCALES - GENERAL: PAINLEVEL_OUTOF10: NO PAIN (0)

## 2025-04-17 NOTE — LETTER
May 2, 2025      [URGENT]: Appeals & Grievances                                      Re: Prior Authorization Request   Plan: [employer]  Member ID: [id number]  Patient: Pily Mejia  YOB: 1950    To whom it may concern:     I am contacting you as a dermatologist caring for Pily Mejia regarding their diagnosis of rosacea (L71.9). I recently prescribed my patient pimecrolimus 1% cream. Unfortunately, the prior authorization was denied, and the patient was unable to fill their prescription. I have reviewed the patient's diagnosis, care plan and clinical guidelines for treatment and request a formal appeal of your denial for pimecrolimus 1% cream. The reason for denial stated in your letter is that topical pimecrolimus must be used for a medically accepted indication.    Pily has tried and failed treatments with tacrolimus 0.1% ointment (greasy, burning), doxycycline, metronidazole 0.75% cream, and clindamycin 1% lotion. Despite this, my patient continued to experience persistent inflammatory lesions and significant discomfort. Pily has been stable with pimecrolimus 1% cream for that past year to manage her rosacea. She is experiencing a positive clinical benefit with minimal side effects. Therefore, I believe pimecrolimus 1% cream is medically necessary to continue treating her rosacea.     When treating a patient with rosacea, it is necessary to have access to the full spectrum of accepted treatments as patients may not be able to use one treatment due to lack of response, the potential for side effects or even an allergic reaction. It can become a serious safety issue for the patient if I am not able to prescribe a wide variety of treatments for this condition.     Rosacea is a chronic inflammatory skin condition that primarily presents as facial erythema, burning, and irritation that can be persistent and uncomfortable. The underlying pathophysiology of rosacea involves immune dysregulation and  neurovascular dysfunction, for which topical calcineurin inhibitors like pimecrolimus cream offer therapeutic benefit.     Pimecrolimus 1% cream is FDA-approved for mild to moderate atopic dermatitis in children 2 years and older in the [place].  Given its favorable safety profile and steroid-sparing nature, it is a rational alternative for patients with sensitive skin or refractory cases. Additionally, it has demonstrated off-label benefit in treating rosacea by targeting the inflammatory pathways involved (see references below).    On behalf of Pily, I would appreciate your prompt reconsideration of this denial. This treatment offers a safe, evidence-based, and patient-appropriate option to manage a chronic and distressing dermatologic condition. I look forward to receiving your response and approval of coverage for this medication.       Sincerely,      Eris Araujo MD      References:    Janet MADISON, Mendoza NETTLES, Alfonso ROTH, et al. Rosacea treatment update: recommendations from the global ROSacea COnsensus (NICOLE) panel. Br J Dermatol. 2017 Feb;176(2):465-471. doi: 10.1111/bjd.17758. Epub 2017 Feb 5. PMID: 53065166.  Hitesh G, Jay GP. Clinical efficacy of tacrolimus in rosacea. J Eur Acad Dermatol Venereol. 2009;23(2):239. Epub 2008 May 21.  Leanne J, Yoni B, Tran I. Tacrolimus effect on rosacea. Journal of the American Academy of Dermatology. 2004;50(1):107-108.  Marissa MB, Marissa GW, Park HJ, et al. Pimecrolimus 1% cream for the treatment of rosacea. J Dermatol. 2011 Dec;38(12):1135-9. doi: 10.1111/j.1822-8084.2011.53782.x. Epub 2011 Sep 28. PMID: 16197270.  Charline M, Dina V, Nichole A, Jones J. Off-label Uses of Topical Pimecrolimus. J Cutan Med Surg. 2019 Jul/Aug;23(4):442-448. doi: 10.1177/7805843820385761. Epub 2019 May 3. PMID: 38986913.

## 2025-04-17 NOTE — NURSING NOTE
Dermatology Rooming Note    Pily Mejia's goals for this visit include:   Chief Complaint   Patient presents with    Derm Problem     ECDS - Pily states her hand is still red. She states there are more bumps on her face. Morphea has been stable but red at times.      Oswald HICKS, GIBSON

## 2025-04-17 NOTE — LETTER
4/17/2025       RE: Pily Mejia  [address] [address]     Dear Colleague,    Thank you for referring your patient, Pily Mejia, to the [facility] DERMATOLOGY [facility] [place] [facility]. Please see a copy of my visit note below.    [facility] Dermatology Note  Encounter Date: Apr 17, 2025    Dermatology Problem List:  1. 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  2. Sjogren syndrome (+RUSLAN 1:320, speckled, +Ro60 234, +SSA 4.6, weak +APLA 18.4)  - RF-, C3/C4 normal, dsDNA-, Ig normal, HCV-, SPEP normal  - prior: hydroxychloroquine (nausea, hair loss, dizziness)  - Salivary glands scan wnl  3. Burning dysesthesia on face  - current tx: gabapentin 100 mg daily  4. Anhidrosis   -  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 SHe thinks that is since childhood.  - Other autonomic reflex screen was minimally abnormal. Isolated finding of focal reduced sweat response to the proximal leg with normal cardiovagal and adrenergic reflexes, which was of uncertain significance.   - Brain MRI wnl.  5. Rosacea   - current tx: pimecrolimus 1% cream PRN, clindamycin 1% lotion  - prior tx: doxycycline, Metronidazole 0.75% cream  6. Osteopenia  7. S/p Hysterectomy with bilateral salpingo-oophorectomy for endometriosis, age 36.  8. 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 Hx of vitamin D deficiency   10. Diminished thyroid uptake on left compared to rt on scan  11. SK  - cryo cheeks and hands 5/8/24, 11/21/24.  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 Linear morphea (en coup de sabre), forehead, chronic and stable. Discussed restarting  pimecrolimus when flares occur or a course of prednisone, which patient will reach out via MyChart if a more severe flare occurs to restart. Discussed pros/cons of using multivitamins, Vitamin D, and Vitamin B for therapeutic treatment.   - Monitor for worsening.  - hold off on restarting pimecrolimus 1% cream PRN until flaring occurs    2. Burning dysesthesia on face, not addressed today.  - Continue gabapentin 100 mg daily    3. Rosacea. Also risks/benefits of starting clindamycin vs doxycycline.  - Discussed avoiding food triggers such as spicy foods  - start Clindamycin 1% lotion BID    Follow-up in 6 months, in person, or sooner for new or changing lesions.      Staff Involved:  Scribe Disclosure:   By signing my name below, I, Macrina Knappsusanajoshua, attest that this documentation has been prepared under the direction and in the presence of Eris Araujo MD.  - Electronically Signed: Macrina Servin 04/16/25       Provider Disclosure:   The documentation recorded by the scribe accurately reflects the services I personally performed and the decisions made by me.    Eris Araujo MD   of Dermatology  Department of Dermatology  [facility]      CC:   Derm Problem (ECDS - Pily states her hand is still red. She states there are more bumps on her face. Morphea has been stable but red at times. )      History of Present Illness:  Ms. Pily Mejia is a 75 year old female who presents as a return patient for linear morphea, Sjogren's syndrome, dysthesia of the face, and rosacea.     Patient reports she is doing well and her hand took some time to heal, currently still presenting some inflammation. She had an appointment with Dr. Parnell and had testing due to high labs. She believes it may be associated with her Rosacea or Sjogren's. She has a breakout on her chin, and notes the addition of Metronidazole has not seemed to helped.    Patient reports her morphea is doing  well today but will now have to apply a lot of makeup when there are flares, however the flaring/bumps will resolve spontaneously after two days.    Patient is otherwise feeling well, without additional skin concerns.     Labs:  02/2025 creatinine, ALT, AST, CRP, CBC, C3, C4, SPEP, Beta 2 glycoprotein     Physical exam:  Vitals: There were no vitals taken for this visit.  GEN: This is a well developed, well-nourished female in no acute distress, in a pleasant mood.    SKIN: Russell phototype II  - Focused examination of the face was performed.  - midfacial mild erythema  - faint hyperpigmentation on L medial brow  - No other lesions of concern on areas examined.       Past Medical History:   No past medical history on file.  Past Surgical History:   Procedure Laterality Date     HYSTERECTOMY  1985     OOPHORECTOMY  1985       Social History:   reports that she has never smoked. She has never used smokeless tobacco.    Family History:  Family History   Problem Relation Age of Onset     Breast Cancer No family hx of        Medications:  Current Outpatient Medications   Medication Sig Dispense Refill     acetaminophen (TYLENOL) 500 MG tablet Take 1,000 mg by mouth.       aspirin (ASA) 81 MG EC tablet Take 81 mg by mouth daily       Azelastine HCl 137 MCG/SPRAY SOLN   1     Calcium Carbonate Antacid 1177 MG CHEW        cetirizine (ZYRTEC) 10 MG tablet Take 5 mg by mouth       cyanocobalamin (VITAMIN B-12) 2500 MCG SUBL sublingual tablet Place under the tongue once daily.       cycloSPORINE (RESTASIS) 0.05 % ophthalmic emulsion Apply 1 drop to eye       dextromethorphan (TUSSIN COUGH) 15 MG/5ML syrup Take 10 mLs by mouth 4 times daily as needed for cough.       diclofenac (VOLTAREN) 1 % topical gel        fluticasone (FLONASE) 50 MCG/ACT nasal spray Spray 1 spray in nostril       gabapentin (NEURONTIN) 100 MG capsule Take 3 capsules (300 mg) by mouth at bedtime 90 capsule 11     lidocaine (LMX4) 4 % external cream  Apply topically once as needed for mild pain 45 g 1     metroNIDAZOLE (METROCREAM) 0.75 % external cream Apply topically 2 times daily. 45 g 11     modafinil (PROVIGIL) 100 MG tablet Take 1 tablet (100 mg) by mouth daily 30 tablet 5     mometasone (ELOCON) 0.1 % external cream   11     oxymetazoline (AFRIN) 0.05 % nasal spray Spray 2 sprays into both nostrils 2 times daily.       pilocarpine (SALAGEN) 5 MG tablet Take 1 tablet (5 mg) by mouth 4 times daily 120 tablet 11     pimecrolimus (ELIDEL) 1 % external cream Apply topically 2 times daily 60 g 4     probiotic CAPS Take 1 capsule by mouth.       VITAMIN D-VITAMIN K PO Vitamin D3 and Vitamin K2       metroNIDAZOLE (METROCREAM) 0.75 % external cream Apply topically daily (Patient not taking: Reported on 4/17/2025) 45 g 11     Allergies   Allergen Reactions     Codeine      Fish Oil GI Disturbance     Hydroxychloroquine Other (See Comments) and GI Disturbance     Milk (Cow) GI Disturbance     No Clinical Screening - See Comments Other (See Comments)     Triminycine per patient     Penicillins      Soybean Oil GI Disturbance         Again, thank you for allowing me to participate in the care of your patient.      Sincerely,    Eris Araujo MD

## 2025-04-17 NOTE — TELEPHONE ENCOUNTER
Prior Authorization Retail Medication Request    Medication/Dose: pimecrolimus (ELIDEL) 1 % external cream  Diagnosis and ICD code (if different than what is on RX):    New/renewal/insurance change PA/secondary ins. PA:  Previously Tried and Failed:  see chart  Rationale:  see chart    Insurance   Primary: [employer]  Insurance ID:  [id number]     Clinic Information  Preferred routing pool for dept communication: [facility]

## 2025-04-22 NOTE — TELEPHONE ENCOUNTER
Retail Pharmacy Prior Authorization Team   Phone: [phone]    PA Initiation    Medication: PIMECROLIMUS 1 % EX CREA  Insurance Company: [employer] Part D - Phone [phone] Fax [phone]  Pharmacy Filling the Rx: [id number] [place], [place] [address] [facility] [place]  Filling Pharmacy Phone: [phone]  Filling Pharmacy Fax:    Start Date: 4/22/2025    [id number]

## 2025-04-24 NOTE — TELEPHONE ENCOUNTER
PRIOR AUTHORIZATION DENIED    Medication: PIMECROLIMUS 1 % EX CREA  Insurance Company: [employer] Part [employer] - Phone [phone] Fax [phone]  Denial Date: 4/23/2025  Denial Reason(s):           Appeal Information:         Patient Notified: No

## 2025-04-26 ENCOUNTER — MYC REFILL (OUTPATIENT)
Dept: DERMATOLOGY | Facility: CLINIC | Age: 75
End: 2025-04-26
Payer: COMMERCIAL

## 2025-04-26 DIAGNOSIS — G62.9 NEUROPATHY: ICD-10-CM

## 2025-04-26 NOTE — TELEPHONE ENCOUNTER
Encounter Date: Apr 17, 2025     Dermatology Problem List:  1. 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  2. Sjogren syndrome (+RUSLAN 1:320, speckled, +Ro60 234, +SSA 4.6, weak +APLA 18.4)  - RF-, C3/C4 normal, dsDNA-, Ig normal, HCV-, SPEP normal  - prior: hydroxychloroquine (nausea, hair loss, dizziness)  - Salivary glands scan wnl  3. Burning dysesthesia on face  - current tx: gabapentin 100 mg daily  4. Anhidrosis   -  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 SHe thinks that is since childhood.  - Other autonomic reflex screen was minimally abnormal. Isolated finding of focal reduced sweat response to the proximal leg with normal cardiovagal and adrenergic reflexes, which was of uncertain significance.   - Brain MRI wnl.  5. Rosacea   - current tx: pimecrolimus 1% cream PRN, clindamycin 1% lotion  - prior tx: doxycycline, Metronidazole 0.75% cream  6. Osteopenia  7. S/p Hysterectomy with bilateral salpingo-oophorectomy for endometriosis, age 36.  8. 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 Hx of vitamin D deficiency   10. Diminished thyroid uptake on left compared to rt on scan  11. SK  - cryo cheeks and hands 5/8/24, 11/21/24.  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 Linear morphea (en coup de sabre), forehead, chronic and stable. Discussed restarting pimecrolimus when flares occur or a course of prednisone, which patient will reach out via MyChart if a more severe flare occurs to restart. Discussed pros/cons of using multivitamins, Vitamin D, and Vitamin B for therapeutic treatment.   - Monitor for worsening.  - hold off on restarting pimecrolimus 1% cream PRN until flaring occurs     2. Burning dysesthesia on  face, not addressed today.  - Continue gabapentin 100 mg daily     3. Rosacea. Also risks/benefits of starting clindamycin vs doxycycline.  - Discussed avoiding food triggers such as spicy foods  - start Clindamycin 1% lotion BID     Follow-up in 6 months, in person, or sooner for new or changing lesions.

## 2025-04-27 RX ORDER — GABAPENTIN 100 MG/1
100 CAPSULE ORAL AT BEDTIME
Qty: 30 CAPSULE | Refills: 11 | Status: SHIPPED | OUTPATIENT
Start: 2025-04-27

## 2025-05-02 NOTE — TELEPHONE ENCOUNTER
Medication Appeal Initiation    Medication: PIMECROLIMUS 1 % EX CREA  Appeal Start Date:  5/2/2025  Insurance Phone: [phone]  Comments:   Appeal faxed

## 2025-05-08 NOTE — TELEPHONE ENCOUNTER
[facility] called to request additional information needed for further review. I've notified the rep that I have completed the form and faxed it back on 5/6/2025 but I will re-fax it. Faxed form to [phone].

## 2025-05-13 NOTE — TELEPHONE ENCOUNTER
MEDICATION APPEAL APPROVED    Medication: PIMECROLIMUS 1 % EX CREA  Authorization Effective Date: 4/22/2025  Authorization Expiration Date: 12/31/2025  Approved Dose/Quantity:   Reference #:     Appeal Insurance Company:   Expected CoPay: $       CoPay Card Available:    Financial Assistance Needed:   Filling Pharmacy: [facility] [id number] [place] [address]  Patient Notified: **Instructed pharmacy to notify patient when script is ready to /ship.**

## 2025-06-30 ENCOUNTER — HOSPITAL ENCOUNTER (OUTPATIENT)
Dept: MAMMOGRAPHY | Facility: CLINIC | Age: 75
Discharge: HOME | End: 2025-06-30
Admitting: FAMILY MEDICINE
Payer: COMMERCIAL

## 2025-06-30 DIAGNOSIS — Z12.31 VISIT FOR SCREENING MAMMOGRAM: ICD-10-CM

## 2025-06-30 PROCEDURE — 77063 BREAST TOMOSYNTHESIS BI: CPT

## 2025-07-21 NOTE — LETTER
6/16/2023       RE: Pily Mejia  [address] [facility] [address]     Dear Colleague,    Thank you for referring your patient, Pily Mejia, to the [facility]ERMAT[facility] [place] [facility]. Please see a copy of my visit note below.    [facility] Dermatology Note    Encounter Date: Jun 16, 2023    Dermatology Problem List:  1. 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 Sjogren syndrome (+RUSLAN 1:320, speckled, +Ro60 234, +SSA 4.6, weak +APLA 18.4)  - RF-, C3/C4 normal, dsDNA-, Ig normal, HCV-, SPEP normal  - prior: hydroxychloroquine (nausea, hair loss, dizziness)  - Salivary glands scan wnl  3. Burning dysesthesia on face  - gabapentin 100 mg daily  4. Anhidrosis   -  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 SHe thinks that is since childhood.  - Other autonomic reflex screen was minimally abnormal. Isolated finding of focal reduced sweat response to the proximal leg with normal cardiovagal and adrenergic reflexes, which was of uncertain significance.   - Brain MRI wnl.  5. Rosacea   - pimecrolimus PRN  - prior tx: doxycycline  6. Osteopenia  7. S/p Hysterectomy with bilateral salpingo-oophorectomy for endometriosis, age 36.  8. 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 Hx of vitamin D deficiency   10. Diminished thyroid uptake on lerft compared to rt on scan       ______________________________________    Impression/Plan:  1. Morphea: overall cutaneous symptoms are stable but had increased when tried tapering methotrexate below 10 mg weekly; now back to 12.5 mg weekly. We discussed cautious taper but will maintain for now. Having quite a bit of fatigue and mental fogginess which is likely multifactorial. Will taper gabapentin and stop to see if this is helpful. Potentially reduction in methotrexate dose will help, but defer for now  Addended by: MELISSA HERNANDEZ on: 7/21/2025 03:20 PM     Modules accepted: Level of Service     given recent activity.  - methotrexate 12.5 mg weekly  - folic acid  - CBC, CMP q3 months  - taper gabapentin  - pimecrolimus cream  - rheumatology referral for concomitant Sjogren syndrome    Follow-up in 3-4 months.       Staff Involved:  Staff Only    Eris Araujo MD   of Dermatology  Department of Dermatology  [facility] [employer] [facility]      CC:   Chief Complaint   Patient presents with    Derm Problem     Pily is here to follow-up on her linear morphea. She states that it seems to have gotten worse immediately, and then has remained the same.       History of Present Illness:  Ms. Pily Mejia is a 73 year old female who presents as a return patient.    Morphea - on methotrexate 12.5 mg weekly  - had more redness when tried to decrease from 10 mg weekly  - seems to fluctuate a bit day-to-day  - using pimecrolimus  - sunscreen over the top of the pimecrolimus causes itching and bumps    Fatigue has been a significant concern    On gabapentin 100 mg at bedtime - helpful to treat facial burning, but this was intermittent  - query exacerbation of fatigue symptoms/mental fogginess    Labs:  5/31/23 CBC, CMP unremarkable, A1c 5.7%    Physical exam:  Vitals: There were no vitals taken for this visit.  GEN: This is a well developed, well-nourished female in no acute distress, in a pleasant mood.    SKIN: Russell phototype II  - Focused examination of the face was performed.  - faint erythema on the left brow  - No other lesions of concern on areas examined.     Past Medical History:   No past medical history on file.  Past Surgical History:   Procedure Laterality Date    HYSTERECTOMY  1985    OOPHORECTOMY  1985       Social History:   reports that she has never smoked. She has never used smokeless tobacco.    Family History:  Family History   Problem Relation Age of Onset    Breast Cancer No family hx of        Medications:  Current Outpatient Medications   Medication Sig  Dispense Refill    acetaminophen (TYLENOL) 500 MG tablet Take 1,000 mg by mouth      aspirin (ASA) 81 MG EC tablet Take 81 mg by mouth daily      folic acid (FOLVITE) 1 MG tablet Take 1 tablet (1 mg) by mouth daily 90 tablet 3    gabapentin (NEURONTIN) 100 MG capsule Take 100mg at night and increase by 100mg at night until 300mg 90 capsule 3    methotrexate sodium 2.5 MG TABS Take 4 tablets (10 mg) by mouth once a week 52 tablet 3    pimecrolimus (ELIDEL) 1 % external cream Apply topically 2 times daily 60 g 4    Azelastine HCl 137 MCG/SPRAY SOLN INSTILL 1 TO 2 SPRAYS INTO EACH NOSTRIL TWICE DAILY (Patient not taking: Reported on 9/22/2022)  1    Calcium Carbonate Antacid 1177 MG CHEW As needed (Patient not taking: Reported on 9/22/2022)      diclofenac (VOLTAREN) 1 % topical gel  (Patient not taking: Reported on 9/22/2022)      fluticasone (FLONASE) 50 MCG/ACT nasal spray Spray 1 spray in nostril (Patient not taking: Reported on 9/22/2022)      lidocaine (LMX4) 4 % external cream Apply topically once as needed for mild pain (Patient not taking: Reported on 11/18/2022) 45 g 1    metroNIDAZOLE (METROCREAM) 0.75 % external cream Apply topically daily (Patient not taking: Reported on 2/23/2023) 45 g 11    mometasone (ELOCON) 0.1 % external cream PLEASE SEE ATTACHED FOR DETAILED DIRECTIONS (Patient not taking: Reported on 9/22/2022)  11    probiotic CAPS Take 1 capsule by mouth (Patient not taking: Reported on 11/18/2022)       Allergies   Allergen Reactions    Codeine     Fish Oil GI Disturbance    Hydroxychloroquine Other (See Comments) and GI Disturbance    [place] GI Disturbance    No Clinical Screening - See Comments Other (See Comments)     Triminycine per patient    Penicillins     Soybean Oil GI Disturbance

## 2025-09-04 ENCOUNTER — OFFICE VISIT (OUTPATIENT)
Dept: DERMATOLOGY | Facility: CLINIC | Age: 75
End: 2025-09-04
Attending: DERMATOLOGY
Payer: COMMERCIAL

## 2025-09-04 DIAGNOSIS — L71.9 ROSACEA: ICD-10-CM
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
## 2025-09-04 DIAGNOSIS — L94.0 MORPHEA: ICD-10-CM

## 2025-09-04 RX ORDER — BETAMETHASONE DIPROPIONATE 0.5 MG/G
OINTMENT, AUGMENTED TOPICAL 2 TIMES DAILY
Qty: 50 G | Refills: 3 | Status: SHIPPED | OUTPATIENT
Start: 2025-09-04

## 2025-09-04 RX ORDER — AZELAIC ACID 0.15 G/G
GEL TOPICAL AT BEDTIME
Qty: 50 G | Refills: 11 | Status: SHIPPED | OUTPATIENT
Start: 2025-09-04

## 2025-09-04 ASSESSMENT — PAIN SCALES - GENERAL: PAINLEVEL_OUTOF10: NO PAIN (0)
